# Patient Record
Sex: FEMALE | Race: WHITE | NOT HISPANIC OR LATINO | URBAN - METROPOLITAN AREA
[De-identification: names, ages, dates, MRNs, and addresses within clinical notes are randomized per-mention and may not be internally consistent; named-entity substitution may affect disease eponyms.]

---

## 2017-04-10 ENCOUNTER — EMERGENCY (EMERGENCY)
Facility: HOSPITAL | Age: 52
LOS: 0 days | Discharge: HOME | End: 2017-04-10
Admitting: INTERNAL MEDICINE

## 2017-06-27 DIAGNOSIS — I10 ESSENTIAL (PRIMARY) HYPERTENSION: ICD-10-CM

## 2017-06-27 DIAGNOSIS — K21.9 GASTRO-ESOPHAGEAL REFLUX DISEASE WITHOUT ESOPHAGITIS: ICD-10-CM

## 2017-06-27 DIAGNOSIS — R10.9 UNSPECIFIED ABDOMINAL PAIN: ICD-10-CM

## 2017-06-27 DIAGNOSIS — Z79.899 OTHER LONG TERM (CURRENT) DRUG THERAPY: ICD-10-CM

## 2017-06-27 DIAGNOSIS — N20.0 CALCULUS OF KIDNEY: ICD-10-CM

## 2017-06-27 DIAGNOSIS — Z79.82 LONG TERM (CURRENT) USE OF ASPIRIN: ICD-10-CM

## 2018-06-12 ENCOUNTER — OUTPATIENT (OUTPATIENT)
Dept: OUTPATIENT SERVICES | Facility: HOSPITAL | Age: 53
LOS: 1 days | Discharge: HOME | End: 2018-06-12

## 2018-06-14 DIAGNOSIS — K02.53 DENTAL CARIES ON PIT AND FISSURE SURFACE PENETRATING INTO PULP: ICD-10-CM

## 2018-06-29 ENCOUNTER — OUTPATIENT (OUTPATIENT)
Dept: OUTPATIENT SERVICES | Facility: HOSPITAL | Age: 53
LOS: 1 days | Discharge: HOME | End: 2018-06-29

## 2018-08-09 ENCOUNTER — OUTPATIENT (OUTPATIENT)
Dept: OUTPATIENT SERVICES | Facility: HOSPITAL | Age: 53
LOS: 1 days | Discharge: HOME | End: 2018-08-09

## 2019-09-04 ENCOUNTER — APPOINTMENT (OUTPATIENT)
Dept: OTOLARYNGOLOGY | Facility: CLINIC | Age: 54
End: 2019-09-04

## 2020-12-29 ENCOUNTER — TRANSCRIPTION ENCOUNTER (OUTPATIENT)
Age: 55
End: 2020-12-29

## 2021-12-16 ENCOUNTER — APPOINTMENT (OUTPATIENT)
Dept: OTOLARYNGOLOGY | Facility: CLINIC | Age: 56
End: 2021-12-16
Payer: MEDICARE

## 2021-12-16 DIAGNOSIS — R49.0 DYSPHONIA: ICD-10-CM

## 2021-12-16 DIAGNOSIS — K21.9 GASTRO-ESOPHAGEAL REFLUX DISEASE W/OUT ESOPHAGITIS: ICD-10-CM

## 2021-12-16 PROBLEM — Z00.00 ENCOUNTER FOR PREVENTIVE HEALTH EXAMINATION: Status: ACTIVE | Noted: 2021-12-16

## 2021-12-16 PROCEDURE — 99203 OFFICE O/P NEW LOW 30 MIN: CPT | Mod: 25

## 2021-12-16 PROCEDURE — 31575 DIAGNOSTIC LARYNGOSCOPY: CPT

## 2021-12-16 NOTE — HISTORY OF PRESENT ILLNESS
[de-identified] : Patient presents today with c/o hoarseness. Patient admits has been present for years. Gets worse at times. Patient admits some dysphagia but contributes it to GERD. Some SOB but she suffers from asthma. Patient taking Omeprazole and Famotidine. No hx of smoking. \par +coffee +tea +late eating

## 2021-12-16 NOTE — PROCEDURE
[Flexible Endoscope] : examined with the flexible endoscope [True Vocal Cords Erythematous] : bilateral true vocal cord edema [Glottis Arytenoid Cartilages Erythema] : bilateral arytenoid ~M erythema [Arytenoid Erythema ___ /4] : arytenoid erythema [unfilled]U/4 [Normal] : posterior cricoid area had healthy pink mucosa in the interarytenoid area and the esophageal inlet

## 2022-10-04 ENCOUNTER — APPOINTMENT (OUTPATIENT)
Dept: OTOLARYNGOLOGY | Facility: CLINIC | Age: 57
End: 2022-10-04

## 2023-09-27 ENCOUNTER — APPOINTMENT (OUTPATIENT)
Dept: SURGERY | Facility: CLINIC | Age: 58
End: 2023-09-27
Payer: MEDICARE

## 2023-09-27 VITALS
TEMPERATURE: 97.2 F | BODY MASS INDEX: 43.36 KG/M2 | HEART RATE: 121 BPM | DIASTOLIC BLOOD PRESSURE: 70 MMHG | OXYGEN SATURATION: 98 % | SYSTOLIC BLOOD PRESSURE: 124 MMHG | HEIGHT: 64 IN | WEIGHT: 254 LBS

## 2023-09-27 DIAGNOSIS — I10 ESSENTIAL (PRIMARY) HYPERTENSION: ICD-10-CM

## 2023-09-27 PROCEDURE — 99204 OFFICE O/P NEW MOD 45 MIN: CPT

## 2023-10-04 ENCOUNTER — OUTPATIENT (OUTPATIENT)
Dept: OUTPATIENT SERVICES | Facility: HOSPITAL | Age: 58
LOS: 1 days | End: 2023-10-04
Payer: MEDICARE

## 2023-10-04 ENCOUNTER — APPOINTMENT (OUTPATIENT)
Dept: NEUROPSYCHOLOGY | Facility: CLINIC | Age: 58
End: 2023-10-04

## 2023-10-04 DIAGNOSIS — F50.9 EATING DISORDER, UNSPECIFIED: ICD-10-CM

## 2023-10-04 PROCEDURE — 96146 PSYCL/NRPSYC TST AUTO RESULT: CPT | Mod: 95

## 2023-10-05 DIAGNOSIS — F50.9 EATING DISORDER, UNSPECIFIED: ICD-10-CM

## 2023-10-24 ENCOUNTER — RESULT REVIEW (OUTPATIENT)
Age: 58
End: 2023-10-24

## 2023-10-24 ENCOUNTER — OUTPATIENT (OUTPATIENT)
Dept: OUTPATIENT SERVICES | Facility: HOSPITAL | Age: 58
LOS: 1 days | End: 2023-10-24
Payer: MEDICARE

## 2023-10-24 DIAGNOSIS — Z00.8 ENCOUNTER FOR OTHER GENERAL EXAMINATION: ICD-10-CM

## 2023-10-24 DIAGNOSIS — K44.9 DIAPHRAGMATIC HERNIA WITHOUT OBSTRUCTION OR GANGRENE: ICD-10-CM

## 2023-10-24 PROCEDURE — 74240 X-RAY XM UPR GI TRC 1CNTRST: CPT | Mod: 26

## 2023-10-24 PROCEDURE — 74240 X-RAY XM UPR GI TRC 1CNTRST: CPT

## 2023-10-25 ENCOUNTER — NON-APPOINTMENT (OUTPATIENT)
Age: 58
End: 2023-10-25

## 2023-10-25 DIAGNOSIS — K44.9 DIAPHRAGMATIC HERNIA WITHOUT OBSTRUCTION OR GANGRENE: ICD-10-CM

## 2023-10-26 ENCOUNTER — APPOINTMENT (OUTPATIENT)
Dept: SURGERY | Facility: CLINIC | Age: 58
End: 2023-10-26
Payer: MEDICARE

## 2023-10-26 VITALS — HEIGHT: 64 IN | WEIGHT: 255 LBS | BODY MASS INDEX: 43.54 KG/M2

## 2023-10-26 PROCEDURE — 97802 MEDICAL NUTRITION INDIV IN: CPT

## 2023-11-09 ENCOUNTER — APPOINTMENT (OUTPATIENT)
Dept: SURGERY | Facility: CLINIC | Age: 58
End: 2023-11-09
Payer: MEDICARE

## 2023-11-09 VITALS — HEIGHT: 64 IN | BODY MASS INDEX: 43.36 KG/M2 | WEIGHT: 254 LBS

## 2023-11-09 PROCEDURE — 97803 MED NUTRITION INDIV SUBSEQ: CPT

## 2023-11-15 ENCOUNTER — APPOINTMENT (OUTPATIENT)
Dept: NEUROPSYCHOLOGY | Facility: HOSPITAL | Age: 58
End: 2023-11-15

## 2023-11-15 ENCOUNTER — OUTPATIENT (OUTPATIENT)
Dept: OUTPATIENT SERVICES | Facility: HOSPITAL | Age: 58
LOS: 1 days | End: 2023-11-15
Payer: MEDICARE

## 2023-11-15 DIAGNOSIS — F50.9 EATING DISORDER, UNSPECIFIED: ICD-10-CM

## 2023-11-15 PROCEDURE — 96138 PSYCL/NRPSYC TECH 1ST: CPT | Mod: 95

## 2023-11-15 PROCEDURE — 96139 PSYCL/NRPSYC TST TECH EA: CPT | Mod: 95

## 2023-11-15 PROCEDURE — 96130 PSYCL TST EVAL PHYS/QHP 1ST: CPT | Mod: 95

## 2023-11-15 PROCEDURE — 90791 PSYCH DIAGNOSTIC EVALUATION: CPT | Mod: 95

## 2023-11-16 DIAGNOSIS — F50.9 EATING DISORDER, UNSPECIFIED: ICD-10-CM

## 2023-11-27 ENCOUNTER — APPOINTMENT (OUTPATIENT)
Dept: SURGERY | Facility: CLINIC | Age: 58
End: 2023-11-27
Payer: MEDICARE

## 2023-11-27 PROCEDURE — 97803 MED NUTRITION INDIV SUBSEQ: CPT

## 2023-11-29 VITALS — HEIGHT: 64 IN | WEIGHT: 254 LBS | BODY MASS INDEX: 43.36 KG/M2

## 2023-12-11 ENCOUNTER — APPOINTMENT (OUTPATIENT)
Dept: SURGERY | Facility: CLINIC | Age: 58
End: 2023-12-11
Payer: MEDICARE

## 2023-12-11 PROCEDURE — 97803 MED NUTRITION INDIV SUBSEQ: CPT | Mod: 95

## 2023-12-13 VITALS — HEIGHT: 64 IN | WEIGHT: 251 LBS | BODY MASS INDEX: 42.85 KG/M2

## 2024-02-06 DIAGNOSIS — I10 ESSENTIAL (PRIMARY) HYPERTENSION: ICD-10-CM

## 2024-02-06 DIAGNOSIS — K22.70 BARRETT'S ESOPHAGUS W/OUT DYSPLASIA: ICD-10-CM

## 2024-02-06 DIAGNOSIS — F41.9 ANXIETY DISORDER, UNSPECIFIED: ICD-10-CM

## 2024-02-06 DIAGNOSIS — F32.A ANXIETY DISORDER, UNSPECIFIED: ICD-10-CM

## 2024-02-06 DIAGNOSIS — G43.919 MIGRAINE, UNSPECIFIED, INTRACTABLE, W/OUT STATUS MIGRAINOSUS: ICD-10-CM

## 2024-02-06 DIAGNOSIS — M15.9 POLYOSTEOARTHRITIS, UNSPECIFIED: ICD-10-CM

## 2024-02-06 RX ORDER — FLUTICASONE FUROATE AND VILANTEROL TRIFENATATE 200; 25 UG/1; UG/1
200-25 POWDER RESPIRATORY (INHALATION)
Refills: 0 | Status: ACTIVE | COMMUNITY

## 2024-02-06 RX ORDER — FLUTICASONE PROPIONATE 50 MCG
SPRAY, SUSPENSION NASAL
Refills: 0 | Status: ACTIVE | COMMUNITY

## 2024-02-06 RX ORDER — MULTIVITAMIN
TABLET ORAL
Refills: 0 | Status: ACTIVE | COMMUNITY

## 2024-04-04 ENCOUNTER — NON-APPOINTMENT (OUTPATIENT)
Age: 59
End: 2024-04-04

## 2024-04-09 RX ORDER — DULOXETINE HYDROCHLORIDE 60 MG/1
60 CAPSULE, DELAYED RELEASE ORAL TWICE DAILY
Refills: 0 | Status: ACTIVE | COMMUNITY

## 2024-04-09 RX ORDER — MONTELUKAST SODIUM 10 MG/1
10 TABLET, FILM COATED ORAL DAILY
Refills: 0 | Status: ACTIVE | COMMUNITY

## 2024-04-09 RX ORDER — PANTOPRAZOLE 20 MG/1
20 TABLET, DELAYED RELEASE ORAL DAILY
Refills: 0 | Status: ACTIVE | COMMUNITY

## 2024-04-09 RX ORDER — AMITRIPTYLINE HYDROCHLORIDE 10 MG/1
10 TABLET, FILM COATED ORAL DAILY
Refills: 0 | Status: ACTIVE | COMMUNITY

## 2024-04-09 RX ORDER — LOSARTAN POTASSIUM 50 MG/1
50 TABLET, FILM COATED ORAL DAILY
Refills: 0 | Status: ACTIVE | COMMUNITY

## 2024-04-09 RX ORDER — SPIRONOLACTONE 50 MG/1
50 TABLET ORAL DAILY
Refills: 0 | Status: ACTIVE | COMMUNITY

## 2024-04-09 RX ORDER — LORATADINE 10 MG/1
TABLET ORAL DAILY
Refills: 0 | Status: ACTIVE | COMMUNITY

## 2024-04-09 RX ORDER — OXYCODONE HYDROCHLORIDE 5 MG/1
CAPSULE ORAL 3 TIMES DAILY
Refills: 0 | Status: ACTIVE | COMMUNITY

## 2024-04-09 RX ORDER — BUPROPION HYDROCHLORIDE 75 MG/1
TABLET, FILM COATED ORAL DAILY
Refills: 0 | Status: ACTIVE | COMMUNITY

## 2024-04-09 RX ORDER — FAMOTIDINE 10 MG/1
TABLET, FILM COATED ORAL DAILY
Refills: 0 | Status: ACTIVE | COMMUNITY

## 2024-04-10 ENCOUNTER — APPOINTMENT (OUTPATIENT)
Dept: SURGERY | Facility: CLINIC | Age: 59
End: 2024-04-10
Payer: MEDICARE

## 2024-04-10 VITALS
SYSTOLIC BLOOD PRESSURE: 126 MMHG | BODY MASS INDEX: 43.87 KG/M2 | HEIGHT: 64 IN | DIASTOLIC BLOOD PRESSURE: 80 MMHG | WEIGHT: 257 LBS

## 2024-04-10 PROCEDURE — G2211 COMPLEX E/M VISIT ADD ON: CPT

## 2024-04-10 PROCEDURE — 99215 OFFICE O/P EST HI 40 MIN: CPT

## 2024-04-13 NOTE — ASSESSMENT
[FreeTextEntry1] : 57 yo female with class 3 obesity, barretts, and a large hiatal hernia planning for a HHR and gastric bypass

## 2024-04-13 NOTE — HISTORY OF PRESENT ILLNESS
[de-identified] : 57 yo female with class 3 obesity and a large hiatal hernia and barretts esophagus. She is planning to undergo a hiatal hernia repair and gastric bypass. She is here for her pre-op visit after completing all her clearances.

## 2024-04-13 NOTE — PHYSICAL EXAM
[Normal] : affect appropriate [de-identified] : nonlabored breathing  [de-identified] : s1,s2 [de-identified] : soft, nontender

## 2024-04-13 NOTE — PLAN
[FreeTextEntry1] : At this preoperative visit, we discussed the risks and benefits of weight loss surgery. We specifically discussed the risks of anesthesia including cardiac and pulmonary complications, DVT/PE, pneumonia, leaks, infection, death, bleeding, ulcers, and need for additional operations have been reviewed. We discussed the importance of a consistent diet and exercise regimen in regards to weight loss and maintenance as well. The importance of lifelong mineral and vitamin supplementation was also reviewed with the patient. The patient was given ample opportunity to ask questions and all questions were answered.  They also saw EMRE Perez to discuss the pre- and post-operative instructions and diet in extensive detail. Please see their notes for further detail.

## 2024-04-15 ENCOUNTER — OUTPATIENT (OUTPATIENT)
Dept: OUTPATIENT SERVICES | Facility: HOSPITAL | Age: 59
LOS: 1 days | End: 2024-04-15
Payer: MEDICARE

## 2024-04-15 VITALS
HEART RATE: 96 BPM | HEIGHT: 64 IN | DIASTOLIC BLOOD PRESSURE: 63 MMHG | WEIGHT: 259.04 LBS | RESPIRATION RATE: 20 BRPM | SYSTOLIC BLOOD PRESSURE: 124 MMHG | OXYGEN SATURATION: 100 % | TEMPERATURE: 97 F

## 2024-04-15 DIAGNOSIS — Z01.818 ENCOUNTER FOR OTHER PREPROCEDURAL EXAMINATION: ICD-10-CM

## 2024-04-15 DIAGNOSIS — Z98.890 OTHER SPECIFIED POSTPROCEDURAL STATES: Chronic | ICD-10-CM

## 2024-04-15 DIAGNOSIS — E66.01 MORBID (SEVERE) OBESITY DUE TO EXCESS CALORIES: ICD-10-CM

## 2024-04-15 DIAGNOSIS — Z90.49 ACQUIRED ABSENCE OF OTHER SPECIFIED PARTS OF DIGESTIVE TRACT: Chronic | ICD-10-CM

## 2024-04-15 DIAGNOSIS — N20.0 CALCULUS OF KIDNEY: Chronic | ICD-10-CM

## 2024-04-15 LAB
A1C WITH ESTIMATED AVERAGE GLUCOSE RESULT: 5.9 % — HIGH (ref 4–5.6)
ALBUMIN SERPL ELPH-MCNC: 4.4 G/DL — SIGNIFICANT CHANGE UP (ref 3.5–5.2)
ALP SERPL-CCNC: 100 U/L — SIGNIFICANT CHANGE UP (ref 30–115)
ALT FLD-CCNC: 16 U/L — SIGNIFICANT CHANGE UP (ref 0–41)
ANION GAP SERPL CALC-SCNC: 14 MMOL/L — SIGNIFICANT CHANGE UP (ref 7–14)
APTT BLD: 33.8 SEC — SIGNIFICANT CHANGE UP (ref 27–39.2)
AST SERPL-CCNC: 13 U/L — SIGNIFICANT CHANGE UP (ref 0–41)
BASOPHILS # BLD AUTO: 0.03 K/UL — SIGNIFICANT CHANGE UP (ref 0–0.2)
BASOPHILS NFR BLD AUTO: 0.3 % — SIGNIFICANT CHANGE UP (ref 0–1)
BILIRUB SERPL-MCNC: 0.2 MG/DL — SIGNIFICANT CHANGE UP (ref 0.2–1.2)
BLD GP AB SCN SERPL QL: SIGNIFICANT CHANGE UP
BUN SERPL-MCNC: 14 MG/DL — SIGNIFICANT CHANGE UP (ref 10–20)
CALCIUM SERPL-MCNC: 9.6 MG/DL — SIGNIFICANT CHANGE UP (ref 8.4–10.5)
CHLORIDE SERPL-SCNC: 103 MMOL/L — SIGNIFICANT CHANGE UP (ref 98–110)
CO2 SERPL-SCNC: 26 MMOL/L — SIGNIFICANT CHANGE UP (ref 17–32)
CREAT SERPL-MCNC: 0.7 MG/DL — SIGNIFICANT CHANGE UP (ref 0.7–1.5)
EGFR: 100 ML/MIN/1.73M2 — SIGNIFICANT CHANGE UP
EOSINOPHIL # BLD AUTO: 0.25 K/UL — SIGNIFICANT CHANGE UP (ref 0–0.7)
EOSINOPHIL NFR BLD AUTO: 2.2 % — SIGNIFICANT CHANGE UP (ref 0–8)
ESTIMATED AVERAGE GLUCOSE: 123 MG/DL — HIGH (ref 68–114)
GLUCOSE SERPL-MCNC: 90 MG/DL — SIGNIFICANT CHANGE UP (ref 70–99)
HCT VFR BLD CALC: 43.2 % — SIGNIFICANT CHANGE UP (ref 37–47)
HGB BLD-MCNC: 13.3 G/DL — SIGNIFICANT CHANGE UP (ref 12–16)
IMM GRANULOCYTES NFR BLD AUTO: 0.5 % — HIGH (ref 0.1–0.3)
INR BLD: 1 RATIO — SIGNIFICANT CHANGE UP (ref 0.65–1.3)
LYMPHOCYTES # BLD AUTO: 1.93 K/UL — SIGNIFICANT CHANGE UP (ref 1.2–3.4)
LYMPHOCYTES # BLD AUTO: 16.9 % — LOW (ref 20.5–51.1)
MCHC RBC-ENTMCNC: 26.9 PG — LOW (ref 27–31)
MCHC RBC-ENTMCNC: 30.8 G/DL — LOW (ref 32–37)
MCV RBC AUTO: 87.4 FL — SIGNIFICANT CHANGE UP (ref 81–99)
MONOCYTES # BLD AUTO: 0.51 K/UL — SIGNIFICANT CHANGE UP (ref 0.1–0.6)
MONOCYTES NFR BLD AUTO: 4.5 % — SIGNIFICANT CHANGE UP (ref 1.7–9.3)
NEUTROPHILS # BLD AUTO: 8.62 K/UL — HIGH (ref 1.4–6.5)
NEUTROPHILS NFR BLD AUTO: 75.6 % — HIGH (ref 42.2–75.2)
NRBC # BLD: 0 /100 WBCS — SIGNIFICANT CHANGE UP (ref 0–0)
PLATELET # BLD AUTO: 319 K/UL — SIGNIFICANT CHANGE UP (ref 130–400)
PMV BLD: 10.5 FL — HIGH (ref 7.4–10.4)
POTASSIUM SERPL-MCNC: 4.8 MMOL/L — SIGNIFICANT CHANGE UP (ref 3.5–5)
POTASSIUM SERPL-SCNC: 4.8 MMOL/L — SIGNIFICANT CHANGE UP (ref 3.5–5)
PROT SERPL-MCNC: 7.5 G/DL — SIGNIFICANT CHANGE UP (ref 6–8)
PROTHROM AB SERPL-ACNC: 11.4 SEC — SIGNIFICANT CHANGE UP (ref 9.95–12.87)
RBC # BLD: 4.94 M/UL — SIGNIFICANT CHANGE UP (ref 4.2–5.4)
RBC # FLD: 14.8 % — HIGH (ref 11.5–14.5)
SODIUM SERPL-SCNC: 143 MMOL/L — SIGNIFICANT CHANGE UP (ref 135–146)
WBC # BLD: 11.4 K/UL — HIGH (ref 4.8–10.8)
WBC # FLD AUTO: 11.4 K/UL — HIGH (ref 4.8–10.8)

## 2024-04-15 PROCEDURE — 99214 OFFICE O/P EST MOD 30 MIN: CPT | Mod: 25

## 2024-04-15 PROCEDURE — 86901 BLOOD TYPING SEROLOGIC RH(D): CPT

## 2024-04-15 PROCEDURE — 80053 COMPREHEN METABOLIC PANEL: CPT

## 2024-04-15 PROCEDURE — 86850 RBC ANTIBODY SCREEN: CPT

## 2024-04-15 PROCEDURE — 86900 BLOOD TYPING SEROLOGIC ABO: CPT

## 2024-04-15 PROCEDURE — 93010 ELECTROCARDIOGRAM REPORT: CPT

## 2024-04-15 PROCEDURE — 93005 ELECTROCARDIOGRAM TRACING: CPT

## 2024-04-15 PROCEDURE — 83036 HEMOGLOBIN GLYCOSYLATED A1C: CPT

## 2024-04-15 PROCEDURE — 85025 COMPLETE CBC W/AUTO DIFF WBC: CPT

## 2024-04-15 PROCEDURE — 85730 THROMBOPLASTIN TIME PARTIAL: CPT

## 2024-04-15 PROCEDURE — 36415 COLL VENOUS BLD VENIPUNCTURE: CPT

## 2024-04-15 PROCEDURE — 85610 PROTHROMBIN TIME: CPT

## 2024-04-15 NOTE — H&P PST ADULT - NSICDXPASTMEDICALHX_GEN_ALL_CORE_FT
PAST MEDICAL HISTORY:  Asthma     Back pain     Depression     GERD (gastroesophageal reflux disease)     HTN (hypertension)     Migraines     Neck pain     RAFA (obstructive sleep apnea)     Pseudogout     Renal stones

## 2024-04-15 NOTE — H&P PST ADULT - HISTORY OF PRESENT ILLNESS
59 y/o female  57 y/o female, h/o severe obesity; elected for above procedure. Denied chest pain, palp, cough, recent URI/UTI, +WEBB > 2 blks / > 1FOS.     Anesthesia Alert  NO--Difficult Airway  NO--History of neck surgery or radiation  NO--Limited ROM of neck  NO--History of Malignant hyperthermia  NO--Personal or family history of Pseudocholinesterase deficiency  NO--Prior Anesthesia Complication  NO--Latex Allergy  NO--Loose teeth  NO--History of Rheumatoid Arthritis  YES--RAFA -- Denied CPAP/BiPAP use   NO--Bleeding Risk  NO--Other_____    DASI  34.7 points  The higher the score (maximum 58.2), the higher the functional status.  7.01 METs      RCRI  0 points  Class I Risk  3.9 %      Opioid Risk Assessment Tool (Female)       Family history of substance abuse            Alcohol (1)  NO            Illegal Drugs (2)  NO            Prescription drugs (4)  NO       Personal history of substance abuse            Alcohol (3) NO            Illegal Drugs (4) NO            Prescription drugs (5)  YES       Age between 16-45 (1) NO         History of preadolescent sexual abuse (3)  NO       Psychological disease (ADD, ADHD, OCD, Bipolar Disorder, Schizophrenia, Depression) (2) 2     Scoring Totals:  Low Risk (0-3)  Moderate Risk (4-7)  High Risk (>/=8)

## 2024-04-15 NOTE — H&P PST ADULT - REASON FOR ADMISSION
Robotic Gastric Bypass, Possible Hiatal Hernia Repair, Possible Intra Operative Endoscopy, Possible Open, and all indicated Procedures

## 2024-04-15 NOTE — H&P PST ADULT - NSICDXPASTSURGICALHX_GEN_ALL_CORE_FT
PAST SURGICAL HISTORY:  H/O bladder repair surgery     History of cholecystectomy     Renal stones

## 2024-04-15 NOTE — H&P PST ADULT - NSCAFFEAMTFREQ_GEN_ALL_CORE_SD
PMD: Dr. Stein and Cardio: Dr. Jignesh Arce PMD: Dr. Jim Stein and Cardio: Dr. Jignesh Arce 1-2 cups/cans per day

## 2024-04-15 NOTE — H&P PST ADULT - NSICDXFAMILYHX_GEN_ALL_CORE_FT
FAMILY HISTORY:  Father  Still living? Unknown  FH: HTN (hypertension), Age at diagnosis: Age Unknown    Sibling  Still living? Unknown  Family history of blood clots, Age at diagnosis: Age Unknown

## 2024-04-16 DIAGNOSIS — E66.01 MORBID (SEVERE) OBESITY DUE TO EXCESS CALORIES: ICD-10-CM

## 2024-04-16 DIAGNOSIS — Z01.818 ENCOUNTER FOR OTHER PREPROCEDURAL EXAMINATION: ICD-10-CM

## 2024-04-22 PROBLEM — G47.33 OBSTRUCTIVE SLEEP APNEA (ADULT) (PEDIATRIC): Chronic | Status: ACTIVE | Noted: 2024-04-15

## 2024-04-22 PROBLEM — N20.0 CALCULUS OF KIDNEY: Chronic | Status: ACTIVE | Noted: 2024-04-15

## 2024-04-22 PROBLEM — M54.2 CERVICALGIA: Chronic | Status: ACTIVE | Noted: 2024-04-15

## 2024-04-22 PROBLEM — M54.9 DORSALGIA, UNSPECIFIED: Chronic | Status: ACTIVE | Noted: 2024-04-15

## 2024-04-22 PROBLEM — I10 ESSENTIAL (PRIMARY) HYPERTENSION: Chronic | Status: ACTIVE | Noted: 2024-04-15

## 2024-04-22 PROBLEM — M11.20 OTHER CHONDROCALCINOSIS, UNSPECIFIED SITE: Chronic | Status: ACTIVE | Noted: 2024-04-15

## 2024-04-22 PROBLEM — K21.9 GASTRO-ESOPHAGEAL REFLUX DISEASE WITHOUT ESOPHAGITIS: Chronic | Status: ACTIVE | Noted: 2024-04-15

## 2024-04-22 PROBLEM — F32.A DEPRESSION, UNSPECIFIED: Chronic | Status: ACTIVE | Noted: 2024-04-15

## 2024-04-22 PROBLEM — G43.909 MIGRAINE, UNSPECIFIED, NOT INTRACTABLE, WITHOUT STATUS MIGRAINOSUS: Chronic | Status: ACTIVE | Noted: 2024-04-15

## 2024-04-22 PROBLEM — E66.01 MORBID (SEVERE) OBESITY DUE TO EXCESS CALORIES: Chronic | Status: ACTIVE | Noted: 2024-04-15

## 2024-04-22 PROBLEM — J45.909 UNSPECIFIED ASTHMA, UNCOMPLICATED: Chronic | Status: ACTIVE | Noted: 2024-04-15

## 2024-04-26 NOTE — ASU PATIENT PROFILE, ADULT - NSICDXPASTMEDICALHX_GEN_ALL_CORE_FT
PAST MEDICAL HISTORY:  Asthma     Back pain     Depression     GERD (gastroesophageal reflux disease)     HTN (hypertension)     Migraines     Neck pain     RAFA (obstructive sleep apnea)     Pseudogout     Renal stones     Severe obesity (BMI >= 40)

## 2024-04-26 NOTE — ASU PATIENT PROFILE, ADULT - NSICDXPASTSURGICALHX_GEN_ALL_CORE_FT
PAST SURGICAL HISTORY:  H/O bladder repair surgery     H/O removal of cyst     History of cholecystectomy     Renal stones

## 2024-04-29 ENCOUNTER — APPOINTMENT (OUTPATIENT)
Dept: SURGERY | Facility: HOSPITAL | Age: 59
End: 2024-04-29

## 2024-04-29 ENCOUNTER — INPATIENT (INPATIENT)
Facility: HOSPITAL | Age: 59
LOS: 15 days | Discharge: HOME HEALTH | DRG: 619 | End: 2024-05-15
Attending: STUDENT IN AN ORGANIZED HEALTH CARE EDUCATION/TRAINING PROGRAM | Admitting: STUDENT IN AN ORGANIZED HEALTH CARE EDUCATION/TRAINING PROGRAM
Payer: MEDICARE

## 2024-04-29 VITALS
HEART RATE: 109 BPM | TEMPERATURE: 98 F | WEIGHT: 246.92 LBS | SYSTOLIC BLOOD PRESSURE: 133 MMHG | OXYGEN SATURATION: 94 % | DIASTOLIC BLOOD PRESSURE: 94 MMHG | HEIGHT: 64 IN | RESPIRATION RATE: 18 BRPM

## 2024-04-29 DIAGNOSIS — Z98.890 OTHER SPECIFIED POSTPROCEDURAL STATES: Chronic | ICD-10-CM

## 2024-04-29 DIAGNOSIS — Z90.49 ACQUIRED ABSENCE OF OTHER SPECIFIED PARTS OF DIGESTIVE TRACT: Chronic | ICD-10-CM

## 2024-04-29 DIAGNOSIS — N20.0 CALCULUS OF KIDNEY: Chronic | ICD-10-CM

## 2024-04-29 DIAGNOSIS — E66.01 MORBID (SEVERE) OBESITY DUE TO EXCESS CALORIES: ICD-10-CM

## 2024-04-29 LAB
ANION GAP SERPL CALC-SCNC: 13 MMOL/L — SIGNIFICANT CHANGE UP (ref 7–14)
BASOPHILS # BLD AUTO: 0.01 K/UL — SIGNIFICANT CHANGE UP (ref 0–0.2)
BASOPHILS NFR BLD AUTO: 0.1 % — SIGNIFICANT CHANGE UP (ref 0–1)
BUN SERPL-MCNC: 15 MG/DL — SIGNIFICANT CHANGE UP (ref 10–20)
CALCIUM SERPL-MCNC: 9.3 MG/DL — SIGNIFICANT CHANGE UP (ref 8.4–10.5)
CHLORIDE SERPL-SCNC: 98 MMOL/L — SIGNIFICANT CHANGE UP (ref 98–110)
CO2 SERPL-SCNC: 27 MMOL/L — SIGNIFICANT CHANGE UP (ref 17–32)
CREAT SERPL-MCNC: 0.8 MG/DL — SIGNIFICANT CHANGE UP (ref 0.7–1.5)
EGFR: 85 ML/MIN/1.73M2 — SIGNIFICANT CHANGE UP
EOSINOPHIL # BLD AUTO: 0 K/UL — SIGNIFICANT CHANGE UP (ref 0–0.7)
EOSINOPHIL NFR BLD AUTO: 0 % — SIGNIFICANT CHANGE UP (ref 0–8)
GLUCOSE BLDC GLUCOMTR-MCNC: 114 MG/DL — HIGH (ref 70–99)
GLUCOSE BLDC GLUCOMTR-MCNC: 157 MG/DL — HIGH (ref 70–99)
GLUCOSE BLDC GLUCOMTR-MCNC: 164 MG/DL — HIGH (ref 70–99)
GLUCOSE SERPL-MCNC: 152 MG/DL — HIGH (ref 70–99)
HCT VFR BLD CALC: 41.3 % — SIGNIFICANT CHANGE UP (ref 37–47)
HGB BLD-MCNC: 12.7 G/DL — SIGNIFICANT CHANGE UP (ref 12–16)
IMM GRANULOCYTES NFR BLD AUTO: 0.5 % — HIGH (ref 0.1–0.3)
LYMPHOCYTES # BLD AUTO: 0.94 K/UL — LOW (ref 1.2–3.4)
LYMPHOCYTES # BLD AUTO: 6.4 % — LOW (ref 20.5–51.1)
MAGNESIUM SERPL-MCNC: 1.7 MG/DL — LOW (ref 1.8–2.4)
MCHC RBC-ENTMCNC: 26.7 PG — LOW (ref 27–31)
MCHC RBC-ENTMCNC: 30.8 G/DL — LOW (ref 32–37)
MCV RBC AUTO: 86.8 FL — SIGNIFICANT CHANGE UP (ref 81–99)
MONOCYTES # BLD AUTO: 0.45 K/UL — SIGNIFICANT CHANGE UP (ref 0.1–0.6)
MONOCYTES NFR BLD AUTO: 3.1 % — SIGNIFICANT CHANGE UP (ref 1.7–9.3)
NEUTROPHILS # BLD AUTO: 13.14 K/UL — HIGH (ref 1.4–6.5)
NEUTROPHILS NFR BLD AUTO: 89.9 % — HIGH (ref 42.2–75.2)
NRBC # BLD: 0 /100 WBCS — SIGNIFICANT CHANGE UP (ref 0–0)
PHOSPHATE SERPL-MCNC: 3.4 MG/DL — SIGNIFICANT CHANGE UP (ref 2.1–4.9)
PLATELET # BLD AUTO: 275 K/UL — SIGNIFICANT CHANGE UP (ref 130–400)
PMV BLD: 10.8 FL — HIGH (ref 7.4–10.4)
POTASSIUM SERPL-MCNC: 5.1 MMOL/L — HIGH (ref 3.5–5)
POTASSIUM SERPL-SCNC: 5.1 MMOL/L — HIGH (ref 3.5–5)
RBC # BLD: 4.76 M/UL — SIGNIFICANT CHANGE UP (ref 4.2–5.4)
RBC # FLD: 14.8 % — HIGH (ref 11.5–14.5)
SODIUM SERPL-SCNC: 138 MMOL/L — SIGNIFICANT CHANGE UP (ref 135–146)
WBC # BLD: 14.61 K/UL — HIGH (ref 4.8–10.8)
WBC # FLD AUTO: 14.61 K/UL — HIGH (ref 4.8–10.8)

## 2024-04-29 PROCEDURE — 84300 ASSAY OF URINE SODIUM: CPT

## 2024-04-29 PROCEDURE — 93005 ELECTROCARDIOGRAM TRACING: CPT

## 2024-04-29 PROCEDURE — P9047: CPT

## 2024-04-29 PROCEDURE — 86900 BLOOD TYPING SEROLOGIC ABO: CPT

## 2024-04-29 PROCEDURE — 82550 ASSAY OF CK (CPK): CPT

## 2024-04-29 PROCEDURE — C1889: CPT

## 2024-04-29 PROCEDURE — 85025 COMPLETE CBC W/AUTO DIFF WBC: CPT

## 2024-04-29 PROCEDURE — 94760 N-INVAS EAR/PLS OXIMETRY 1: CPT

## 2024-04-29 PROCEDURE — 36415 COLL VENOUS BLD VENIPUNCTURE: CPT

## 2024-04-29 PROCEDURE — 82553 CREATINE MB FRACTION: CPT

## 2024-04-29 PROCEDURE — 94010 BREATHING CAPACITY TEST: CPT

## 2024-04-29 PROCEDURE — 87641 MR-STAPH DNA AMP PROBE: CPT

## 2024-04-29 PROCEDURE — 85027 COMPLETE CBC AUTOMATED: CPT

## 2024-04-29 PROCEDURE — 84100 ASSAY OF PHOSPHORUS: CPT

## 2024-04-29 PROCEDURE — 87640 STAPH A DNA AMP PROBE: CPT

## 2024-04-29 PROCEDURE — 85610 PROTHROMBIN TIME: CPT

## 2024-04-29 PROCEDURE — 86901 BLOOD TYPING SEROLOGIC RH(D): CPT

## 2024-04-29 PROCEDURE — S2900 ROBOTIC SURGICAL SYSTEM: CPT | Mod: NC

## 2024-04-29 PROCEDURE — S2900: CPT

## 2024-04-29 PROCEDURE — 74177 CT ABD & PELVIS W/CONTRAST: CPT | Mod: MC

## 2024-04-29 PROCEDURE — 80048 BASIC METABOLIC PNL TOTAL CA: CPT

## 2024-04-29 PROCEDURE — 93970 EXTREMITY STUDY: CPT

## 2024-04-29 PROCEDURE — 83605 ASSAY OF LACTIC ACID: CPT

## 2024-04-29 PROCEDURE — 97163 PT EVAL HIGH COMPLEX 45 MIN: CPT | Mod: GP

## 2024-04-29 PROCEDURE — 94003 VENT MGMT INPAT SUBQ DAY: CPT

## 2024-04-29 PROCEDURE — 74018 RADEX ABDOMEN 1 VIEW: CPT

## 2024-04-29 PROCEDURE — 84132 ASSAY OF SERUM POTASSIUM: CPT

## 2024-04-29 PROCEDURE — 82330 ASSAY OF CALCIUM: CPT

## 2024-04-29 PROCEDURE — 94640 AIRWAY INHALATION TREATMENT: CPT

## 2024-04-29 PROCEDURE — 71045 X-RAY EXAM CHEST 1 VIEW: CPT

## 2024-04-29 PROCEDURE — 84478 ASSAY OF TRIGLYCERIDES: CPT

## 2024-04-29 PROCEDURE — 82570 ASSAY OF URINE CREATININE: CPT

## 2024-04-29 PROCEDURE — 87070 CULTURE OTHR SPECIMN AEROBIC: CPT

## 2024-04-29 PROCEDURE — 43644 LAP GASTRIC BYPASS/ROUX-EN-Y: CPT

## 2024-04-29 PROCEDURE — 94660 CPAP INITIATION&MGMT: CPT

## 2024-04-29 PROCEDURE — 83735 ASSAY OF MAGNESIUM: CPT

## 2024-04-29 PROCEDURE — 93306 TTE W/DOPPLER COMPLETE: CPT

## 2024-04-29 PROCEDURE — 87077 CULTURE AEROBIC IDENTIFY: CPT

## 2024-04-29 PROCEDURE — 85014 HEMATOCRIT: CPT

## 2024-04-29 PROCEDURE — C9113: CPT

## 2024-04-29 PROCEDURE — 84484 ASSAY OF TROPONIN QUANT: CPT

## 2024-04-29 PROCEDURE — 80076 HEPATIC FUNCTION PANEL: CPT

## 2024-04-29 PROCEDURE — 85018 HEMOGLOBIN: CPT

## 2024-04-29 PROCEDURE — 82803 BLOOD GASES ANY COMBINATION: CPT

## 2024-04-29 PROCEDURE — 82962 GLUCOSE BLOOD TEST: CPT

## 2024-04-29 PROCEDURE — 87186 SC STD MICRODIL/AGAR DIL: CPT

## 2024-04-29 PROCEDURE — 84145 PROCALCITONIN (PCT): CPT

## 2024-04-29 PROCEDURE — 86850 RBC ANTIBODY SCREEN: CPT

## 2024-04-29 PROCEDURE — 43644 LAP GASTRIC BYPASS/ROUX-EN-Y: CPT | Mod: AS

## 2024-04-29 PROCEDURE — 94002 VENT MGMT INPAT INIT DAY: CPT

## 2024-04-29 PROCEDURE — 85730 THROMBOPLASTIN TIME PARTIAL: CPT

## 2024-04-29 PROCEDURE — 84295 ASSAY OF SERUM SODIUM: CPT

## 2024-04-29 RX ORDER — ONDANSETRON 8 MG/1
4 TABLET, FILM COATED ORAL EVERY 6 HOURS
Refills: 0 | Status: DISCONTINUED | OUTPATIENT
Start: 2024-04-29 | End: 2024-04-30

## 2024-04-29 RX ORDER — OXYCODONE AND ACETAMINOPHEN 5; 325 MG/1; MG/1
1 TABLET ORAL
Refills: 0 | DISCHARGE

## 2024-04-29 RX ORDER — OXYCODONE HYDROCHLORIDE 5 MG/1
7.5 TABLET ORAL EVERY 6 HOURS
Refills: 0 | Status: DISCONTINUED | OUTPATIENT
Start: 2024-04-29 | End: 2024-04-29

## 2024-04-29 RX ORDER — DULOXETINE HYDROCHLORIDE 30 MG/1
1 CAPSULE, DELAYED RELEASE ORAL
Refills: 0 | DISCHARGE

## 2024-04-29 RX ORDER — PANTOPRAZOLE SODIUM 20 MG/1
40 TABLET, DELAYED RELEASE ORAL EVERY 24 HOURS
Refills: 0 | Status: DISCONTINUED | OUTPATIENT
Start: 2024-04-29 | End: 2024-04-30

## 2024-04-29 RX ORDER — ENOXAPARIN SODIUM 100 MG/ML
40 INJECTION SUBCUTANEOUS ONCE
Refills: 0 | Status: COMPLETED | OUTPATIENT
Start: 2024-04-29 | End: 2024-04-29

## 2024-04-29 RX ORDER — AMITRIPTYLINE HCL 25 MG
10 TABLET ORAL AT BEDTIME
Refills: 0 | Status: DISCONTINUED | OUTPATIENT
Start: 2024-04-29 | End: 2024-04-30

## 2024-04-29 RX ORDER — DULOXETINE HYDROCHLORIDE 30 MG/1
60 CAPSULE, DELAYED RELEASE ORAL DAILY
Refills: 0 | Status: DISCONTINUED | OUTPATIENT
Start: 2024-04-29 | End: 2024-04-30

## 2024-04-29 RX ORDER — ACETAMINOPHEN 500 MG
1000 TABLET ORAL ONCE
Refills: 0 | Status: DISCONTINUED | OUTPATIENT
Start: 2024-04-29 | End: 2024-04-29

## 2024-04-29 RX ORDER — AMITRIPTYLINE HCL 25 MG
1 TABLET ORAL
Refills: 0 | DISCHARGE

## 2024-04-29 RX ORDER — OXYCODONE HYDROCHLORIDE 5 MG/1
7.5 TABLET ORAL EVERY 8 HOURS
Refills: 0 | Status: DISCONTINUED | OUTPATIENT
Start: 2024-04-29 | End: 2024-04-29

## 2024-04-29 RX ORDER — HYDROMORPHONE HYDROCHLORIDE 2 MG/ML
0.5 INJECTION INTRAMUSCULAR; INTRAVENOUS; SUBCUTANEOUS
Refills: 0 | Status: DISCONTINUED | OUTPATIENT
Start: 2024-04-29 | End: 2024-04-29

## 2024-04-29 RX ORDER — SODIUM CHLORIDE 9 MG/ML
1000 INJECTION, SOLUTION INTRAVENOUS
Refills: 0 | Status: DISCONTINUED | OUTPATIENT
Start: 2024-04-29 | End: 2024-04-30

## 2024-04-29 RX ORDER — OXYCODONE HYDROCHLORIDE 5 MG/1
10 TABLET ORAL EVERY 6 HOURS
Refills: 0 | Status: DISCONTINUED | OUTPATIENT
Start: 2024-04-29 | End: 2024-04-30

## 2024-04-29 RX ORDER — LOSARTAN POTASSIUM 100 MG/1
50 TABLET, FILM COATED ORAL DAILY
Refills: 0 | Status: DISCONTINUED | OUTPATIENT
Start: 2024-04-29 | End: 2024-04-30

## 2024-04-29 RX ORDER — ALBUTEROL 90 UG/1
2 AEROSOL, METERED ORAL EVERY 6 HOURS
Refills: 0 | Status: DISCONTINUED | OUTPATIENT
Start: 2024-04-29 | End: 2024-04-30

## 2024-04-29 RX ORDER — GABAPENTIN 400 MG/1
400 CAPSULE ORAL THREE TIMES A DAY
Refills: 0 | Status: DISCONTINUED | OUTPATIENT
Start: 2024-04-29 | End: 2024-04-30

## 2024-04-29 RX ORDER — HYOSCYAMINE SULFATE 0.13 MG
0.12 TABLET ORAL EVERY 4 HOURS
Refills: 0 | Status: DISCONTINUED | OUTPATIENT
Start: 2024-04-29 | End: 2024-04-30

## 2024-04-29 RX ORDER — FAMOTIDINE 10 MG/ML
1 INJECTION INTRAVENOUS
Refills: 0 | DISCHARGE

## 2024-04-29 RX ORDER — ENOXAPARIN SODIUM 100 MG/ML
40 INJECTION SUBCUTANEOUS EVERY 24 HOURS
Refills: 0 | Status: DISCONTINUED | OUTPATIENT
Start: 2024-04-29 | End: 2024-04-30

## 2024-04-29 RX ORDER — APREPITANT 80 MG/1
40 CAPSULE ORAL ONCE
Refills: 0 | Status: COMPLETED | OUTPATIENT
Start: 2024-04-29 | End: 2024-04-29

## 2024-04-29 RX ORDER — MONTELUKAST 4 MG/1
1 TABLET, CHEWABLE ORAL
Refills: 0 | DISCHARGE

## 2024-04-29 RX ORDER — LORATADINE 10 MG/1
1 TABLET ORAL
Refills: 0 | DISCHARGE

## 2024-04-29 RX ORDER — OXYCODONE HYDROCHLORIDE 5 MG/1
5 TABLET ORAL EVERY 6 HOURS
Refills: 0 | Status: DISCONTINUED | OUTPATIENT
Start: 2024-04-29 | End: 2024-04-29

## 2024-04-29 RX ORDER — ACETAMINOPHEN 500 MG
1000 TABLET ORAL ONCE
Refills: 0 | Status: COMPLETED | OUTPATIENT
Start: 2024-04-29 | End: 2024-04-29

## 2024-04-29 RX ORDER — HYDROMORPHONE HYDROCHLORIDE 2 MG/ML
1 INJECTION INTRAMUSCULAR; INTRAVENOUS; SUBCUTANEOUS
Refills: 0 | Status: DISCONTINUED | OUTPATIENT
Start: 2024-04-29 | End: 2024-04-29

## 2024-04-29 RX ORDER — UBROGEPANT 100 MG/1
1 TABLET ORAL
Refills: 0 | DISCHARGE

## 2024-04-29 RX ORDER — BUPROPION HYDROCHLORIDE 150 MG/1
1 TABLET, EXTENDED RELEASE ORAL
Refills: 0 | DISCHARGE

## 2024-04-29 RX ORDER — MAGNESIUM SULFATE 500 MG/ML
2 VIAL (ML) INJECTION ONCE
Refills: 0 | Status: COMPLETED | OUTPATIENT
Start: 2024-04-29 | End: 2024-04-29

## 2024-04-29 RX ORDER — SPIRONOLACTONE 25 MG/1
50 TABLET, FILM COATED ORAL DAILY
Refills: 0 | Status: DISCONTINUED | OUTPATIENT
Start: 2024-04-29 | End: 2024-04-30

## 2024-04-29 RX ORDER — ACETAMINOPHEN 500 MG
650 TABLET ORAL EVERY 6 HOURS
Refills: 0 | Status: DISCONTINUED | OUTPATIENT
Start: 2024-04-30 | End: 2024-04-30

## 2024-04-29 RX ORDER — BUPROPION HYDROCHLORIDE 150 MG/1
150 TABLET, EXTENDED RELEASE ORAL DAILY
Refills: 0 | Status: DISCONTINUED | OUTPATIENT
Start: 2024-04-29 | End: 2024-04-30

## 2024-04-29 RX ORDER — ONDANSETRON 8 MG/1
4 TABLET, FILM COATED ORAL EVERY 6 HOURS
Refills: 0 | Status: DISCONTINUED | OUTPATIENT
Start: 2024-04-29 | End: 2024-04-29

## 2024-04-29 RX ADMIN — Medication 400 MILLIGRAM(S): at 17:03

## 2024-04-29 RX ADMIN — OXYCODONE HYDROCHLORIDE 10 MILLIGRAM(S): 5 TABLET ORAL at 22:00

## 2024-04-29 RX ADMIN — GABAPENTIN 400 MILLIGRAM(S): 400 CAPSULE ORAL at 21:10

## 2024-04-29 RX ADMIN — Medication 650 MILLIGRAM(S): at 23:30

## 2024-04-29 RX ADMIN — ENOXAPARIN SODIUM 40 MILLIGRAM(S): 100 INJECTION SUBCUTANEOUS at 06:28

## 2024-04-29 RX ADMIN — OXYCODONE HYDROCHLORIDE 10 MILLIGRAM(S): 5 TABLET ORAL at 21:11

## 2024-04-29 RX ADMIN — Medication 650 MILLIGRAM(S): at 23:00

## 2024-04-29 RX ADMIN — OXYCODONE HYDROCHLORIDE 5 MILLIGRAM(S): 5 TABLET ORAL at 16:03

## 2024-04-29 RX ADMIN — LOSARTAN POTASSIUM 50 MILLIGRAM(S): 100 TABLET, FILM COATED ORAL at 14:30

## 2024-04-29 RX ADMIN — GABAPENTIN 400 MILLIGRAM(S): 400 CAPSULE ORAL at 14:33

## 2024-04-29 RX ADMIN — Medication 10 MILLIGRAM(S): at 21:10

## 2024-04-29 RX ADMIN — APREPITANT 40 MILLIGRAM(S): 80 CAPSULE ORAL at 06:28

## 2024-04-29 RX ADMIN — PANTOPRAZOLE SODIUM 40 MILLIGRAM(S): 20 TABLET, DELAYED RELEASE ORAL at 12:53

## 2024-04-29 RX ADMIN — Medication 25 GRAM(S): at 23:11

## 2024-04-29 RX ADMIN — BUPROPION HYDROCHLORIDE 150 MILLIGRAM(S): 150 TABLET, EXTENDED RELEASE ORAL at 14:30

## 2024-04-29 RX ADMIN — SPIRONOLACTONE 50 MILLIGRAM(S): 25 TABLET, FILM COATED ORAL at 14:31

## 2024-04-29 RX ADMIN — ENOXAPARIN SODIUM 40 MILLIGRAM(S): 100 INJECTION SUBCUTANEOUS at 21:10

## 2024-04-29 NOTE — PATIENT PROFILE ADULT - FUNCTIONAL ASSESSMENT - BASIC MOBILITY 6.
Pt states hx of c-diff, dx on 7-14, pt has been treated twice for c-diff here, pt states she last pm noted abd pain, this am began having diarrhea, pt states looks and smells like c-diff  Sepsis Screening completed    (  )Patient meets SIRS criteria. (  x)Patient does not meet SIRS criteria.       SIRS Criteria is achieved when two or more of the following are present   Temperature < 96.8°F (36°C) or > 100.9°F (38.3°C)   Heart Rate > 90 beats per minute   Respiratory Rate > 20 breaths per minute   WBC count > 12,000 or <4,000 or > 10% bands 4 = No assist / stand by assistance

## 2024-04-29 NOTE — CHART NOTE - NSCHARTNOTEFT_GEN_A_CORE
PACU ANESTHESIA ADMISSION NOTE      Procedure: Robot-assisted hiatal herniorrhaphy using da Madhuri Xi    Creation, bypass, gastric, laparoscopic, robot-assisted      Post op diagnosis:  Hiatal hernia    Class 3 obesity        __x__  Patent Airway    __x__  Full return of protective reflexes    ____  Full recovery from anesthesia / back to baseline     Vitals:  see anesthesia record    Mental Status:  __x__ Awake   _____ Alert   _____ Drowsy   _____ Sedated    Nausea/Vomiting:  __x__ NO  ______Yes,   See Post - Op Orders          Pain Scale (0-10):  _____    Treatment: ____ None    ___x_ See Post - Op/PCA Orders    Post - Operative Fluids:   ____ Oral   __x__ See Post - Op Orders    Plan: Discharge:   ____Home       __x___Floor     _____Critical Care    _____  Other:_________________    Comments:  Uneventful intraoperative course. No anesthesia issues or complications noted. Patient stable upon arrival to PACU. Report given to RN.

## 2024-04-29 NOTE — PATIENT PROFILE ADULT - FALL HARM RISK - HARM RISK INTERVENTIONS

## 2024-04-29 NOTE — CHART NOTE - NSCHARTNOTEFT_GEN_A_CORE
HPI/Interval Events: Patient now s/p S/P GASTRIC BYPASS, HIATAL HERNIA REPAIR. No acute intervening events. Patient feels okay, with no significant complaints. Pain controlled. Tolerating PO, voiding. No f/c/n/v, chest pain, SOB, urinary symptoms, hematochezia, melena.    MEDICATIONS  (STANDING):  acetaminophen   IVPB .. 1000 milliGRAM(s) IV Intermittent once  amitriptyline 10 milliGRAM(s) Oral at bedtime  buPROPion XL (24-Hour) . 150 milliGRAM(s) Oral daily  DULoxetine 60 milliGRAM(s) Oral daily  enoxaparin Injectable 40 milliGRAM(s) SubCutaneous every 24 hours  gabapentin Solution 400 milliGRAM(s) Oral three times a day  lactated ringers. 1000 milliLiter(s) (150 mL/Hr) IV Continuous <Continuous>  losartan 50 milliGRAM(s) Oral daily  pantoprazole  Injectable 40 milliGRAM(s) IV Push every 24 hours  spironolactone 50 milliGRAM(s) Oral daily    MEDICATIONS  (PRN):  albuterol    90 MICROgram(s) HFA Inhaler 2 Puff(s) Inhalation every 6 hours PRN Shortness of Breath and/or Wheezing  hyoscyamine SL 0.125 milliGRAM(s) SubLingual every 4 hours PRN Gastric Spasms  ondansetron Injectable 4 milliGRAM(s) IV Push every 6 hours PRN Nausea and/or Vomiting  oxyCODONE    IR 5 milliGRAM(s) Oral every 6 hours PRN Moderate Pain (4 - 6)      Vital Signs Last 24 Hrs  T(C): 36.2 (29 Apr 2024 15:58), Max: 36.8 (29 Apr 2024 05:59)  T(F): 97.1 (29 Apr 2024 15:58), Max: 98.3 (29 Apr 2024 12:07)  HR: 83 (29 Apr 2024 15:58) (83 - 109)  BP: 132/72 (29 Apr 2024 15:58) (130/74 - 155/83)  BP(mean): --  RR: 18 (29 Apr 2024 15:58) (15 - 18)  SpO2: 94% (29 Apr 2024 15:58) (94% - 97%)    Parameters below as of 29 Apr 2024 13:15  Patient On (Oxygen Delivery Method): room air        Gen: patient laying in bed, A&Ox3, NAD  Pulm: regular respiratory rate, no distress  CV: RRR  GI: Abdominal incisions c/d/i. Abdomen soft, non-distended, TTP around incision sites w/o rebound or guarding  Ext: Warm, pink, no edema or lesions noted      I&O's Detail    29 Apr 2024 07:01  -  29 Apr 2024 16:27  --------------------------------------------------------  IN:    Lactated Ringers: 450 mL  Total IN: 450 mL    OUT:    Voided (mL): 200 mL  Total OUT: 200 mL    Total NET: 250 mL          LABS:                A/P: 58y Female w/PMH of ASTHMA, DEPRESSION, GERD, HTN, MIGRAINES, RAFA, RENAL STONES, BLADDER REPAIR SURGERY, now s/p S/P GASTRIC BYPASS, HIATAL HERNIA REPAIR. Stable post-op on floor.    Plan:     c/w bariatric CLD + IVF   Zofran PRN for nausea/vomiting  monitor for ROBF  pain control   encourage ambulation and incentive spirometry   DVT/GI ppx   replete electrolytes as needed   resume home PO meds     Dispo: likely DC home tomorrow no needs

## 2024-04-30 ENCOUNTER — TRANSCRIPTION ENCOUNTER (OUTPATIENT)
Age: 59
End: 2024-04-30

## 2024-04-30 LAB
ANION GAP SERPL CALC-SCNC: 13 MMOL/L — SIGNIFICANT CHANGE UP (ref 7–14)
BASOPHILS # BLD AUTO: 0.03 K/UL — SIGNIFICANT CHANGE UP (ref 0–0.2)
BASOPHILS NFR BLD AUTO: 0.2 % — SIGNIFICANT CHANGE UP (ref 0–1)
BUN SERPL-MCNC: 12 MG/DL — SIGNIFICANT CHANGE UP (ref 10–20)
CALCIUM SERPL-MCNC: 9.2 MG/DL — SIGNIFICANT CHANGE UP (ref 8.4–10.4)
CHLORIDE SERPL-SCNC: 100 MMOL/L — SIGNIFICANT CHANGE UP (ref 98–110)
CO2 SERPL-SCNC: 25 MMOL/L — SIGNIFICANT CHANGE UP (ref 17–32)
CREAT SERPL-MCNC: 0.7 MG/DL — SIGNIFICANT CHANGE UP (ref 0.7–1.5)
EGFR: 100 ML/MIN/1.73M2 — SIGNIFICANT CHANGE UP
EOSINOPHIL # BLD AUTO: 0.01 K/UL — SIGNIFICANT CHANGE UP (ref 0–0.7)
EOSINOPHIL NFR BLD AUTO: 0.1 % — SIGNIFICANT CHANGE UP (ref 0–8)
GLUCOSE BLDC GLUCOMTR-MCNC: 105 MG/DL — HIGH (ref 70–99)
GLUCOSE BLDC GLUCOMTR-MCNC: 108 MG/DL — HIGH (ref 70–99)
GLUCOSE BLDC GLUCOMTR-MCNC: 165 MG/DL — HIGH (ref 70–99)
GLUCOSE BLDC GLUCOMTR-MCNC: 93 MG/DL — SIGNIFICANT CHANGE UP (ref 70–99)
GLUCOSE SERPL-MCNC: 129 MG/DL — HIGH (ref 70–99)
HCT VFR BLD CALC: 42.3 % — SIGNIFICANT CHANGE UP (ref 37–47)
HGB BLD-MCNC: 13.1 G/DL — SIGNIFICANT CHANGE UP (ref 12–16)
IMM GRANULOCYTES NFR BLD AUTO: 0.4 % — HIGH (ref 0.1–0.3)
LYMPHOCYTES # BLD AUTO: 1.69 K/UL — SIGNIFICANT CHANGE UP (ref 1.2–3.4)
LYMPHOCYTES # BLD AUTO: 10.4 % — LOW (ref 20.5–51.1)
MAGNESIUM SERPL-MCNC: 2.4 MG/DL — SIGNIFICANT CHANGE UP (ref 1.8–2.4)
MCHC RBC-ENTMCNC: 27 PG — SIGNIFICANT CHANGE UP (ref 27–31)
MCHC RBC-ENTMCNC: 31 G/DL — LOW (ref 32–37)
MCV RBC AUTO: 87 FL — SIGNIFICANT CHANGE UP (ref 81–99)
MONOCYTES # BLD AUTO: 1.1 K/UL — HIGH (ref 0.1–0.6)
MONOCYTES NFR BLD AUTO: 6.8 % — SIGNIFICANT CHANGE UP (ref 1.7–9.3)
NEUTROPHILS # BLD AUTO: 13.35 K/UL — HIGH (ref 1.4–6.5)
NEUTROPHILS NFR BLD AUTO: 82.1 % — HIGH (ref 42.2–75.2)
NRBC # BLD: 0 /100 WBCS — SIGNIFICANT CHANGE UP (ref 0–0)
PHOSPHATE SERPL-MCNC: 3.4 MG/DL — SIGNIFICANT CHANGE UP (ref 2.1–4.9)
PLATELET # BLD AUTO: 307 K/UL — SIGNIFICANT CHANGE UP (ref 130–400)
PMV BLD: 10.3 FL — SIGNIFICANT CHANGE UP (ref 7.4–10.4)
POTASSIUM SERPL-MCNC: 4.2 MMOL/L — SIGNIFICANT CHANGE UP (ref 3.5–5)
POTASSIUM SERPL-SCNC: 4.2 MMOL/L — SIGNIFICANT CHANGE UP (ref 3.5–5)
RBC # BLD: 4.86 M/UL — SIGNIFICANT CHANGE UP (ref 4.2–5.4)
RBC # FLD: 14.8 % — HIGH (ref 11.5–14.5)
SODIUM SERPL-SCNC: 138 MMOL/L — SIGNIFICANT CHANGE UP (ref 135–146)
WBC # BLD: 16.25 K/UL — HIGH (ref 4.8–10.8)
WBC # FLD AUTO: 16.25 K/UL — HIGH (ref 4.8–10.8)

## 2024-04-30 PROCEDURE — 44238 UNLISTED LAPS PX INTESTINE: CPT | Mod: 78

## 2024-04-30 PROCEDURE — 44360 SMALL BOWEL ENDOSCOPY: CPT | Mod: 78

## 2024-04-30 PROCEDURE — 74177 CT ABD & PELVIS W/CONTRAST: CPT | Mod: 26

## 2024-04-30 RX ORDER — HYDROMORPHONE HYDROCHLORIDE 2 MG/ML
0.5 INJECTION INTRAMUSCULAR; INTRAVENOUS; SUBCUTANEOUS ONCE
Refills: 0 | Status: DISCONTINUED | OUTPATIENT
Start: 2024-04-30 | End: 2024-04-30

## 2024-04-30 RX ORDER — SIMETHICONE 80 MG/1
80 TABLET, CHEWABLE ORAL
Refills: 0 | Status: DISCONTINUED | OUTPATIENT
Start: 2024-04-30 | End: 2024-04-30

## 2024-04-30 RX ORDER — ACETAMINOPHEN 500 MG
2 TABLET ORAL
Qty: 0 | Refills: 0 | DISCHARGE
Start: 2024-04-30

## 2024-04-30 RX ORDER — SCOPALAMINE 1 MG/3D
1 PATCH, EXTENDED RELEASE TRANSDERMAL ONCE
Refills: 0 | Status: COMPLETED | OUTPATIENT
Start: 2024-04-30 | End: 2024-04-30

## 2024-04-30 RX ORDER — ACETAMINOPHEN 500 MG
1000 TABLET ORAL ONCE
Refills: 0 | Status: COMPLETED | OUTPATIENT
Start: 2024-04-30 | End: 2024-04-30

## 2024-04-30 RX ORDER — IOHEXOL 300 MG/ML
30 INJECTION, SOLUTION INTRAVENOUS ONCE
Refills: 0 | Status: DISCONTINUED | OUTPATIENT
Start: 2024-04-30 | End: 2024-04-30

## 2024-04-30 RX ORDER — KETOROLAC TROMETHAMINE 30 MG/ML
15 SYRINGE (ML) INJECTION EVERY 6 HOURS
Refills: 0 | Status: DISCONTINUED | OUTPATIENT
Start: 2024-04-30 | End: 2024-04-30

## 2024-04-30 RX ORDER — KETOROLAC TROMETHAMINE 30 MG/ML
15 SYRINGE (ML) INJECTION ONCE
Refills: 0 | Status: DISCONTINUED | OUTPATIENT
Start: 2024-04-30 | End: 2024-04-30

## 2024-04-30 RX ORDER — PROCHLORPERAZINE MALEATE 5 MG
10 TABLET ORAL ONCE
Refills: 0 | Status: COMPLETED | OUTPATIENT
Start: 2024-04-30 | End: 2024-04-30

## 2024-04-30 RX ORDER — OXYCODONE HYDROCHLORIDE 5 MG/1
7.5 TABLET ORAL EVERY 6 HOURS
Refills: 0 | Status: DISCONTINUED | OUTPATIENT
Start: 2024-04-30 | End: 2024-04-30

## 2024-04-30 RX ORDER — ONDANSETRON 8 MG/1
4 TABLET, FILM COATED ORAL ONCE
Refills: 0 | Status: COMPLETED | OUTPATIENT
Start: 2024-04-30 | End: 2024-04-30

## 2024-04-30 RX ADMIN — SODIUM CHLORIDE 150 MILLILITER(S): 9 INJECTION, SOLUTION INTRAVENOUS at 05:23

## 2024-04-30 RX ADMIN — Medication 15 MILLIGRAM(S): at 15:30

## 2024-04-30 RX ADMIN — SIMETHICONE 80 MILLIGRAM(S): 80 TABLET, CHEWABLE ORAL at 17:08

## 2024-04-30 RX ADMIN — Medication 400 MILLIGRAM(S): at 16:08

## 2024-04-30 RX ADMIN — HYDROMORPHONE HYDROCHLORIDE 0.5 MILLIGRAM(S): 2 INJECTION INTRAMUSCULAR; INTRAVENOUS; SUBCUTANEOUS at 09:16

## 2024-04-30 RX ADMIN — OXYCODONE HYDROCHLORIDE 7.5 MILLIGRAM(S): 5 TABLET ORAL at 10:07

## 2024-04-30 RX ADMIN — GABAPENTIN 400 MILLIGRAM(S): 400 CAPSULE ORAL at 05:19

## 2024-04-30 RX ADMIN — SCOPALAMINE 1 PATCH: 1 PATCH, EXTENDED RELEASE TRANSDERMAL at 15:28

## 2024-04-30 RX ADMIN — SIMETHICONE 80 MILLIGRAM(S): 80 TABLET, CHEWABLE ORAL at 11:38

## 2024-04-30 RX ADMIN — PANTOPRAZOLE SODIUM 40 MILLIGRAM(S): 20 TABLET, DELAYED RELEASE ORAL at 12:45

## 2024-04-30 RX ADMIN — SODIUM CHLORIDE 150 MILLILITER(S): 9 INJECTION, SOLUTION INTRAVENOUS at 17:08

## 2024-04-30 RX ADMIN — OXYCODONE HYDROCHLORIDE 10 MILLIGRAM(S): 5 TABLET ORAL at 03:56

## 2024-04-30 RX ADMIN — ONDANSETRON 4 MILLIGRAM(S): 8 TABLET, FILM COATED ORAL at 10:31

## 2024-04-30 RX ADMIN — HYDROMORPHONE HYDROCHLORIDE 0.5 MILLIGRAM(S): 2 INJECTION INTRAMUSCULAR; INTRAVENOUS; SUBCUTANEOUS at 09:31

## 2024-04-30 RX ADMIN — SPIRONOLACTONE 50 MILLIGRAM(S): 25 TABLET, FILM COATED ORAL at 05:19

## 2024-04-30 RX ADMIN — Medication 0.12 MILLIGRAM(S): at 05:19

## 2024-04-30 RX ADMIN — Medication 1000 MILLIGRAM(S): at 16:23

## 2024-04-30 RX ADMIN — BUPROPION HYDROCHLORIDE 150 MILLIGRAM(S): 150 TABLET, EXTENDED RELEASE ORAL at 11:38

## 2024-04-30 RX ADMIN — ONDANSETRON 4 MILLIGRAM(S): 8 TABLET, FILM COATED ORAL at 12:45

## 2024-04-30 RX ADMIN — DULOXETINE HYDROCHLORIDE 60 MILLIGRAM(S): 30 CAPSULE, DELAYED RELEASE ORAL at 11:38

## 2024-04-30 RX ADMIN — LOSARTAN POTASSIUM 50 MILLIGRAM(S): 100 TABLET, FILM COATED ORAL at 05:19

## 2024-04-30 RX ADMIN — Medication 15 MILLIGRAM(S): at 15:45

## 2024-04-30 RX ADMIN — OXYCODONE HYDROCHLORIDE 10 MILLIGRAM(S): 5 TABLET ORAL at 06:00

## 2024-04-30 RX ADMIN — Medication 650 MILLIGRAM(S): at 06:00

## 2024-04-30 RX ADMIN — GABAPENTIN 400 MILLIGRAM(S): 400 CAPSULE ORAL at 14:16

## 2024-04-30 RX ADMIN — Medication 650 MILLIGRAM(S): at 05:19

## 2024-04-30 RX ADMIN — ONDANSETRON 4 MILLIGRAM(S): 8 TABLET, FILM COATED ORAL at 04:01

## 2024-04-30 RX ADMIN — OXYCODONE HYDROCHLORIDE 7.5 MILLIGRAM(S): 5 TABLET ORAL at 10:37

## 2024-04-30 RX ADMIN — ONDANSETRON 4 MILLIGRAM(S): 8 TABLET, FILM COATED ORAL at 18:46

## 2024-04-30 RX ADMIN — Medication 10 MILLIGRAM(S): at 14:16

## 2024-04-30 RX ADMIN — Medication 650 MILLIGRAM(S): at 11:38

## 2024-04-30 NOTE — PROVIDER CONTACT NOTE (OTHER) - SITUATION
pt received stat dose of Zofran as per order, pt continues to have emesis and nausea. /70 hr 79, temp 97.1 F oally, resp 18 pulse ox 97% pt received stat dose of Zofran as per order, pt continues to have emesis and nausea. /70 hr 79, temp 97.1 F oally, resp 18 pulse ox 97% pt declining to ambulate due to nausea. pt oob to ch

## 2024-04-30 NOTE — PRE-OP CHECKLIST - STERILIZATION AFFIRMATION
n/a
no vomiting/no double vision, no eye pain,  bladder dysfunction, no chest pain,/no numbness/no tingling/no nausea

## 2024-04-30 NOTE — PRE-ANESTHESIA EVALUATION ADULT - NSPROPOSEDPROCEDFT_GEN_ALL_CORE
exploratory laparoscopy, possible lysis of adhesions, possible bypass revision, possible upper endoscopy
robotic gastric bypass

## 2024-04-30 NOTE — DISCHARGE NOTE PROVIDER - HOSPITAL COURSE
This 58YOF who underwent robotic-assisted marcio-en-y gastric bypass with hiatal hernia repair. Bariatric ERAS protocol followed to include preoperative and perioperative use of DVT, SSI prophylaxis as well as multi-modal non-opioid analgesics. Patient tolerated the procedure well, extubated in the operating room then transferred to the PACU in stable condition. Once hemodynamically stable and effective pain control the patient was able to ambulate with assistance. The patient was later transferred to a bariatric unit and placed on bedside continuous monitoring. Postoperative pain management included multi-modal non-opioid analgesics. The patient started tolerating bariatric clear liquid the evening of surgery.     On POD #1 patient remained stable with no acute events overnight, has effective pain control. Denies nausea and vomiting. Patient is ambulating independently and voiding as expected. The rest of the hospital course was uneventful, patient met [facility]-Point Bariatric Surgery D/C criteria and subsequently cleared for discharge home on POD#1.  No extended VTE prophylaxis required (calculated [facility] VTE Risk Stratification low). The patient will follow a protocol-derived staged meal progression supervised by the dietitian in the outpatient setting. Patient will follow-up with Dr. Montoya in 7-10 days, medical doctor and dietitian in 30 days. All appropriate prescriptions obtained from prior to discharge. Written discharge instruction explained and given to include VTE prevention. This 58YOF who underwent robotic-assisted marcio-en-y gastric bypass with hiatal hernia repair on 4/29. Bariatric ERAS protocol followed to include preoperative and perioperative use of DVT, SSI prophylaxis as well as multi-modal non-opioid analgesics. Patient tolerated the procedure well, extubated in the operating room then transferred to the PACU in stable condition. Once hemodynamically stable and effective pain control the patient was able to ambulate with assistance. The patient was later transferred to a bariatric unit and placed on bedside continuous monitoring. Postoperative pain management included multi-modal non-opioid analgesics. The patient started tolerating bariatric clear liquid the evening of surgery.     On POD #1 patient was began to have nausea and vomiting that progressively worsened concerning for obstruction. She was take back to the operating room on 4/30 and was found to have an obstruction at her jejunojejunal anastomosis. The anastomosis was revised and the abdomen was explored, confirming no internal hernias or other pathology. Upper endoscopy showed no leak.     remained stable with no acute events overnight, has effective pain control. Denies nausea and vomiting. Patient is ambulating independently and voiding as expected. The rest of the hospital course was uneventful, patient met 8-Point Bariatric Surgery D/C criteria and subsequently cleared for discharge home on POD#1.  No extended VTE prophylaxis required (calculated [facility] VTE Risk Stratification low). The patient will follow a protocol-derived staged meal progression supervised by the dietitian in the outpatient setting. Patient will follow-up with Dr. Montoya in 7-10 days, medical doctor and dietitian in 30 days. All appropriate prescriptions obtained from prior to discharge. Written discharge instruction explained and given to include VTE prevention. This 58YOF who underwent robotic-assisted marcio-en-y gastric bypass with hiatal hernia repair on 4/29. Bariatric ERAS protocol followed to include preoperative and perioperative use of DVT, SSI prophylaxis as well as multi-modal non-opioid analgesics. Patient tolerated the procedure well, extubated in the operating room then transferred to the PACU in stable condition. Once hemodynamically stable and effective pain control the patient was able to ambulate with assistance. The patient was later transferred to a bariatric unit and placed on bedside continuous monitoring. Postoperative pain management included multi-modal non-opioid analgesics. The patient started tolerating bariatric clear liquid the evening of surgery.     On POD #1 patient was began to have nausea and vomiting that progressively worsened concerning for obstruction. She was take back to the operating room on 4/30 and was found to have an obstruction at her jejunojejunal anastomosis. The anastomosis was revised and the abdomen was explored, confirming no internal hernias or other pathology. Upper endoscopy showed no leak. Postoperatively her respiratory status declined due to aspiration and she was transferred to the SICU. Despite noninvasive ventilatory support, her respiratory status did not improve and she was intubated. Following treatement for ARDS and pneumonia and diuresis, her respiratory status improved and she was extubated. She was downgraded to the floor, started on B2 diet and her functional status improved. She is ambulating with a walker and 2L NC O2. She is voiding and having normal bowel function. She has been deemed stable for discharge to home with family support and home oxygen. The patient will follow a protocol-derived staged meal progression supervised by the dietitian in the outpatient setting. Patient will follow-up with Dr. Montoya in 7-10 days, medical doctor, cardiologist, and dietitian in 30 days. All appropriate prescriptions obtained from prior to discharge. Written discharge instruction explained and given to include VTE prevention.

## 2024-04-30 NOTE — DISCHARGE NOTE PROVIDER - NSDCCPTREATMENT_GEN_ALL_CORE_FT
PRINCIPAL PROCEDURE  Procedure: Creation, bypass, gastric, laparoscopic, robot-assisted  Findings and Treatment:       SECONDARY PROCEDURE  Procedure: Robot-assisted hiatal herniorrhaphy using da Madhuri Xi  Findings and Treatment:

## 2024-04-30 NOTE — DISCHARGE NOTE PROVIDER - NSDCFUSCHEDAPPT_GEN_ALL_CORE_FT
[facility]  GENSUR [address]  Scheduled Appointment: 05/10/2024

## 2024-04-30 NOTE — DISCHARGE NOTE PROVIDER - NSDCACTIVITY_GEN_ALL_CORE
Return to Work/School allowed/Showering allowed/Stairs allowed/Driving allowed/Walking - Indoors allowed/No heavy lifting/straining/Walking - Outdoors allowed/Follow Instructions Provided by your Surgical Team/Activity as tolerated

## 2024-04-30 NOTE — PROVIDER CONTACT NOTE (OTHER) - SITUATION
pt is c/o abd pain Rn administered stat 0.5mg Dilaudid at 0915 and pt just received 7.5mg Oxy at this time. pt stated she feels abd fullness

## 2024-04-30 NOTE — PROVIDER CONTACT NOTE (OTHER) - SITUATION
pt was tolerating diet and meds until this time, pt took in 4 teaspoons of broth and 1 teaspoon of sugar free jello. pt had another episode of emesis at this time pt was tolerating diet and meds until this time, pt took in 4 teaspoons of broth and 1 teaspoon of sugar free jello. pt had another episode of emesis at this time. pt declining ambulation at this time

## 2024-04-30 NOTE — DISCHARGE NOTE PROVIDER - NSDCFUADDINST_GEN_ALL_CORE_FT
Activity: No heavy lifting > 10 lbs for 4 weeks. Avoid straining or excessive activity x 6 weeks. No swimming, soaking or baths for 2 weeks  Dressings: Do not scrub wounds. You may shower but do not bathe. Skin glue will fall off on its own in 1-2 weeks.  Pain control: You may take over-the-counter tylenol three times per day with food. If pain continues, may take prescription pain medications (percocet). Percocet contains tylenol, maximum dose of tylenol per day is 4000mg. May use ice packs or heat packs for pain and swelling.   Diet: Please continue bariatric liquid diet.  Follow up: Please call the number provided to confirm your appointment with Dr. Montoya in 1 weeks. Please call with any questions or concerns including fevers, worsening pain, pus from the wounds, or redness of the skin.   Activity: No heavy lifting > 10 lbs for 2 weeks. Avoid straining or excessive activity x 4 weeks.  Dressings: Do not scrub wounds. You may shower normally. Skin glue will fall off on its own in 1-2 weeks.  Pain control: You may take over-the-counter tylenol three times per day with food. If pain continues, may take prescription pain medications (percocet). Percocet contains tylenol, maximum dose of tylenol per day is 4000mg. May use ice packs or heat packs for pain and swelling.   Diet: Please continue bariatric soft diet.  Follow up: Please call the number provided to confirm your appointment with Dr. Montoya in 1 weeks. Please call with any questions or concerns including fevers, worsening pain, pus from the wounds, or redness of the skin.

## 2024-04-30 NOTE — PROVIDER CONTACT NOTE (OTHER) - DATE AND TIME:
30-Apr-2024 11:00
30-Apr-2024 12:35
30-Apr-2024 15:42
30-Apr-2024 10:12
30-Apr-2024 11:45
30-Apr-2024 13:10
30-Apr-2024 18:24

## 2024-04-30 NOTE — PROVIDER CONTACT NOTE (OTHER) - ACTION/TREATMENT ORDERED:
Provider aware of the above. provider stated to hold PO Tylenol if IV is given at 1600. provider to order stat IV Compazine for nausea since pt threw up PO dose Provider aware of the above. provider stated to hold PO Tylenol if IV is given at 1600. provider to order stat IV Compazine for nausea since pt threw up PO dose if team agrees

## 2024-04-30 NOTE — PROVIDER CONTACT NOTE (OTHER) - SITUATION
pt given PO Compazine at 1416 when Rn went to reassess pt at this time, pt reported having another episode of emesis. pt is declining to walk at this time r/t generalized discomfort and nausea.

## 2024-04-30 NOTE — DISCHARGE NOTE PROVIDER - CARE PROVIDER_API CALL
[facility]  [address] [place] [address], [address] [place] [address]  Phone: [phone]  Fax: [phone]  Established Patient  Follow Up Time: 1 week

## 2024-04-30 NOTE — PROVIDER CONTACT NOTE (OTHER) - BACKGROUND
Rn gave stat Toradol and Scopalamine patch. pt is ordered for IV Tylenol at 1600, pt received a PO dose at 1138am, is it okay to administer IV Tylenol at 1600. Also should Rn then hold 1800 PO Tylenol
until symptoms resolved.

## 2024-04-30 NOTE — PROGRESS NOTE ADULT - ASSESSMENT
ASSESSMENT:  58y Female w/PMH of ASTHMA, DEPRESSION, GERD, HTN, MIGRAINES, RAFA, RENAL STONES, BLADDER REPAIR SURGERY, now s/p S/P GASTRIC BYPASS, HIATAL HERNIA REPAIR.     PLAN:  - Continue joaquin CLD   - Encourage ambulation   - Pain control   - Hemodynamic monitoring   - DVT ppx   - Dispo planning         BLUE TEAM SPECTRA: [id number]

## 2024-04-30 NOTE — PRE-OP CHECKLIST - COMMENTS
pt had jello at 530 pm but threw up after pt had jello at 530 pm but threw up after, anesthesia team aware

## 2024-04-30 NOTE — PROVIDER CONTACT NOTE (OTHER) - SITUATION
pt was given Zofran at 1030am, pt had a episode of emesis after. pt still c/o nausea. pt has some pain, Tylenol given as ordered. pt also refused to sign intent for d/c tomorrow 5/1/24 a this time

## 2024-04-30 NOTE — DISCHARGE NOTE PROVIDER - NSDCMRMEDTOKEN_GEN_ALL_CORE_FT
acetaminophen 325 mg oral tablet: 2 tab(s) orally every 6 hours  amitriptyline 10 mg oral tablet: 1 tab(s) orally once a day (at bedtime)  buPROPion 150 mg/24 hours (XL) oral tablet, extended release: 1 tab(s) orally once a day  DULoxetine 60 mg oral delayed release capsule: 1 cap(s) orally 2 times a day  famotidine 40 mg oral tablet: 1 tab(s) orally once a day  loratadine 10 mg oral tablet: 1 tab(s) orally once a day  losartan 50 mg oral tablet: 1 tab(s) orally once a day (at bedtime)  montelukast 10 mg oral tablet: 1 tab(s) orally once a day (at bedtime)  oxycodone-acetaminophen 7.5 mg-325 mg oral tablet: 1 tab(s) orally 3 times a day as needed for prn back/neck pain  pantoprazole 20 mg oral delayed release tablet: 1 tab(s) orally once a day (at bedtime)  spironolactone 50 mg oral tablet: 1 tab(s) orally once a day  Ubrelvy 100 mg oral tablet: 1 tab(s) orally as needed for migraine   acetaminophen 325 mg oral tablet: 2 tab(s) orally every 6 hours  amitriptyline 10 mg oral tablet: 1 tab(s) orally once a day (at bedtime)  buPROPion 150 mg/24 hours (XL) oral tablet, extended release: 1 tab(s) orally once a day  DULoxetine 60 mg oral delayed release capsule: 1 cap(s) orally 2 times a day  famotidine 40 mg oral tablet: 1 tab(s) orally once a day  loratadine 10 mg oral tablet: 1 tab(s) orally once a day  montelukast 10 mg oral tablet: 1 tab(s) orally once a day (at bedtime)  Mucus Relief  mg oral tablet, extended release: 600 milligram(s) orally 2 times a day  oxycodone-acetaminophen 7.5 mg-325 mg oral tablet: 1 tab(s) orally 3 times a day as needed for prn back/neck pain  pantoprazole 20 mg oral delayed release tablet: 1 tab(s) orally once a day (at bedtime)  Ubrelvy 100 mg oral tablet: 1 tab(s) orally as needed for migraine   amitriptyline 10 mg oral tablet: 1 tab(s) orally once a day (at bedtime)  buPROPion 150 mg/24 hours (XL) oral tablet, extended release: 1 tab(s) orally once a day  DULoxetine 60 mg oral delayed release capsule: 1 cap(s) orally 2 times a day  famotidine 40 mg oral tablet: 1 tab(s) orally once a day  loratadine 10 mg oral tablet: 1 tab(s) orally once a day  montelukast 10 mg oral tablet: 1 tab(s) orally once a day (at bedtime)  Mucus Relief  mg oral tablet, extended release: 600 milligram(s) orally 2 times a day  oxycodone-acetaminophen 7.5 mg-325 mg oral tablet: 1 tab(s) orally 3 times a day as needed for prn back/neck pain  pantoprazole 40 mg oral delayed release tablet: 1 tab(s) orally once a day  spironolactone 25 mg oral tablet: 2 tab(s) orally once a day  Ubrelvy 100 mg oral tablet: 1 tab(s) orally as needed for migraine

## 2024-05-01 LAB
ANION GAP SERPL CALC-SCNC: 11 MMOL/L — SIGNIFICANT CHANGE UP (ref 7–14)
ANION GAP SERPL CALC-SCNC: 13 MMOL/L — SIGNIFICANT CHANGE UP (ref 7–14)
ANION GAP SERPL CALC-SCNC: 14 MMOL/L — SIGNIFICANT CHANGE UP (ref 7–14)
APTT BLD: 28.7 SEC — SIGNIFICANT CHANGE UP (ref 27–39.2)
BASOPHILS # BLD AUTO: 0.02 K/UL — SIGNIFICANT CHANGE UP (ref 0–0.2)
BASOPHILS # BLD AUTO: 0.02 K/UL — SIGNIFICANT CHANGE UP (ref 0–0.2)
BASOPHILS # BLD AUTO: 0.05 K/UL — SIGNIFICANT CHANGE UP (ref 0–0.2)
BASOPHILS NFR BLD AUTO: 0.2 % — SIGNIFICANT CHANGE UP (ref 0–1)
BASOPHILS NFR BLD AUTO: 0.2 % — SIGNIFICANT CHANGE UP (ref 0–1)
BASOPHILS NFR BLD AUTO: 0.3 % — SIGNIFICANT CHANGE UP (ref 0–1)
BUN SERPL-MCNC: 14 MG/DL — SIGNIFICANT CHANGE UP (ref 10–20)
BUN SERPL-MCNC: 15 MG/DL — SIGNIFICANT CHANGE UP (ref 10–20)
BUN SERPL-MCNC: 15 MG/DL — SIGNIFICANT CHANGE UP (ref 10–20)
CALCIUM SERPL-MCNC: 8.3 MG/DL — LOW (ref 8.4–10.5)
CALCIUM SERPL-MCNC: 8.4 MG/DL — SIGNIFICANT CHANGE UP (ref 8.4–10.5)
CALCIUM SERPL-MCNC: 8.6 MG/DL — SIGNIFICANT CHANGE UP (ref 8.4–10.5)
CHLORIDE SERPL-SCNC: 102 MMOL/L — SIGNIFICANT CHANGE UP (ref 98–110)
CHLORIDE SERPL-SCNC: 102 MMOL/L — SIGNIFICANT CHANGE UP (ref 98–110)
CHLORIDE SERPL-SCNC: 103 MMOL/L — SIGNIFICANT CHANGE UP (ref 98–110)
CK MB CFR SERPL CALC: 4.7 NG/ML — SIGNIFICANT CHANGE UP (ref 0.6–6.3)
CK SERPL-CCNC: 275 U/L — HIGH (ref 0–225)
CO2 SERPL-SCNC: 23 MMOL/L — SIGNIFICANT CHANGE UP (ref 17–32)
CO2 SERPL-SCNC: 25 MMOL/L — SIGNIFICANT CHANGE UP (ref 17–32)
CO2 SERPL-SCNC: 25 MMOL/L — SIGNIFICANT CHANGE UP (ref 17–32)
CREAT ?TM UR-MCNC: 84 MG/DL — SIGNIFICANT CHANGE UP
CREAT SERPL-MCNC: 1 MG/DL — SIGNIFICANT CHANGE UP (ref 0.7–1.5)
CREAT SERPL-MCNC: 1.2 MG/DL — SIGNIFICANT CHANGE UP (ref 0.7–1.5)
CREAT SERPL-MCNC: 1.5 MG/DL — SIGNIFICANT CHANGE UP (ref 0.7–1.5)
EGFR: 40 ML/MIN/1.73M2 — LOW
EGFR: 52 ML/MIN/1.73M2 — LOW
EGFR: 65 ML/MIN/1.73M2 — SIGNIFICANT CHANGE UP
EOSINOPHIL # BLD AUTO: 0 K/UL — SIGNIFICANT CHANGE UP (ref 0–0.7)
EOSINOPHIL # BLD AUTO: 0.01 K/UL — SIGNIFICANT CHANGE UP (ref 0–0.7)
EOSINOPHIL # BLD AUTO: 0.01 K/UL — SIGNIFICANT CHANGE UP (ref 0–0.7)
EOSINOPHIL NFR BLD AUTO: 0 % — SIGNIFICANT CHANGE UP (ref 0–8)
EOSINOPHIL NFR BLD AUTO: 0 % — SIGNIFICANT CHANGE UP (ref 0–8)
EOSINOPHIL NFR BLD AUTO: 0.1 % — SIGNIFICANT CHANGE UP (ref 0–8)
GAS PNL BLDA: SIGNIFICANT CHANGE UP
GAS PNL BLDA: SIGNIFICANT CHANGE UP
GLUCOSE BLDC GLUCOMTR-MCNC: 101 MG/DL — HIGH (ref 70–99)
GLUCOSE BLDC GLUCOMTR-MCNC: 106 MG/DL — HIGH (ref 70–99)
GLUCOSE BLDC GLUCOMTR-MCNC: 127 MG/DL — HIGH (ref 70–99)
GLUCOSE SERPL-MCNC: 113 MG/DL — HIGH (ref 70–99)
GLUCOSE SERPL-MCNC: 136 MG/DL — HIGH (ref 70–99)
GLUCOSE SERPL-MCNC: 99 MG/DL — SIGNIFICANT CHANGE UP (ref 70–99)
HCT VFR BLD CALC: 38.9 % — SIGNIFICANT CHANGE UP (ref 37–47)
HCT VFR BLD CALC: 40.2 % — SIGNIFICANT CHANGE UP (ref 37–47)
HCT VFR BLD CALC: 44.8 % — SIGNIFICANT CHANGE UP (ref 37–47)
HGB BLD-MCNC: 12.1 G/DL — SIGNIFICANT CHANGE UP (ref 12–16)
HGB BLD-MCNC: 12.4 G/DL — SIGNIFICANT CHANGE UP (ref 12–16)
HGB BLD-MCNC: 13.6 G/DL — SIGNIFICANT CHANGE UP (ref 12–16)
IMM GRANULOCYTES NFR BLD AUTO: 0.3 % — SIGNIFICANT CHANGE UP (ref 0.1–0.3)
IMM GRANULOCYTES NFR BLD AUTO: 0.3 % — SIGNIFICANT CHANGE UP (ref 0.1–0.3)
IMM GRANULOCYTES NFR BLD AUTO: 0.5 % — HIGH (ref 0.1–0.3)
INR BLD: 1.13 RATIO — SIGNIFICANT CHANGE UP (ref 0.65–1.3)
LACTATE SERPL-SCNC: 3.3 MMOL/L — HIGH (ref 0.7–2)
LYMPHOCYTES # BLD AUTO: 0.78 K/UL — LOW (ref 1.2–3.4)
LYMPHOCYTES # BLD AUTO: 0.87 K/UL — LOW (ref 1.2–3.4)
LYMPHOCYTES # BLD AUTO: 1.62 K/UL — SIGNIFICANT CHANGE UP (ref 1.2–3.4)
LYMPHOCYTES # BLD AUTO: 7.7 % — LOW (ref 20.5–51.1)
LYMPHOCYTES # BLD AUTO: 8.3 % — LOW (ref 20.5–51.1)
LYMPHOCYTES # BLD AUTO: 9.8 % — LOW (ref 20.5–51.1)
MAGNESIUM SERPL-MCNC: 1.6 MG/DL — LOW (ref 1.8–2.4)
MAGNESIUM SERPL-MCNC: 1.7 MG/DL — LOW (ref 1.8–2.4)
MAGNESIUM SERPL-MCNC: 2.2 MG/DL — SIGNIFICANT CHANGE UP (ref 1.8–2.4)
MCHC RBC-ENTMCNC: 26.9 PG — LOW (ref 27–31)
MCHC RBC-ENTMCNC: 27 PG — SIGNIFICANT CHANGE UP (ref 27–31)
MCHC RBC-ENTMCNC: 27.4 PG — SIGNIFICANT CHANGE UP (ref 27–31)
MCHC RBC-ENTMCNC: 30.4 G/DL — LOW (ref 32–37)
MCHC RBC-ENTMCNC: 30.8 G/DL — LOW (ref 32–37)
MCHC RBC-ENTMCNC: 31.1 G/DL — LOW (ref 32–37)
MCV RBC AUTO: 87.4 FL — SIGNIFICANT CHANGE UP (ref 81–99)
MCV RBC AUTO: 88 FL — SIGNIFICANT CHANGE UP (ref 81–99)
MCV RBC AUTO: 88.7 FL — SIGNIFICANT CHANGE UP (ref 81–99)
MONOCYTES # BLD AUTO: 0.44 K/UL — SIGNIFICANT CHANGE UP (ref 0.1–0.6)
MONOCYTES # BLD AUTO: 0.52 K/UL — SIGNIFICANT CHANGE UP (ref 0.1–0.6)
MONOCYTES # BLD AUTO: 0.63 K/UL — HIGH (ref 0.1–0.6)
MONOCYTES NFR BLD AUTO: 3 % — SIGNIFICANT CHANGE UP (ref 1.7–9.3)
MONOCYTES NFR BLD AUTO: 5 % — SIGNIFICANT CHANGE UP (ref 1.7–9.3)
MONOCYTES NFR BLD AUTO: 5.5 % — SIGNIFICANT CHANGE UP (ref 1.7–9.3)
NEUTROPHILS # BLD AUTO: 18.57 K/UL — HIGH (ref 1.4–6.5)
NEUTROPHILS # BLD AUTO: 6.68 K/UL — HIGH (ref 1.4–6.5)
NEUTROPHILS # BLD AUTO: 9 K/UL — HIGH (ref 1.4–6.5)
NEUTROPHILS NFR BLD AUTO: 84.1 % — HIGH (ref 42.2–75.2)
NEUTROPHILS NFR BLD AUTO: 86.1 % — HIGH (ref 42.2–75.2)
NEUTROPHILS NFR BLD AUTO: 88.6 % — HIGH (ref 42.2–75.2)
NRBC # BLD: 0 /100 WBCS — SIGNIFICANT CHANGE UP (ref 0–0)
PHOSPHATE SERPL-MCNC: 1.9 MG/DL — LOW (ref 2.1–4.9)
PHOSPHATE SERPL-MCNC: 2.1 MG/DL — SIGNIFICANT CHANGE UP (ref 2.1–4.9)
PHOSPHATE SERPL-MCNC: 5 MG/DL — HIGH (ref 2.1–4.9)
PLATELET # BLD AUTO: 310 K/UL — SIGNIFICANT CHANGE UP (ref 130–400)
PLATELET # BLD AUTO: 365 K/UL — SIGNIFICANT CHANGE UP (ref 130–400)
PLATELET # BLD AUTO: 376 K/UL — SIGNIFICANT CHANGE UP (ref 130–400)
PMV BLD: 10.9 FL — HIGH (ref 7.4–10.4)
PMV BLD: 11.4 FL — HIGH (ref 7.4–10.4)
PMV BLD: 11.5 FL — HIGH (ref 7.4–10.4)
POTASSIUM SERPL-MCNC: 4.1 MMOL/L — SIGNIFICANT CHANGE UP (ref 3.5–5)
POTASSIUM SERPL-MCNC: 4.4 MMOL/L — SIGNIFICANT CHANGE UP (ref 3.5–5)
POTASSIUM SERPL-MCNC: 4.5 MMOL/L — SIGNIFICANT CHANGE UP (ref 3.5–5)
POTASSIUM SERPL-SCNC: 4.1 MMOL/L — SIGNIFICANT CHANGE UP (ref 3.5–5)
POTASSIUM SERPL-SCNC: 4.4 MMOL/L — SIGNIFICANT CHANGE UP (ref 3.5–5)
POTASSIUM SERPL-SCNC: 4.5 MMOL/L — SIGNIFICANT CHANGE UP (ref 3.5–5)
PROTHROM AB SERPL-ACNC: 12.9 SEC — HIGH (ref 9.95–12.87)
RBC # BLD: 4.42 M/UL — SIGNIFICANT CHANGE UP (ref 4.2–5.4)
RBC # BLD: 4.6 M/UL — SIGNIFICANT CHANGE UP (ref 4.2–5.4)
RBC # BLD: 5.05 M/UL — SIGNIFICANT CHANGE UP (ref 4.2–5.4)
RBC # FLD: 15.3 % — HIGH (ref 11.5–14.5)
RBC # FLD: 15.3 % — HIGH (ref 11.5–14.5)
RBC # FLD: 15.4 % — HIGH (ref 11.5–14.5)
SODIUM SERPL-SCNC: 138 MMOL/L — SIGNIFICANT CHANGE UP (ref 135–146)
SODIUM SERPL-SCNC: 139 MMOL/L — SIGNIFICANT CHANGE UP (ref 135–146)
SODIUM SERPL-SCNC: 141 MMOL/L — SIGNIFICANT CHANGE UP (ref 135–146)
SODIUM UR-SCNC: <20 MMOL/L — SIGNIFICANT CHANGE UP
TROPONIN T, HIGH SENSITIVITY RESULT: 25 NG/L — HIGH (ref 6–13)
TROPONIN T, HIGH SENSITIVITY RESULT: 40 NG/L — HIGH (ref 6–13)
WBC # BLD: 10.45 K/UL — SIGNIFICANT CHANGE UP (ref 4.8–10.8)
WBC # BLD: 20.98 K/UL — HIGH (ref 4.8–10.8)
WBC # BLD: 7.94 K/UL — SIGNIFICANT CHANGE UP (ref 4.8–10.8)
WBC # FLD AUTO: 10.45 K/UL — SIGNIFICANT CHANGE UP (ref 4.8–10.8)
WBC # FLD AUTO: 20.98 K/UL — HIGH (ref 4.8–10.8)
WBC # FLD AUTO: 7.94 K/UL — SIGNIFICANT CHANGE UP (ref 4.8–10.8)

## 2024-05-01 PROCEDURE — 93010 ELECTROCARDIOGRAM REPORT: CPT

## 2024-05-01 PROCEDURE — 99291 CRITICAL CARE FIRST HOUR: CPT

## 2024-05-01 PROCEDURE — 36000 PLACE NEEDLE IN VEIN: CPT

## 2024-05-01 PROCEDURE — 71045 X-RAY EXAM CHEST 1 VIEW: CPT | Mod: 26

## 2024-05-01 RX ORDER — CALCIUM GLUCONATE 100 MG/ML
2 VIAL (ML) INTRAVENOUS ONCE
Refills: 0 | Status: DISCONTINUED | OUTPATIENT
Start: 2024-05-01 | End: 2024-05-01

## 2024-05-01 RX ORDER — ACETAMINOPHEN 500 MG
1000 TABLET ORAL ONCE
Refills: 0 | Status: DISCONTINUED | OUTPATIENT
Start: 2024-05-02 | End: 2024-05-02

## 2024-05-01 RX ORDER — LOSARTAN POTASSIUM 100 MG/1
50 TABLET, FILM COATED ORAL DAILY
Refills: 0 | Status: DISCONTINUED | OUTPATIENT
Start: 2024-05-01 | End: 2024-05-01

## 2024-05-01 RX ORDER — ALBUTEROL 90 UG/1
2 AEROSOL, METERED ORAL EVERY 6 HOURS
Refills: 0 | Status: DISCONTINUED | OUTPATIENT
Start: 2024-05-01 | End: 2024-05-15

## 2024-05-01 RX ORDER — ACETAMINOPHEN 500 MG
1000 TABLET ORAL ONCE
Refills: 0 | Status: DISCONTINUED | OUTPATIENT
Start: 2024-05-01 | End: 2024-05-01

## 2024-05-01 RX ORDER — CHLORHEXIDINE GLUCONATE 213 G/1000ML
1 SOLUTION TOPICAL
Refills: 0 | Status: DISCONTINUED | OUTPATIENT
Start: 2024-05-01 | End: 2024-05-15

## 2024-05-01 RX ORDER — HYOSCYAMINE SULFATE 0.13 MG
0.12 TABLET ORAL EVERY 4 HOURS
Refills: 0 | Status: DISCONTINUED | OUTPATIENT
Start: 2024-05-01 | End: 2024-05-05

## 2024-05-01 RX ORDER — HYDROMORPHONE HYDROCHLORIDE 2 MG/ML
1 INJECTION INTRAMUSCULAR; INTRAVENOUS; SUBCUTANEOUS
Refills: 0 | Status: DISCONTINUED | OUTPATIENT
Start: 2024-05-01 | End: 2024-05-01

## 2024-05-01 RX ORDER — SODIUM CHLORIDE 9 MG/ML
1000 INJECTION, SOLUTION INTRAVENOUS
Refills: 0 | Status: DISCONTINUED | OUTPATIENT
Start: 2024-05-01 | End: 2024-05-01

## 2024-05-01 RX ORDER — SODIUM CHLORIDE 9 MG/ML
1000 INJECTION, SOLUTION INTRAVENOUS
Refills: 0 | Status: DISCONTINUED | OUTPATIENT
Start: 2024-05-01 | End: 2024-05-02

## 2024-05-01 RX ORDER — GABAPENTIN 400 MG/1
100 CAPSULE ORAL EVERY 8 HOURS
Refills: 0 | Status: DISCONTINUED | OUTPATIENT
Start: 2024-05-01 | End: 2024-05-01

## 2024-05-01 RX ORDER — ACETAMINOPHEN 500 MG
1000 TABLET ORAL ONCE
Refills: 0 | Status: COMPLETED | OUTPATIENT
Start: 2024-05-01 | End: 2024-05-01

## 2024-05-01 RX ORDER — HYDROMORPHONE HYDROCHLORIDE 2 MG/ML
0.5 INJECTION INTRAMUSCULAR; INTRAVENOUS; SUBCUTANEOUS
Refills: 0 | Status: DISCONTINUED | OUTPATIENT
Start: 2024-05-01 | End: 2024-05-01

## 2024-05-01 RX ORDER — GABAPENTIN 400 MG/1
400 CAPSULE ORAL THREE TIMES A DAY
Refills: 0 | Status: DISCONTINUED | OUTPATIENT
Start: 2024-05-01 | End: 2024-05-01

## 2024-05-01 RX ORDER — AMITRIPTYLINE HCL 25 MG
10 TABLET ORAL AT BEDTIME
Refills: 0 | Status: DISCONTINUED | OUTPATIENT
Start: 2024-05-01 | End: 2024-05-05

## 2024-05-01 RX ORDER — HEPARIN SODIUM 5000 [USP'U]/ML
5000 INJECTION INTRAVENOUS; SUBCUTANEOUS EVERY 8 HOURS
Refills: 0 | Status: DISCONTINUED | OUTPATIENT
Start: 2024-05-01 | End: 2024-05-02

## 2024-05-01 RX ORDER — SPIRONOLACTONE 25 MG/1
50 TABLET, FILM COATED ORAL DAILY
Refills: 0 | Status: DISCONTINUED | OUTPATIENT
Start: 2024-05-01 | End: 2024-05-01

## 2024-05-01 RX ORDER — ENOXAPARIN SODIUM 100 MG/ML
40 INJECTION SUBCUTANEOUS EVERY 24 HOURS
Refills: 0 | Status: DISCONTINUED | OUTPATIENT
Start: 2024-05-01 | End: 2024-05-01

## 2024-05-01 RX ORDER — BUPROPION HYDROCHLORIDE 150 MG/1
150 TABLET, EXTENDED RELEASE ORAL DAILY
Refills: 0 | Status: DISCONTINUED | OUTPATIENT
Start: 2024-05-01 | End: 2024-05-05

## 2024-05-01 RX ORDER — SODIUM CHLORIDE 9 MG/ML
1000 INJECTION, SOLUTION INTRAVENOUS ONCE
Refills: 0 | Status: COMPLETED | OUTPATIENT
Start: 2024-05-01 | End: 2024-05-01

## 2024-05-01 RX ORDER — MAGNESIUM SULFATE 500 MG/ML
2 VIAL (ML) INJECTION
Refills: 0 | Status: COMPLETED | OUTPATIENT
Start: 2024-05-01 | End: 2024-05-01

## 2024-05-01 RX ORDER — SIMETHICONE 80 MG/1
80 TABLET, CHEWABLE ORAL
Refills: 0 | Status: DISCONTINUED | OUTPATIENT
Start: 2024-05-01 | End: 2024-05-05

## 2024-05-01 RX ORDER — CALCIUM GLUCONATE 100 MG/ML
1 VIAL (ML) INTRAVENOUS ONCE
Refills: 0 | Status: COMPLETED | OUTPATIENT
Start: 2024-05-01 | End: 2024-05-01

## 2024-05-01 RX ORDER — MORPHINE SULFATE 50 MG/1
4 CAPSULE, EXTENDED RELEASE ORAL EVERY 6 HOURS
Refills: 0 | Status: DISCONTINUED | OUTPATIENT
Start: 2024-05-01 | End: 2024-05-01

## 2024-05-01 RX ORDER — IPRATROPIUM/ALBUTEROL SULFATE 18-103MCG
3 AEROSOL WITH ADAPTER (GRAM) INHALATION EVERY 6 HOURS
Refills: 0 | Status: DISCONTINUED | OUTPATIENT
Start: 2024-05-01 | End: 2024-05-07

## 2024-05-01 RX ORDER — PIPERACILLIN AND TAZOBACTAM 4; .5 G/20ML; G/20ML
3.38 INJECTION, POWDER, LYOPHILIZED, FOR SOLUTION INTRAVENOUS EVERY 8 HOURS
Refills: 0 | Status: DISCONTINUED | OUTPATIENT
Start: 2024-05-01 | End: 2024-05-02

## 2024-05-01 RX ORDER — CEFOTETAN DISODIUM 1 G
2 VIAL (EA) INJECTION EVERY 12 HOURS
Refills: 0 | Status: DISCONTINUED | OUTPATIENT
Start: 2024-05-01 | End: 2024-05-01

## 2024-05-01 RX ORDER — HYDROMORPHONE HYDROCHLORIDE 2 MG/ML
0.5 INJECTION INTRAMUSCULAR; INTRAVENOUS; SUBCUTANEOUS EVERY 4 HOURS
Refills: 0 | Status: DISCONTINUED | OUTPATIENT
Start: 2024-05-01 | End: 2024-05-02

## 2024-05-01 RX ORDER — TRAMADOL HYDROCHLORIDE 50 MG/1
25 TABLET ORAL EVERY 8 HOURS
Refills: 0 | Status: DISCONTINUED | OUTPATIENT
Start: 2024-05-01 | End: 2024-05-01

## 2024-05-01 RX ORDER — NOREPINEPHRINE BITARTRATE/D5W 8 MG/250ML
0.05 PLASTIC BAG, INJECTION (ML) INTRAVENOUS
Qty: 8 | Refills: 0 | Status: DISCONTINUED | OUTPATIENT
Start: 2024-05-01 | End: 2024-05-01

## 2024-05-01 RX ORDER — MEPERIDINE HYDROCHLORIDE 50 MG/ML
12.5 INJECTION INTRAMUSCULAR; INTRAVENOUS; SUBCUTANEOUS
Refills: 0 | Status: DISCONTINUED | OUTPATIENT
Start: 2024-05-01 | End: 2024-05-01

## 2024-05-01 RX ORDER — ACETAMINOPHEN 500 MG
1000 TABLET ORAL ONCE
Refills: 0 | Status: COMPLETED | OUTPATIENT
Start: 2024-05-02 | End: 2024-05-02

## 2024-05-01 RX ORDER — ONDANSETRON 8 MG/1
4 TABLET, FILM COATED ORAL EVERY 6 HOURS
Refills: 0 | Status: DISCONTINUED | OUTPATIENT
Start: 2024-05-01 | End: 2024-05-04

## 2024-05-01 RX ORDER — METHOCARBAMOL 500 MG/1
500 TABLET, FILM COATED ORAL EVERY 6 HOURS
Refills: 0 | Status: DISCONTINUED | OUTPATIENT
Start: 2024-05-01 | End: 2024-05-01

## 2024-05-01 RX ORDER — OXYCODONE HYDROCHLORIDE 5 MG/1
7.5 TABLET ORAL EVERY 6 HOURS
Refills: 0 | Status: DISCONTINUED | OUTPATIENT
Start: 2024-05-01 | End: 2024-05-01

## 2024-05-01 RX ORDER — ONDANSETRON 8 MG/1
4 TABLET, FILM COATED ORAL ONCE
Refills: 0 | Status: DISCONTINUED | OUTPATIENT
Start: 2024-05-01 | End: 2024-05-01

## 2024-05-01 RX ORDER — PHENYLEPHRINE HYDROCHLORIDE 10 MG/ML
0.1 INJECTION INTRAVENOUS
Qty: 40 | Refills: 0 | Status: DISCONTINUED | OUTPATIENT
Start: 2024-05-01 | End: 2024-05-02

## 2024-05-01 RX ORDER — PANTOPRAZOLE SODIUM 20 MG/1
40 TABLET, DELAYED RELEASE ORAL EVERY 24 HOURS
Refills: 0 | Status: DISCONTINUED | OUTPATIENT
Start: 2024-05-01 | End: 2024-05-02

## 2024-05-01 RX ORDER — DULOXETINE HYDROCHLORIDE 30 MG/1
60 CAPSULE, DELAYED RELEASE ORAL DAILY
Refills: 0 | Status: DISCONTINUED | OUTPATIENT
Start: 2024-05-01 | End: 2024-05-05

## 2024-05-01 RX ORDER — ACETAMINOPHEN 500 MG
975 TABLET ORAL EVERY 6 HOURS
Refills: 0 | Status: DISCONTINUED | OUTPATIENT
Start: 2024-05-01 | End: 2024-05-01

## 2024-05-01 RX ORDER — GABAPENTIN 400 MG/1
100 CAPSULE ORAL EVERY 8 HOURS
Refills: 0 | Status: DISCONTINUED | OUTPATIENT
Start: 2024-05-01 | End: 2024-05-02

## 2024-05-01 RX ORDER — KETOROLAC TROMETHAMINE 30 MG/ML
15 SYRINGE (ML) INJECTION EVERY 6 HOURS
Refills: 0 | Status: DISCONTINUED | OUTPATIENT
Start: 2024-05-01 | End: 2024-05-01

## 2024-05-01 RX ORDER — SODIUM CHLORIDE 9 MG/ML
250 INJECTION, SOLUTION INTRAVENOUS ONCE
Refills: 0 | Status: COMPLETED | OUTPATIENT
Start: 2024-05-01 | End: 2024-05-01

## 2024-05-01 RX ADMIN — PIPERACILLIN AND TAZOBACTAM 25 GRAM(S): 4; .5 INJECTION, POWDER, LYOPHILIZED, FOR SOLUTION INTRAVENOUS at 18:24

## 2024-05-01 RX ADMIN — Medication 10 MILLIGRAM(S): at 22:12

## 2024-05-01 RX ADMIN — Medication 400 MILLIGRAM(S): at 22:12

## 2024-05-01 RX ADMIN — HEPARIN SODIUM 5000 UNIT(S): 5000 INJECTION INTRAVENOUS; SUBCUTANEOUS at 22:12

## 2024-05-01 RX ADMIN — BUPROPION HYDROCHLORIDE 150 MILLIGRAM(S): 150 TABLET, EXTENDED RELEASE ORAL at 14:04

## 2024-05-01 RX ADMIN — DULOXETINE HYDROCHLORIDE 60 MILLIGRAM(S): 30 CAPSULE, DELAYED RELEASE ORAL at 14:03

## 2024-05-01 RX ADMIN — Medication 100 GRAM(S): at 06:57

## 2024-05-01 RX ADMIN — SODIUM CHLORIDE 1000 MILLILITER(S): 9 INJECTION, SOLUTION INTRAVENOUS at 08:09

## 2024-05-01 RX ADMIN — Medication 100 GRAM(S): at 09:27

## 2024-05-01 RX ADMIN — Medication 1000 MILLIGRAM(S): at 22:42

## 2024-05-01 RX ADMIN — Medication 400 MILLIGRAM(S): at 06:57

## 2024-05-01 RX ADMIN — PHENYLEPHRINE HYDROCHLORIDE 4.2 MICROGRAM(S)/KG/MIN: 10 INJECTION INTRAVENOUS at 14:02

## 2024-05-01 RX ADMIN — GABAPENTIN 100 MILLIGRAM(S): 400 CAPSULE ORAL at 14:03

## 2024-05-01 RX ADMIN — Medication 85 MILLIMOLE(S): at 11:27

## 2024-05-01 RX ADMIN — Medication 400 MILLIGRAM(S): at 00:57

## 2024-05-01 RX ADMIN — GABAPENTIN 100 MILLIGRAM(S): 400 CAPSULE ORAL at 22:12

## 2024-05-01 RX ADMIN — SIMETHICONE 80 MILLIGRAM(S): 80 TABLET, CHEWABLE ORAL at 18:24

## 2024-05-01 RX ADMIN — HYDROMORPHONE HYDROCHLORIDE 1 MILLIGRAM(S): 2 INJECTION INTRAMUSCULAR; INTRAVENOUS; SUBCUTANEOUS at 01:09

## 2024-05-01 RX ADMIN — HEPARIN SODIUM 5000 UNIT(S): 5000 INJECTION INTRAVENOUS; SUBCUTANEOUS at 14:04

## 2024-05-01 RX ADMIN — Medication 1.25 MILLIGRAM(S): at 06:57

## 2024-05-01 RX ADMIN — METHOCARBAMOL 500 MILLIGRAM(S): 500 TABLET, FILM COATED ORAL at 14:03

## 2024-05-01 RX ADMIN — SODIUM CHLORIDE 125 MILLILITER(S): 9 INJECTION, SOLUTION INTRAVENOUS at 01:10

## 2024-05-01 RX ADMIN — Medication 25 GRAM(S): at 14:03

## 2024-05-01 RX ADMIN — HYDROMORPHONE HYDROCHLORIDE 1 MILLIGRAM(S): 2 INJECTION INTRAMUSCULAR; INTRAVENOUS; SUBCUTANEOUS at 01:39

## 2024-05-01 RX ADMIN — SODIUM CHLORIDE 3000 MILLILITER(S): 9 INJECTION, SOLUTION INTRAVENOUS at 23:52

## 2024-05-01 RX ADMIN — PANTOPRAZOLE SODIUM 40 MILLIGRAM(S): 20 TABLET, DELAYED RELEASE ORAL at 14:03

## 2024-05-01 RX ADMIN — Medication 15 MILLIGRAM(S): at 02:43

## 2024-05-01 RX ADMIN — Medication 1000 MILLIGRAM(S): at 18:47

## 2024-05-01 RX ADMIN — SODIUM CHLORIDE 150 MILLILITER(S): 9 INJECTION, SOLUTION INTRAVENOUS at 18:43

## 2024-05-01 RX ADMIN — Medication 25 GRAM(S): at 15:23

## 2024-05-01 RX ADMIN — PIPERACILLIN AND TAZOBACTAM 25 GRAM(S): 4; .5 INJECTION, POWDER, LYOPHILIZED, FOR SOLUTION INTRAVENOUS at 22:10

## 2024-05-01 RX ADMIN — Medication 400 MILLIGRAM(S): at 18:23

## 2024-05-01 RX ADMIN — Medication 3 MILLILITER(S): at 21:17

## 2024-05-01 RX ADMIN — SODIUM CHLORIDE 150 MILLILITER(S): 9 INJECTION, SOLUTION INTRAVENOUS at 11:27

## 2024-05-01 RX ADMIN — SIMETHICONE 80 MILLIGRAM(S): 80 TABLET, CHEWABLE ORAL at 14:04

## 2024-05-01 NOTE — BRIEF OPERATIVE NOTE - OPERATION/FINDINGS
Narrowed jejunojejunal anastomosis, released and revised  Negative leak test
Moderate sliding hiatal hernia  Intact and hemostatic GJ anastomosis  Negative intraop leak test

## 2024-05-01 NOTE — PROGRESS NOTE ADULT - SUBJECTIVE AND OBJECTIVE BOX
GENERAL SURGERY PROGRESS NOTE     SARAH BERGMAN  [address] [place] [address] day :2d    POD:  Procedure: Creation, bypass, gastric, laparoscopic, robot-assisted    Robot-assisted hiatal herniorrhaphy using da Madhuri Xi    Creation, bypass, gastric, laparoscopic, robot-assisted    Diagnostic laparoscopy    Revision of anastomosis of small intestine    Laparoscopic lysis of abdominal adhesions    Upper endoscopy    Release of small bowel obstruction      Surgical Attending: Enedelia Montoya  Overnight events:    multiple episodes of nausea/emesis yesterday, given compazine with temporary relief. c/o abdominal pain. repeat CT A/P showed narrowing at her JJ anastomosis. went back to OR for diagnostic lap with JJ anastomosis revision, JASON, and upper endoscopic release of SBO.     on her post op check this morning at 630 am she was doing ok, minor pain.     around 830-9am, She Was found to be hypotensive to systolic 60-70s and tachycardic to 110s. She was AOx3 but having some visual hallucinations, feeling lightheaded, and had a dry cough. Her BP prior had been 96-180s. Afebrile.     Abd was soft, non peritonitic and was tender to palpation around incisions but were clean, dry, and intact.     She was given a 1 LR bolus and raised her legs for BP support. her IV in the right AC was non functioning so she had not received IV fluids since her surgery. BP not initially responsive due to IV placement issues. A rapid was called due to hemodynamic instability and her mentation was declining as she became less alert. A RUE Midline was placed and additional peripheral IV was placed to fluid resuscitate. Stat labs were drawn with elevated lactate to 3, troponins and CKMB elevation, Cr 1.5 from 1.2. Likely from demand ischemia from being fluid behind. EKG with sinus tachycardia. a CXR showed bilateral infiltrates suspected for aspiration pneumonitis due to her abdominal surgery. Patient was stabilized and SICU consulted for hemodynamic support. levophed ordered but not given. After fluids were able to run, her BP came up to 90s/50s. She was transferred to SICU for further monitoring where a second midline was placed.       T(F): 98.5 (05-01-24 @ 03:05), Max: 98.5 (05-01-24 @ 03:05)  HR: 90 (05-01-24 @ 03:05) (78 - 100)  BP: 124/78 (05-01-24 @ 03:05) (96/52 - 185/94)  ABP: --  ABP(mean): --  RR: 20 (05-01-24 @ 02:15) (14 - 23)  SpO2: 93% (05-01-24 @ 02:15) (91% - 100%)    IN'S / OUT's:    04-30-24 @ 07:01  -  05-01-24 @ 07:00  --------------------------------------------------------  IN:    IV PiggyBack: 200 mL    Lactated Ringers: 1762.5 mL    Lactated Ringers: 150 mL    Oral Fluid: 410 mL  Total IN: 2522.5 mL    OUT:    Bulb (mL): 125 mL    Voided (mL): 900 mL  Total OUT: 1025 mL    Total NET: 1497.5 mL          PHYSICAL EXAM:  GENERAL: AOx3, looks uncomfortable   CHEST/LUNG: Clear to auscultation bilaterally on RA   HEART: tachycardic and hypotensive   ABDOMEN: Soft, moderately tender around incisions, mild-mod distended; no rebound or guarding. incisions c/d/i.   EXTREMITIES:  No clubbing, cyanosis, or edema        LABS  Labs:  CAPILLARY BLOOD GLUCOSE      POCT Blood Glucose.: 106 mg/dL (01 May 2024 09:11)  POCT Blood Glucose.: 101 mg/dL (01 May 2024 06:20)  POCT Blood Glucose.: 93 mg/dL (30 Apr 2024 22:36)  POCT Blood Glucose.: 108 mg/dL (30 Apr 2024 18:15)  POCT Blood Glucose.: 165 mg/dL (30 Apr 2024 12:09)                          12.4   10.45 )-----------( 310      ( 01 May 2024 09:01 )             40.2       Auto Neutrophil %: 86.1 % (05-01-24 @ 09:01)  Auto Immature Granulocyte %: 0.3 % (05-01-24 @ 09:01)  Auto Neutrophil %: 84.1 % (05-01-24 @ 06:32)  Auto Immature Granulocyte %: 0.3 % (05-01-24 @ 06:32)    05-01    139  |  103  |  15  ----------------------------<  113<H>  4.1   |  25  |  1.5      Calcium: 8.4 mg/dL (05-01-24 @ 09:01)      LFTs:     Lactate, Blood: 3.3 mmol/L (05-01-24 @ 09:01)  Blood Gas Arterial, Lactate: 3.0 mmol/L (05-01-24 @ 08:58)    ABG - ( 01 May 2024 08:58 )  pH: 7.40  /  pCO2: 41    /  pO2: 67    / HCO3: 25    / Base Excess: 0.5   /  SaO2: 95.6              Coags:     12.90  ----< 1.13    ( 01 May 2024 09:01 )     28.7        CARDIAC MARKERS ( 01 May 2024 09:01 )  x     / x     / 275 U/L / x     / 4.7 ng/mL          Urinalysis Basic - ( 01 May 2024 09:01 )    Color: x / Appearance: x / SG: x / pH: x  Gluc: 113 mg/dL / Ketone: x  / Bili: x / Urobili: x   Blood: x / Protein: x / Nitrite: x   Leuk Esterase: x / RBC: x / WBC x   Sq Epi: x / Non Sq Epi: x / Bacteria: x            RADIOLOGY & ADDITIONAL TESTS:      A/P:  SARAH BERGMAN is a 58F PMHX obesity (BMI 43), GERD, RAFA (CPAP), HTN, ASTHMA admitted for elective gastric bypass and hiatal hernia repair, POD 2, Hosp day 3. Now is POD 0 for RTOR for diagnostic lap with JJ anastomosis revision, JASON, and upper endoscopic release of SBO.      PLAN:   start IV antibiotics for pneumonia coverage- 2g Cefotetan IV BID    c/w IVF for resuscitation and resolution of KIKA Cr 1.5 from 1.2   pain control   NPO sips and chips- ADAT   monitor UOP   hemodynamic monitoring   trend troponins- elevated to 25. likely from demand ischemia. will c/w fluid resuscitation  trend lactate- likely demand ischemia from being fluid behind.   DVT/GI ppx   Transferred Care per SICU mgmt- if condition improves and patient more stable, can possible downgrade tomorrow.     Disposition:  ***    Above plan to be discussed with Attending Surgeon Dr. Jan Frank Spectra [id number]   GENERAL SURGERY PROGRESS NOTE     SARAH BERGMAN  [address] [place] [address] day :2d    POD:  Procedure: Creation, bypass, gastric, laparoscopic, robot-assisted    Robot-assisted hiatal herniorrhaphy using da Madhuri Xi    Creation, bypass, gastric, laparoscopic, robot-assisted    Diagnostic laparoscopy    Revision of anastomosis of small intestine    Laparoscopic lysis of abdominal adhesions    Upper endoscopy    Release of small bowel obstruction      Surgical Attending: Enedelia Montoya  Overnight events:    multiple episodes of nausea/emesis yesterday, given compazine with temporary relief. c/o abdominal pain. repeat CT A/P showed narrowing at her JJ anastomosis. went back to OR for diagnostic lap with JJ anastomosis revision, JASON, and upper endoscopic release of SBO.     on her post op check this morning at 630 am she was doing ok, minor pain.     around 830-9am, She Was found to be hypotensive to systolic 60-70s and tachycardic to 110s. She was AOx3 but having some visual hallucinations, feeling lightheaded, and had a dry cough. Her BP prior had been 96-180s. Afebrile.     Abd was soft, non peritonitic and was tender to palpation around incisions but were clean, dry, and intact.     She was given a 1 LR bolus and raised her legs for BP support. her IV in the right AC was non functioning so she had not received IV fluids. BP not initially responsive due to IV placement issues. A rapid was called due to hemodynamic instability and her mentation was declining as she became less alert. A RUE Midline was placed and additional peripheral IV was placed to fluid resuscitate. Stat labs were drawn with elevated lactate to 3, troponins and CKMB elevation, Cr 1.5 from 1.2. Likely from demand ischemia from being fluid behind. EKG with sinus tachycardia. a CXR showed bilateral infiltrates suspected for aspiration pneumonitis due to her abdominal surgery. Patient was stabilized and SICU consulted for hemodynamic support. levophed ordered but not given. After fluids were able to run, her BP came up to 90s/50s. She was transferred to SICU for further monitoring where a second midline was placed.       T(F): 98.5 (05-01-24 @ 03:05), Max: 98.5 (05-01-24 @ 03:05)  HR: 90 (05-01-24 @ 03:05) (78 - 100)  BP: 124/78 (05-01-24 @ 03:05) (96/52 - 185/94)  ABP: --  ABP(mean): --  RR: 20 (05-01-24 @ 02:15) (14 - 23)  SpO2: 93% (05-01-24 @ 02:15) (91% - 100%)    IN'S / OUT's:    04-30-24 @ 07:01  -  05-01-24 @ 07:00  --------------------------------------------------------  IN:    IV PiggyBack: 200 mL    Lactated Ringers: 1762.5 mL    Lactated Ringers: 150 mL    Oral Fluid: 410 mL  Total IN: 2522.5 mL    OUT:    Bulb (mL): 125 mL    Voided (mL): 900 mL  Total OUT: 1025 mL    Total NET: 1497.5 mL          PHYSICAL EXAM:  GENERAL: AOx3, looks uncomfortable   CHEST/LUNG: Clear to auscultation bilaterally on RA   HEART: tachycardic and hypotensive   ABDOMEN: Soft, moderately tender around incisions, mild-mod distended; no rebound or guarding. incisions c/d/i.   EXTREMITIES:  No clubbing, cyanosis, or edema        LABS  Labs:  CAPILLARY BLOOD GLUCOSE      POCT Blood Glucose.: 106 mg/dL (01 May 2024 09:11)  POCT Blood Glucose.: 101 mg/dL (01 May 2024 06:20)  POCT Blood Glucose.: 93 mg/dL (30 Apr 2024 22:36)  POCT Blood Glucose.: 108 mg/dL (30 Apr 2024 18:15)  POCT Blood Glucose.: 165 mg/dL (30 Apr 2024 12:09)                          12.4   10.45 )-----------( 310      ( 01 May 2024 09:01 )             40.2       Auto Neutrophil %: 86.1 % (05-01-24 @ 09:01)  Auto Immature Granulocyte %: 0.3 % (05-01-24 @ 09:01)  Auto Neutrophil %: 84.1 % (05-01-24 @ 06:32)  Auto Immature Granulocyte %: 0.3 % (05-01-24 @ 06:32)    05-01    139  |  103  |  15  ----------------------------<  113<H>  4.1   |  25  |  1.5      Calcium: 8.4 mg/dL (05-01-24 @ 09:01)      LFTs:     Lactate, Blood: 3.3 mmol/L (05-01-24 @ 09:01)  Blood Gas Arterial, Lactate: 3.0 mmol/L (05-01-24 @ 08:58)    ABG - ( 01 May 2024 08:58 )  pH: 7.40  /  pCO2: 41    /  pO2: 67    / HCO3: 25    / Base Excess: 0.5   /  SaO2: 95.6              Coags:     12.90  ----< 1.13    ( 01 May 2024 09:01 )     28.7        CARDIAC MARKERS ( 01 May 2024 09:01 )  x     / x     / 275 U/L / x     / 4.7 ng/mL          Urinalysis Basic - ( 01 May 2024 09:01 )    Color: x / Appearance: x / SG: x / pH: x  Gluc: 113 mg/dL / Ketone: x  / Bili: x / Urobili: x   Blood: x / Protein: x / Nitrite: x   Leuk Esterase: x / RBC: x / WBC x   Sq Epi: x / Non Sq Epi: x / Bacteria: x            RADIOLOGY & ADDITIONAL TESTS:      A/P:  SARAH BERGMAN is a 58F PMHX obesity (BMI 43), GERD, RAFA (CPAP), HTN, ASTHMA admitted for elective gastric bypass and hiatal hernia repair, POD 2, Hosp day 3. Now is POD 0 for RTOR for diagnostic lap with JJ anastomosis revision, JASON, and upper endoscopic release of SBO.      PLAN:   start IV antibiotics for pneumonia coverage- 2g Cefotetan IV BID    c/w IVF for resuscitation and resolution of KIKA Cr 1.5 from 1.2   pain control   NPO sips and chips- ADAT   monitor UOP   hemodynamic monitoring   trend troponins- elevated to 25. likely from demand ischemia. will c/w fluid resuscitation  trend lactate- likely demand ischemia from being fluid behind.   DVT/GI ppx   Transferred Care per SICU mgmt- if condition improves and patient more stable, can possible downgrade tomorrow.     Disposition:  ***    Above plan to be discussed with Attending Surgeon Dr. Jan Frank Spectra [id number]

## 2024-05-01 NOTE — CHART NOTE - NSCHARTNOTEFT_GEN_A_CORE
PACU ANESTHESIA ADMISSION NOTE      Procedure: Diagnostic laparoscopy, Revision of anastomosis of small intestine, Laparoscopic lysis of abdominal adhesions, Upper endoscopy, Release of small bowel obstruction    Post op diagnosis:  Small bowel obstruction    __x__  Patent Airway    __x__  Full return of protective reflexes    _x___  Full recovery from anesthesia / back to baseline status    Vitals:  T(C): 97.8 F  HR: 104  BP: 134/64  RR: 16  SpO2: 95%    Mental Status:  _x___ Awake   __x___ Alert   _____ Drowsy   _____ Sedated    Nausea/Vomiting:  _x___ NO  ______Yes,   See Post - Op Orders          Pain Scale (0-10):  _____    Treatment: ____ None    __x__ See Post - Op/PCA Orders    Post - Operative Fluids:   ____ Oral   __x__ See Post - Op Orders    Plan: Discharge:   ____Home       _x____Floor     _____Critical Care    _____  Other:_________________    Comments: Patient brought into the OR nauseous and actively vomiting, unable to place NG tube as patient is post op day 1 from gastric bypass, as such, RSI performed but patient had gastric contents in oral cavity upon intubation, possible aspiration, surgeon aware as she was present for intubation. VItals stable. Pt transferred to PACU. Transfer to floor when criteria is met

## 2024-05-01 NOTE — CONSULT NOTE ADULT - ASSESSMENT
58y Female s/p robotic-assisted hiatal herniorraphy w/ gastric bypass (4/29), diagnostic laparoscopy with released and revised JJ anastomosis narrowing w/ negative leak test.    NEUROLOGICAL:  #acute pain  - tylenol 975mg q8 hours  - gabapentin 100mg q8 hours  - robaxin 500mg q6 hours  - tramadol 25mg q8 hours PRN  - dilaudid 0.5mg q4 hours PRN  #Depression  - continue home duloxetine  - continue home wellbutrin    RESPIRATORY:   #aspiration pneumonia with hypoxic respiratory failure  - On HFNC 40L/60%  - wean oxygen as tolerated      CARDIOVASCULAR:   #  Imaging:   Labs:   losartan 50 mg oral tablet: 1 tab(s) orally once a day (at bedtime)  spironolactone 50 mg oral tablet: 1 tab(s) orally once a day      GASTROINTESTINAL/NUTRITION:   #Diet, NPO    Diet, NPO:   Except Medications [Active]         -if NG tube in place, document nare length and order q4 adherance    -aspiration precautions, HOB 30  #GI Prophylaxis    pantoprazole  Injectable 40 IV Push every 24 hours      -not indicated  #Bowel regimen    -senna/miralax if indicated    -last bowel movement    famotidine 40 mg oral tablet: 1 tab(s) orally once a day  pantoprazole 20 mg oral delayed release tablet: 1 tab(s) orally once a day (at bedtime)      /RENAL:   #urine output in critically ill    -indwelling bowden (placed     Labs          Electrolytes-(05-01 @ 09:01)Na 139 // K 4.1 // Mg 1.6 // Phos 2.1          HEME/ONC:   #DVT prophylaxis     -Chemical: heparin   Injectable 5000 Unit(s) SubCutaneous every 8 hours     -Mechanical: SCDs    -if DVT prophylaxis to be held, document and place VTE order        Labs: Hb/Hct:  13.6/44.8  (05-01 @ 06:32)  -->,  12.4/40.2  (05-01 @ 09:01)  -->                      Plts:  365  -->,  310  -->                 PTT/INR:  28.7/1.13  --->          T&S Expires:   Blood Consent-obtain if acute anemia, q6 CBC    ID:  #  #Antibiotics  #DTI?  WBC- 16.25  --->>,  7.94  --->>,  10.45  --->>  Temp trend- 24hrs T(F): 98 (05-01 @ 12:00), Max: 98.7 (05-01 @ 09:30)  Current antibiotics-cefoTEtan  IVPB 2 every 12 hours    Cultures:     ENDOCRINOLOGY:    -FSG q6 if NPO or Tube feeds    -Glucose goal 140-180. if above 180 start ISS    MSK:     Activity - Increase As Tolerated:     Time/Priority:  Routine (05-01-24 @ 10:11)      LINES/DRAINS:  PIV, Bowden    ADVANCED DIRECTIVES:  Full Code    HCP/Emergency Contact-    INDICATION FOR SICU/SDU: Morbid or severe obesity due to excess calories      Creation, bypass, gastric, laparoscopic, robot-assisted             DISPO:   Case discussed with SICU attending   58y Female s/p robotic-assisted hiatal herniorraphy w/ gastric bypass (), diagnostic laparoscopy with released and revised JJ anastomosis narrowing w/ negative leak test.    NEUROLOGICAL:  #acute pain  - tylenol 975mg q8 hours  - gabapentin 100mg q8 hours  - robaxin 500mg q6 hours  - tramadol 25mg q8 hours PRN  - dilaudid 0.5mg q4 hours PRN  #Depression  - continue home duloxetine  - continue home wellbutrin    RESPIRATORY:   #aspiration pneumonia with hypoxic respiratory failure  - On HFNC 40L/60%  - wean oxygen as tolerated  - AB.40/41/67/25  Lac 3.0  - chest xray: bilateral pulmonary opacities    CARDIOVASCULAR:   #  Imaging:   Labs:   losartan 50 mg oral tablet: 1 tab(s) orally once a day (at bedtime)  spironolactone 50 mg oral tablet: 1 tab(s) orally once a day      GASTROINTESTINAL/NUTRITION:   #Diet, NPO    Diet, NPO:   Except Medications [Active]         -if NG tube in place, document nare length and order q4 adherance    -aspiration precautions, HOB 30  #GI Prophylaxis    pantoprazole  Injectable 40 IV Push every 24 hours      -not indicated  #Bowel regimen    -senna/miralax if indicated    -last bowel movement    famotidine 40 mg oral tablet: 1 tab(s) orally once a day  pantoprazole 20 mg oral delayed release tablet: 1 tab(s) orally once a day (at bedtime)      /RENAL:   #urine output in critically ill    -indwelling bowden (placed     Labs          Electrolytes-( @ 09:01)Na 139 // K 4.1 // Mg 1.6 // Phos 2.1          HEME/ONC:   #DVT prophylaxis     -Chemical: heparin   Injectable 5000 Unit(s) SubCutaneous every 8 hours     -Mechanical: SCDs    -if DVT prophylaxis to be held, document and place VTE order        Labs: Hb/Hct:  13.6/44.8  ( @ 06:32)  -->,  12.4/40.2  ( @ 09:01)  -->                      Plts:  365  -->,  310  -->                 PTT/INR:  28.7/1.13  --->          T&S Expires:   Blood Consent-obtain if acute anemia, q6 CBC    ID:  #  #Antibiotics  #DTI?  WBC- 16.25  --->>,  7.94  --->>,  10.45  --->>  Temp trend- 24hrs T(F): 98 ( @ 12:00), Max: 98.7 ( @ 09:30)  Current antibiotics-cefoTEtan  IVPB 2 every 12 hours    Cultures:     ENDOCRINOLOGY:    -FSG q6 if NPO or Tube feeds    -Glucose goal 140-180. if above 180 start ISS    MSK:     Activity - Increase As Tolerated:     Time/Priority:  Routine (24 @ 10:11)      LINES/DRAINS:  PIV, Bowden    ADVANCED DIRECTIVES:  Full Code    HCP/Emergency Contact-    INDICATION FOR SICU/SDU: Morbid or severe obesity due to excess calories      Creation, bypass, gastric, laparoscopic, robot-assisted             DISPO:   Case discussed with SICU attending   58y Female s/p robotic-assisted hiatal herniorraphy w/ gastric bypass (), diagnostic laparoscopy with released and revised JJ anastomosis narrowing w/ negative leak test.    NEUROLOGICAL:  #acute pain  - IV tylenol ATC  - gabapentin 100mg q8 hours solution  - hyoscyamine SL  - dilaudid 0.5mg q4 hours PRN  #Depression  - continue home duloxetine  - continue home wellbutrin    RESPIRATORY:   #aspiration pneumonia with hypoxic respiratory failure  - On HFNC 40L/60%  - wean oxygen as tolerated  - AB.40/41/67/25  Lac 3.0  - chest xray: bilateral pulmonary opacities    CARDIOVASCULAR:   #Hypotension  - likely secondary to dehydration/ enalapril in AM  - if not resolving, then consider sepsis  - on phenylephrine infusion  #HTN  - HOLDING home antihypertensives (losartan, spironolactone)  Elevated troponemia (25 -->)  - repeat  - likely 2/2 hypotension  - will continue to trend  - EKG sinus tachycardia  - no current concern for ACS     GASTROINTESTINAL/NUTRITION:   #Diet, NPO, except meds  - AVOID tablets/ capsules  #GI Prophylaxis  - protonix  #Bowel regimen  - none     /RENAL:   #urine output in critically ill    -voiding  #KIKA  - f/u urine lytes     Labs:          BUN/Cr- 14/1.2  -->,  15/1.5  -->          Electrolytes-( @ 09:01)Na 139 // K 4.1 // Mg 1.6 // Phos 2.1 (magnesium and phosphate repleted)  #IV fluids  - LR @ 200 cc/hr  #elevated lactate  - 3 -->  - continue to trend       HEME/ONC:   #DVT prophylaxis  - switched to heparin subQ (avoid lovenox due to KIKA)  - SCDs       Labs: Hb/Hct:  13.6/44.8  ( @ 06:32)  -->,  12.4/40.2  ( @ 09:01)  -->                      Plts:  365  -->,  310  -->                 PTT/INR:  28.7/1.13  --->     T&S Expires:     ID:  #Antibiotics  - periop cefotetan  - will consider broadening abx  WBC- 16.25  --->>,  7.94  --->>,  10.45  --->>  Temp trend- 24hrs T(F): 98 ( @ 12:00), Max: 98.7 ( @ 09:30)  Current antibiotics- cefoTEtan  IVPB 2 every 12 hours    ENDOCRINOLOGY:  - POCT fingersticks q6 hours while NPO  - Glucose goal 140-180    MSK:     Activity - Increase As Tolerated:     Time/Priority:  Routine (24 @ 10:11)      LINES/DRAINS:  PIVs, bilateral cephalic midlines ()    ADVANCED DIRECTIVES:  Full Code    HCP/Emergency Contact-    INDICATION FOR SICU/SDU: Morbid or severe obesity due to excess calories      Creation, bypass, gastric, laparoscopic, robot-assisted             DISPO:   SICU. Case discussed with SICU attending Dr. Burnett. 58y Female s/p robotic-assisted hiatal herniorraphy w/ gastric bypass (), diagnostic laparoscopy with released and revised JJ anastomosis narrowing w/ negative leak test ().    NEUROLOGICAL:  #acute pain  - IV tylenol ATC  - gabapentin 100mg q8 hours solution  - hyoscyamine SL  - dilaudid 0.5mg q4 hours PRN  #Depression  - continue home duloxetine  - continue home wellbutrin    RESPIRATORY:   #aspiration pneumonia with hypoxic respiratory failure  - On HFNC 40L/60%  - wean oxygen as tolerated  - AB.40/41/67/25  Lac 3.0  - chest xray: bilateral pulmonary opacities    CARDIOVASCULAR:   #Hypotension  - likely secondary to dehydration/ enalapril in AM  - if not resolving, then consider sepsis  - on phenylephrine infusion  #HTN  - HOLDING home antihypertensives (losartan, spironolactone)  Elevated troponemia (25 -->)  - repeat  - likely 2/2 hypotension  - will continue to trend  - EKG sinus tachycardia  - no current concern for ACS     GASTROINTESTINAL/NUTRITION:   #s/p robotic-assisted hiatal herniorraphy w/ gastric bypass (), diagnostic laparoscopy with released and revised JJ anastomosis narrowing w/ negative leak test ()  - LLQ CECE drain with serosanguinous output  #Diet, NPO, except meds  - AVOID tablets/ capsules  #GI Prophylaxis  - protonix  #Bowel regimen  - none     /RENAL:   #urine output in critically ill    -voiding  #KIKA  - f/u urine lytes     Labs:          BUN/Cr- 14/1.2  -->,  15/1.5  -->          Electrolytes-( @ 09:01)Na 139 // K 4.1 // Mg 1.6 // Phos 2.1 (magnesium and phosphate repleted)  #IV fluids  - LR @ 200 cc/hr  #elevated lactate  - 3 -->  - continue to trend       HEME/ONC:   #DVT prophylaxis  - switched to heparin subQ (avoid lovenox due to KIKA)  - SCDs       Labs: Hb/Hct:  13.6/44.8  ( @ 06:32)  -->,  12.4/40.2  ( @ 09:01)  -->                      Plts:  365  -->,  310  -->                 PTT/INR:  28.7/1.13  --->     T&S Expires:     ID:  #Antibiotics  - periop cefotetan  - will consider broadening abx  WBC- 16.25  --->>,  7.94  --->>,  10.45  --->>  Temp trend- 24hrs T(F): 98 ( @ 12:00), Max: 98.7 ( @ 09:30)  Current antibiotics- cefoTEtan  IVPB 2 every 12 hours    ENDOCRINOLOGY:  - POCT fingersticks q6 hours while NPO  - Glucose goal 140-180    MSK:     Activity - Increase As Tolerated:     Time/Priority:  Routine (24 @ 10:11)      LINES/DRAINS:  PIVs, bilateral cephalic midlines ()    ADVANCED DIRECTIVES:  Full Code    HCP/Emergency Contact-    INDICATION FOR SICU/SDU: Morbid or severe obesity due to excess calories      Creation, bypass, gastric, laparoscopic, robot-assisted             DISPO:   SICU. Case discussed with SICU attending Dr. Burnett.

## 2024-05-01 NOTE — CONSULT NOTE ADULT - CRITICAL CARE ATTENDING COMMENT
Critical Care: [id number]   This patient has a high probability of sudden, clinically significant deterioration, which requires the highest level of physician preparedness to intervene urgently. I managed/supervised life or organ supporting interventions that required frequent physician assessment. I have reviewed and agree with note above. I devoted my full attention to the direct care of this patient for the period of time indicated, including reviewing relevant labs and imaging, discussing treatment plan with the SICU team, nurses, and primary team at the time of multidisciplinary rounds, and reviewing findings with patient and family. This does not include time devoted to teaching any trainees and to performing any procedures.    SARAH BERGMAN 58y Female admitted to SICU with hypotension and increased oxygen requirement s/p lap gastric bypass and hiatal hernia repair and s/p J-J revision on POD1    Increased oxygen requirement 2/2 aspiration pneumonitis 2/2 large-volume emesis observed on intubation for second surgery.   Hypotension likely 2/2 hypovolemia vs antihypertensive medication vs less likely sepsis    Issues we are addressing today:    Neuro: multimodal pain medsrestarting home meds as possible, delirium precautions, sleep meds as needed at night  CV: optimize fluid status, wean pressors as tolerated   Respiratory: continue to wean support as possible, hfnc at this time for aspiration pneumonia  Nutrition: npo  Renal: monitor Is &Os  ID: abx periop, will consider broadening if septic picture develops  Lines: d/c as tolerated  Heme: continue to evaluate for acute blood loss anemia   Endocrine: Prevent and treat hyperglycemia with insulin as needed    Skin: decub precautions  DVT Prophylaxis   Stress Gastritis Prophylaxis   Mobility: bedrest until off pressors    The above note is NOT written at the time of rounds and will reflect all changes throughout management of the patient for the day note is written for.    Wolf Burnett MD, D.DALIA

## 2024-05-01 NOTE — CHART NOTE - NSCHARTNOTEFT_GEN_A_CORE
patient seen and examined at bedside for post op check. She came in as elective for gastric bypass and hiatal hernia repair, POD 2, Hosp day 3. Now is POD 0 for RTOR for diagnostic lap with JJ anastomosis revision, JASON, and upper endoscopic release of SBO.     Was found to be hypotensive to systolic 60-70s and tachycardic to 110s. She was AOx3 but having some visual hallucinations, feeling lightheaded, and had a dry cough. Her BP prior had been 96-180s. Afebrile.     Abd was soft, non peritonitic and was tender to palpation around incisions but were clean, dry, and intact.     She was given a 1 LR bolus and raised her legs for BP support. her IV in the right AC was non functioning so she had not received IV fluids since her surgery. BP not initially responsive due to IV placement issues. A rapid was called due to hemodynamic instability and her mentation was declining as she became less alert. A RUE Midline was placed and additional peripheral IV was placed to fluid resuscitate. Stat labs were drawn with elevated lactate to 3, troponins and CKMB elevation, Cr 1.5 from 1.2. Likely from demand ischemia from being fluid behind. EKG with sinus tachycardia. a CXR showed bilateral infiltrates suspected for aspiration pneumonitis due to her abdominal surgery. Patient was stabilized and SICU consulted for hemodynamic support. levophed ordered but not given. After fluids were able to run, her BP came up to 90s/50s. She was transferred to SICU for further monitoring where a second midline was placed.     will continue to monitor with IVF fluids for resuscitation and KIKA resolution, start antibiotics for pneumonia coverage, trend troponins, pain control, and monitor BP.     Dr Montoya aware and evaluated patient at bedside.     Brett Polanco MD  General Surgery

## 2024-05-01 NOTE — BRIEF OPERATIVE NOTE - NSICDXBRIEFPROCEDURE_GEN_ALL_CORE_FT
PROCEDURES:  Diagnostic laparoscopy 01-May-2024 00:54:34  Mouna Vee  Revision of anastomosis of small intestine 01-May-2024 00:55:01  Mouna Vee  Laparoscopic lysis of abdominal adhesions 01-May-2024 00:55:10  Mouna Vee  Upper endoscopy 01-May-2024 00:55:15  Mouna Vee  Release of small bowel obstruction 01-May-2024 00:55:43  Mouna Vee  
PROCEDURES:  Robot-assisted hiatal herniorrhaphy using da Madhuri Xi 29-Apr-2024 12:10:22  Mouna Vee  Creation, bypass, gastric, laparoscopic, robot-assisted 29-Apr-2024 12:10:33  Mouna Vee

## 2024-05-01 NOTE — BRIEF OPERATIVE NOTE - NSICDXBRIEFPREOP_GEN_ALL_CORE_FT
PRE-OP DIAGNOSIS:  Hiatal hernia 29-Apr-2024 12:10:47  Mouna Vee  Class 3 obesity 29-Apr-2024 12:10:54  Mouna Vee  
PRE-OP DIAGNOSIS:  Class 3 obesity 29-Apr-2024 12:10:54  Mouna Vee  Small bowel obstruction 01-May-2024 00:56:10  Mouna Vee

## 2024-05-01 NOTE — BRIEF OPERATIVE NOTE - NSICDXBRIEFPOSTOP_GEN_ALL_CORE_FT
POST-OP DIAGNOSIS:  Hiatal hernia 29-Apr-2024 12:11:01  Mouna Vee  Class 3 obesity 29-Apr-2024 12:11:08  Mouna Vee  
POST-OP DIAGNOSIS:  Class 3 obesity 29-Apr-2024 12:11:08  Mouna Vee  Small bowel obstruction 01-May-2024 00:56:58  Mouna Vee

## 2024-05-02 LAB
ANION GAP SERPL CALC-SCNC: 7 MMOL/L — SIGNIFICANT CHANGE UP (ref 7–14)
ANION GAP SERPL CALC-SCNC: 8 MMOL/L — SIGNIFICANT CHANGE UP (ref 7–14)
BUN SERPL-MCNC: 11 MG/DL — SIGNIFICANT CHANGE UP (ref 10–20)
BUN SERPL-MCNC: 12 MG/DL — SIGNIFICANT CHANGE UP (ref 10–20)
CALCIUM SERPL-MCNC: 7.9 MG/DL — LOW (ref 8.4–10.5)
CALCIUM SERPL-MCNC: 8.2 MG/DL — LOW (ref 8.4–10.5)
CHLORIDE SERPL-SCNC: 105 MMOL/L — SIGNIFICANT CHANGE UP (ref 98–110)
CHLORIDE SERPL-SCNC: 108 MMOL/L — SIGNIFICANT CHANGE UP (ref 98–110)
CO2 SERPL-SCNC: 25 MMOL/L — SIGNIFICANT CHANGE UP (ref 17–32)
CO2 SERPL-SCNC: 28 MMOL/L — SIGNIFICANT CHANGE UP (ref 17–32)
CREAT SERPL-MCNC: 0.6 MG/DL — LOW (ref 0.7–1.5)
CREAT SERPL-MCNC: 0.9 MG/DL — SIGNIFICANT CHANGE UP (ref 0.7–1.5)
EGFR: 104 ML/MIN/1.73M2 — SIGNIFICANT CHANGE UP
EGFR: 74 ML/MIN/1.73M2 — SIGNIFICANT CHANGE UP
GAS PNL BLDA: SIGNIFICANT CHANGE UP
GAS PNL BLDA: SIGNIFICANT CHANGE UP
GLUCOSE BLDC GLUCOMTR-MCNC: 106 MG/DL — HIGH (ref 70–99)
GLUCOSE BLDC GLUCOMTR-MCNC: 108 MG/DL — HIGH (ref 70–99)
GLUCOSE BLDC GLUCOMTR-MCNC: 110 MG/DL — HIGH (ref 70–99)
GLUCOSE BLDC GLUCOMTR-MCNC: 95 MG/DL — SIGNIFICANT CHANGE UP (ref 70–99)
GLUCOSE SERPL-MCNC: 113 MG/DL — HIGH (ref 70–99)
GLUCOSE SERPL-MCNC: 95 MG/DL — SIGNIFICANT CHANGE UP (ref 70–99)
HCT VFR BLD CALC: 32.8 % — LOW (ref 37–47)
HCT VFR BLD CALC: 34.2 % — LOW (ref 37–47)
HCT VFR BLD CALC: 35.1 % — LOW (ref 37–47)
HGB BLD-MCNC: 10 G/DL — LOW (ref 12–16)
HGB BLD-MCNC: 10.4 G/DL — LOW (ref 12–16)
HGB BLD-MCNC: 10.9 G/DL — LOW (ref 12–16)
MAGNESIUM SERPL-MCNC: 2.2 MG/DL — SIGNIFICANT CHANGE UP (ref 1.8–2.4)
MAGNESIUM SERPL-MCNC: 2.4 MG/DL — SIGNIFICANT CHANGE UP (ref 1.8–2.4)
MCHC RBC-ENTMCNC: 26.6 PG — LOW (ref 27–31)
MCHC RBC-ENTMCNC: 27.2 PG — SIGNIFICANT CHANGE UP (ref 27–31)
MCHC RBC-ENTMCNC: 27.3 PG — SIGNIFICANT CHANGE UP (ref 27–31)
MCHC RBC-ENTMCNC: 30.4 G/DL — LOW (ref 32–37)
MCHC RBC-ENTMCNC: 30.5 G/DL — LOW (ref 32–37)
MCHC RBC-ENTMCNC: 31.1 G/DL — LOW (ref 32–37)
MCV RBC AUTO: 87.5 FL — SIGNIFICANT CHANGE UP (ref 81–99)
MCV RBC AUTO: 87.8 FL — SIGNIFICANT CHANGE UP (ref 81–99)
MCV RBC AUTO: 89.4 FL — SIGNIFICANT CHANGE UP (ref 81–99)
MRSA PCR RESULT.: NEGATIVE — SIGNIFICANT CHANGE UP
NRBC # BLD: 0 /100 WBCS — SIGNIFICANT CHANGE UP (ref 0–0)
PHOSPHATE SERPL-MCNC: 2.4 MG/DL — SIGNIFICANT CHANGE UP (ref 2.1–4.9)
PHOSPHATE SERPL-MCNC: 3.3 MG/DL — SIGNIFICANT CHANGE UP (ref 2.1–4.9)
PLATELET # BLD AUTO: 197 K/UL — SIGNIFICANT CHANGE UP (ref 130–400)
PLATELET # BLD AUTO: 253 K/UL — SIGNIFICANT CHANGE UP (ref 130–400)
PLATELET # BLD AUTO: 301 K/UL — SIGNIFICANT CHANGE UP (ref 130–400)
PMV BLD: 10.6 FL — HIGH (ref 7.4–10.4)
PMV BLD: 10.7 FL — HIGH (ref 7.4–10.4)
PMV BLD: 10.9 FL — HIGH (ref 7.4–10.4)
POTASSIUM SERPL-MCNC: 4.1 MMOL/L — SIGNIFICANT CHANGE UP (ref 3.5–5)
POTASSIUM SERPL-MCNC: 4.1 MMOL/L — SIGNIFICANT CHANGE UP (ref 3.5–5)
POTASSIUM SERPL-SCNC: 4.1 MMOL/L — SIGNIFICANT CHANGE UP (ref 3.5–5)
POTASSIUM SERPL-SCNC: 4.1 MMOL/L — SIGNIFICANT CHANGE UP (ref 3.5–5)
RBC # BLD: 3.67 M/UL — LOW (ref 4.2–5.4)
RBC # BLD: 3.91 M/UL — LOW (ref 4.2–5.4)
RBC # BLD: 4 M/UL — LOW (ref 4.2–5.4)
RBC # FLD: 15.2 % — HIGH (ref 11.5–14.5)
RBC # FLD: 15.4 % — HIGH (ref 11.5–14.5)
RBC # FLD: 15.7 % — HIGH (ref 11.5–14.5)
SODIUM SERPL-SCNC: 140 MMOL/L — SIGNIFICANT CHANGE UP (ref 135–146)
SODIUM SERPL-SCNC: 141 MMOL/L — SIGNIFICANT CHANGE UP (ref 135–146)
TROPONIN T, HIGH SENSITIVITY RESULT: 37 NG/L — HIGH (ref 6–13)
TROPONIN T, HIGH SENSITIVITY RESULT: 37 NG/L — HIGH (ref 6–13)
WBC # BLD: 11.65 K/UL — HIGH (ref 4.8–10.8)
WBC # BLD: 18 K/UL — HIGH (ref 4.8–10.8)
WBC # BLD: 19.75 K/UL — HIGH (ref 4.8–10.8)
WBC # FLD AUTO: 11.65 K/UL — HIGH (ref 4.8–10.8)
WBC # FLD AUTO: 18 K/UL — HIGH (ref 4.8–10.8)
WBC # FLD AUTO: 19.75 K/UL — HIGH (ref 4.8–10.8)

## 2024-05-02 PROCEDURE — 71045 X-RAY EXAM CHEST 1 VIEW: CPT | Mod: 26

## 2024-05-02 PROCEDURE — 99291 CRITICAL CARE FIRST HOUR: CPT

## 2024-05-02 RX ORDER — ACETAMINOPHEN 500 MG
1000 TABLET ORAL ONCE
Refills: 0 | Status: COMPLETED | OUTPATIENT
Start: 2024-05-03 | End: 2024-05-03

## 2024-05-02 RX ORDER — PIPERACILLIN AND TAZOBACTAM 4; .5 G/20ML; G/20ML
4.5 INJECTION, POWDER, LYOPHILIZED, FOR SOLUTION INTRAVENOUS EVERY 8 HOURS
Refills: 0 | Status: COMPLETED | OUTPATIENT
Start: 2024-05-02 | End: 2024-05-08

## 2024-05-02 RX ORDER — PANTOPRAZOLE SODIUM 20 MG/1
40 TABLET, DELAYED RELEASE ORAL
Refills: 0 | Status: DISCONTINUED | OUTPATIENT
Start: 2024-05-02 | End: 2024-05-02

## 2024-05-02 RX ORDER — SODIUM CHLORIDE 9 MG/ML
1000 INJECTION, SOLUTION INTRAVENOUS
Refills: 0 | Status: DISCONTINUED | OUTPATIENT
Start: 2024-05-02 | End: 2024-05-03

## 2024-05-02 RX ORDER — HEPARIN SODIUM 5000 [USP'U]/ML
7500 INJECTION INTRAVENOUS; SUBCUTANEOUS EVERY 8 HOURS
Refills: 0 | Status: DISCONTINUED | OUTPATIENT
Start: 2024-05-02 | End: 2024-05-03

## 2024-05-02 RX ORDER — ACETAMINOPHEN 500 MG
1000 TABLET ORAL ONCE
Refills: 0 | Status: DISCONTINUED | OUTPATIENT
Start: 2024-05-02 | End: 2024-05-02

## 2024-05-02 RX ORDER — ACETAMINOPHEN 500 MG
975 TABLET ORAL EVERY 6 HOURS
Refills: 0 | Status: DISCONTINUED | OUTPATIENT
Start: 2024-05-02 | End: 2024-05-02

## 2024-05-02 RX ORDER — SODIUM CHLORIDE 9 MG/ML
4 INJECTION INTRAMUSCULAR; INTRAVENOUS; SUBCUTANEOUS EVERY 12 HOURS
Refills: 0 | Status: DISCONTINUED | OUTPATIENT
Start: 2024-05-02 | End: 2024-05-10

## 2024-05-02 RX ORDER — ACETAMINOPHEN 500 MG
1000 TABLET ORAL ONCE
Refills: 0 | Status: COMPLETED | OUTPATIENT
Start: 2024-05-02 | End: 2024-05-02

## 2024-05-02 RX ORDER — KETOROLAC TROMETHAMINE 30 MG/ML
15 SYRINGE (ML) INJECTION ONCE
Refills: 0 | Status: DISCONTINUED | OUTPATIENT
Start: 2024-05-02 | End: 2024-05-02

## 2024-05-02 RX ORDER — PANTOPRAZOLE SODIUM 20 MG/1
40 TABLET, DELAYED RELEASE ORAL EVERY 24 HOURS
Refills: 0 | Status: DISCONTINUED | OUTPATIENT
Start: 2024-05-02 | End: 2024-05-10

## 2024-05-02 RX ORDER — CALCIUM GLUCONATE 100 MG/ML
1 VIAL (ML) INTRAVENOUS ONCE
Refills: 0 | Status: COMPLETED | OUTPATIENT
Start: 2024-05-02 | End: 2024-05-02

## 2024-05-02 RX ADMIN — HEPARIN SODIUM 5000 UNIT(S): 5000 INJECTION INTRAVENOUS; SUBCUTANEOUS at 06:50

## 2024-05-02 RX ADMIN — SODIUM CHLORIDE 150 MILLILITER(S): 9 INJECTION, SOLUTION INTRAVENOUS at 06:50

## 2024-05-02 RX ADMIN — PIPERACILLIN AND TAZOBACTAM 25 GRAM(S): 4; .5 INJECTION, POWDER, LYOPHILIZED, FOR SOLUTION INTRAVENOUS at 06:50

## 2024-05-02 RX ADMIN — SIMETHICONE 80 MILLIGRAM(S): 80 TABLET, CHEWABLE ORAL at 00:18

## 2024-05-02 RX ADMIN — Medication 400 MILLIGRAM(S): at 04:54

## 2024-05-02 RX ADMIN — Medication 15 MILLIGRAM(S): at 17:16

## 2024-05-02 RX ADMIN — PHENYLEPHRINE HYDROCHLORIDE 4.2 MICROGRAM(S)/KG/MIN: 10 INJECTION INTRAVENOUS at 06:50

## 2024-05-02 RX ADMIN — PIPERACILLIN AND TAZOBACTAM 25 GRAM(S): 4; .5 INJECTION, POWDER, LYOPHILIZED, FOR SOLUTION INTRAVENOUS at 15:00

## 2024-05-02 RX ADMIN — Medication 15 MILLIGRAM(S): at 17:31

## 2024-05-02 RX ADMIN — Medication 1000 MILLIGRAM(S): at 05:24

## 2024-05-02 RX ADMIN — CHLORHEXIDINE GLUCONATE 1 APPLICATION(S): 213 SOLUTION TOPICAL at 06:51

## 2024-05-02 RX ADMIN — GABAPENTIN 100 MILLIGRAM(S): 400 CAPSULE ORAL at 06:51

## 2024-05-02 RX ADMIN — HEPARIN SODIUM 7500 UNIT(S): 5000 INJECTION INTRAVENOUS; SUBCUTANEOUS at 14:28

## 2024-05-02 RX ADMIN — Medication 3 MILLILITER(S): at 14:15

## 2024-05-02 RX ADMIN — Medication 975 MILLIGRAM(S): at 12:38

## 2024-05-02 RX ADMIN — Medication 3 MILLILITER(S): at 08:12

## 2024-05-02 RX ADMIN — Medication 400 MILLIGRAM(S): at 18:28

## 2024-05-02 RX ADMIN — PANTOPRAZOLE SODIUM 40 MILLIGRAM(S): 20 TABLET, DELAYED RELEASE ORAL at 18:27

## 2024-05-02 RX ADMIN — Medication 10 MILLIGRAM(S): at 21:44

## 2024-05-02 RX ADMIN — Medication 100 GRAM(S): at 08:04

## 2024-05-02 RX ADMIN — PIPERACILLIN AND TAZOBACTAM 25 GRAM(S): 4; .5 INJECTION, POWDER, LYOPHILIZED, FOR SOLUTION INTRAVENOUS at 21:38

## 2024-05-02 RX ADMIN — HEPARIN SODIUM 7500 UNIT(S): 5000 INJECTION INTRAVENOUS; SUBCUTANEOUS at 21:39

## 2024-05-02 RX ADMIN — Medication 3 MILLILITER(S): at 02:57

## 2024-05-02 RX ADMIN — SIMETHICONE 80 MILLIGRAM(S): 80 TABLET, CHEWABLE ORAL at 06:50

## 2024-05-02 RX ADMIN — Medication 3 MILLILITER(S): at 19:40

## 2024-05-02 NOTE — PHARMACOTHERAPY INTERVENTION NOTE - INTERVENTION TYPE RECOOMEND
IV to PO
Dose Optimization/Non-renal Dose Adjustment
IV to PO
Therapy Discontinuation Recommended - No indication

## 2024-05-02 NOTE — PROGRESS NOTE ADULT - ASSESSMENT
ASSESSMENT:  58y F w/ PMHx of  obesity (BMI 43), GERD, RAFA (CPAP), HTN, ASTHMA admitted for elective gastric bypass and hiatal hernia repair, POD 2, Hosp day 3. Now is POD 0 for RTOR for diagnostic lap with JJ anastomosis revision, JASON, and upper endoscopic release of SBO.    PLAN:     - pain control   - NPO sips and chips- ADAT   - Monitor UOP   - Hemodynamic monitoring     DVT/GI ppx     BLUE TEAM SPECTRA: [id number]         ASSESSMENT:  58y F w/ PMHx of  obesity (BMI 43), GERD, RAFA (CPAP), HTN, ASTHMA admitted for elective gastric bypass and hiatal hernia repair, POD 2, Hosp day 3. Now is POD 0 for RTOR for diagnostic lap with JJ anastomosis revision, JASON, and upper endoscopic release of SBO.    PLAN:   - Continue with Zosyn   - Wean off pressors   - pain control   - NPO sips and chips- ADAT   - Monitor UOP   - Hemodynamic monitoring   - DVT/GI ppx     BLUE TEAM SPECTRA: [id number]

## 2024-05-02 NOTE — PROGRESS NOTE ADULT - ASSESSMENT
· Assessment	  58y Female s/p robotic-assisted hiatal herniorraphy w/ gastric bypass (4/29), diagnostic laparoscopy with released and revised JJ anastomosis narrowing w/ negative leak test (4/30).    NEUROLOGICAL:  #acute pain  - IV tylenol ATC  - gabapentin 100mg q8 hours solution  - hyoscyamine SL  - dilaudid 0.5mg q4 hours PRN  #Depression  - continue home duloxetine  - continue home wellbutrin    RESPIRATORY:   #aspiration pneumonia with hypoxic respiratory failure  - On HFNC 40L/60%  - wean oxygen as tolerated  - ABG:   ( 02 May 2024 00:18 )  pH, Arterial: 7.38/47/60/28 RT to put on 50L/60%  -Duonebs  - chest xray: bilateral pulmonary opacities    CARDIOVASCULAR:   #Hypotension  - likely secondary to dehydration/ enalapril in AM  - if not resolving, then consider sepsis  - on phenylephrine infusion  #HTN  - HOLDING home antihypertensives (losartan, spironolactone)  Elevated troponemia (25 --> 40 -->  )  - no EKG changes concerning for ACS  - likely 2/2 hypotension demand  - will continue to trend  - EKG sinus tachycardia  - no current concern for ACS   - cardiology consult placed with her cardiologist Dr. Gomez    GASTROINTESTINAL/NUTRITION:   #s/p robotic-assisted hiatal herniorraphy w/ gastric bypass (4/29), diagnostic laparoscopy with released and revised JJ anastomosis narrowing w/ negative leak test (4/30)  - LLQ CECE drain with serosanguinous output  #Diet, NPO, except meds  - AVOID tablets/ capsules  #GI Prophylaxis  - protonix  #Bowel regimen  - none     /RENAL:   #urine output in critically ill    -voiding  #KIKA  - f/u urine lytes   Monitor UO-bowden in place  Labs:          BUN/Cr- 14/1.2  -->,  15/1.5  -->,  15/1.0  -->          Electrolytes-(05-01 @ 16:50)Na 138 // K 4.4 // Mg 2.2 // Phos 5.0       #IV fluids  - LR @ 150 cc/hr  #elevated lactate  - 3 --> 2.2   - continue to trend       HEME/ONC:   #DVT prophylaxis  - switched to heparin subQ (avoid lovenox due to KIKA)  - SCDs  Hb/Hct:  13.6/44.8  -->,  12.4/40.2  -->,  12.1/38.9  -->  Plts:  365  -->,  310  -->,  376  -->    PTT/INR: 28.7/1.13 (05-01)  T&S Expires: 5/4    ID:  #Antibiotics  - zosyn  WBC- 7.94  --->>,  10.45  --->>,  20.98  --->>  Temp trend- 24hrs T(F): 99.1 (05-01 @ 21:00), Max: 100.1 (05-01 @ 17:00)  Antibiotics-piperacillin/tazobactam IVPB.. 3.375 every 8 hours    ENDOCRINOLOGY:  - POCT fingersticks q6 hours while NPO  - Glucose goal 140-180    MSK:     Activity - Increase As Tolerated:     Time/Priority:  Routine (05-01-24 @ 10:11)      LINES/DRAINS:  PIVs, bilateral cephalic midlines (5/1)    ADVANCED DIRECTIVES:  Full Code    HCP/Emergency Contact-    INDICATION FOR SICU/SDU: Morbid or severe obesity due to excess calories      Creation, bypass, gastric, laparoscopic, robot-assisted             DISPO:   SICU. Case discussed with SICU attending Dr. Burnett.

## 2024-05-02 NOTE — PROGRESS NOTE ADULT - SUBJECTIVE AND OBJECTIVE BOX
GENERAL SURGERY PROGRESS NOTE    Patient: SARAH BERGMAN , 58y ([birth year])Female   MRN: [id number]  Location: [address]  Visit: 04-29-24 Inpatient  Date: 05-02-24 @ 10:38    Hospital Day #:  Post-Op Day #:    Procedure/Dx/Injuries:    Events of past 24 hours:    PAST MEDICAL & SURGICAL HISTORY:  Asthma      Depression      GERD (gastroesophageal reflux disease)      Neck pain      Back pain      HTN (hypertension)      RAFA (obstructive sleep apnea)      Pseudogout      Renal stones      Migraines      Severe obesity (BMI >= 40)      History of cholecystectomy      H/O bladder repair surgery      Renal stones      H/O removal of cyst          Vitals:   T(F): 97 (05-02-24 @ 07:38), Max: 100.1 (05-01-24 @ 17:00)  HR: 88 (05-02-24 @ 10:30)  BP: 108/66 (05-02-24 @ 10:30)  RR: 49 (05-02-24 @ 10:30)  SpO2: 95% (05-02-24 @ 10:30)      Diet, NPO:   Except Medications      Fluids: lactated ringers.: Solution, 1000 milliLiter(s) infuse at 100 mL/Hr  Provider's Contact #: [phone]      I & O's:    05-01-24 @ 07:01  -  05-02-24 @ 07:00  --------------------------------------------------------  IN:    IV PiggyBack: 700 mL    Lactated Ringers: 1450 mL    Lactated Ringers: 1950 mL    Lactated Ringers Bolus: 1000 mL    Norepinephrine: 67.2 mL    Phenylephrine: 1230.6 mL  Total IN: 6397.8 mL    OUT:    Bulb (mL): 130 mL    Voided (mL): 1700 mL  Total OUT: 1830 mL    Total NET: 4567.8 mL        Bowel Movement: : [] YES [] NO  Flatus: : [] YES [] NO    PHYSICAL EXAM:  General: NAD, AAOx3, calm and cooperative  HEENT: NCAT, DONNA, EOMI, Trachea ML, Neck supple  Cardiac: RRR S1, S2, no Murmurs, rubs or gallops  Respiratory: CTAB, normal respiratory effort, breath sounds equal BL, no wheeze, rhonchi or crackles  Abdomen: Soft, non-distended, non-tender, no rebound, no guarding. +BS.  Rectal: Good tone, +stool, no blood, no segundo-anal masses/lesions, no fistulas, fissures, hemorrhoids  Musculoskeletal: Strength 5/5 BL UE/LE, ROM intact, compartments soft  Neuro: Sensation grossly intact and equal throughout, no focal deficits  Vascular: Pulses 2+ throughout, extremities well perfused  Skin: Warm/dry, normal color, no jaundice  Incision/wound: healing well, dressings in place, clean, dry and intact    MEDICATIONS  (STANDING):  acetaminophen   Oral Liquid .. 975 milliGRAM(s) Oral every 6 hours  albuterol/ipratropium for Nebulization 3 milliLiter(s) Nebulizer every 6 hours  amitriptyline 10 milliGRAM(s) Oral at bedtime  buPROPion XL (24-Hour) . 150 milliGRAM(s) Oral daily  chlorhexidine 2% Cloths 1 Application(s) Topical <User Schedule>  DULoxetine 60 milliGRAM(s) Oral daily  gabapentin Solution 100 milliGRAM(s) Oral every 8 hours  heparin   Injectable 7500 Unit(s) SubCutaneous every 8 hours  lactated ringers. 1000 milliLiter(s) (100 mL/Hr) IV Continuous <Continuous>  pantoprazole    Tablet 40 milliGRAM(s) Oral before breakfast  phenylephrine    Infusion 0.1 MICROgram(s)/kG/Min (4.2 mL/Hr) IV Continuous <Continuous>  piperacillin/tazobactam IVPB.. 4.5 Gram(s) IV Intermittent every 8 hours  simethicone 80 milliGRAM(s) Chew four times a day  sodium chloride 3%  Inhalation 4 milliLiter(s) Inhalation every 12 hours    MEDICATIONS  (PRN):  albuterol    90 MICROgram(s) HFA Inhaler 2 Puff(s) Inhalation every 6 hours PRN Shortness of Breath and/or Wheezing  HYDROmorphone  Injectable 0.5 milliGRAM(s) IV Push every 4 hours PRN Severe Pain (7 - 10)  hyoscyamine SL 0.125 milliGRAM(s) SubLingual every 4 hours PRN Gastric Spasms  ondansetron Injectable 4 milliGRAM(s) IV Push every 6 hours PRN Nausea and/or Vomiting      DVT PROPHYLAXIS: heparin   Injectable 7500 Unit(s) SubCutaneous every 8 hours    GI PROPHYLAXIS: pantoprazole    Tablet 40 milliGRAM(s) Oral before breakfast    ANTICOAGULATION:   ANTIBIOTICS:  piperacillin/tazobactam IVPB.. 4.5 Gram(s)            LAB/STUDIES:  Labs:  CAPILLARY BLOOD GLUCOSE      POCT Blood Glucose.: 95 mg/dL (02 May 2024 06:48)  POCT Blood Glucose.: 110 mg/dL (02 May 2024 00:43)  POCT Blood Glucose.: 127 mg/dL (01 May 2024 18:20)                          10.4   18.00 )-----------( 253      ( 02 May 2024 05:01 )             34.2       Auto Neutrophil %: 88.6 % (05-01-24 @ 16:50)  Auto Immature Granulocyte %: 0.5 % (05-01-24 @ 16:50)    05-02    140  |  108  |  12  ----------------------------<  113<H>  4.1   |  25  |  0.9      Calcium: 7.9 mg/dL (05-02-24 @ 00:27)      LFTs:     Blood Gas Arterial, Lactate: 1.5 mmol/L (05-02-24 @ 06:13)  Blood Gas Arterial, Lactate: 1.7 mmol/L (05-02-24 @ 00:18)  Blood Gas Arterial, Lactate: 2.2 mmol/L (05-01-24 @ 18:01)  Lactate, Blood: 3.3 mmol/L (05-01-24 @ 09:01)  Blood Gas Arterial, Lactate: 3.0 mmol/L (05-01-24 @ 08:58)    ABG - ( 02 May 2024 06:13 )  pH: 7.43  /  pCO2: 44    /  pO2: 80    / HCO3: 29    / Base Excess: 4.2   /  SaO2: 98.6            ABG - ( 02 May 2024 00:18 )  pH: 7.38  /  pCO2: 47    /  pO2: 60    / HCO3: 28    / Base Excess: 2.0   /  SaO2: 91.9            ABG - ( 01 May 2024 18:01 )  pH: 7.39  /  pCO2: 45    /  pO2: 64    / HCO3: 27    / Base Excess: 1.7   /  SaO2: 94.7              Coags:     12.90  ----< 1.13    ( 01 May 2024 09:01 )     28.7        CARDIAC MARKERS ( 01 May 2024 09:01 )  x     / x     / 275 U/L / x     / 4.7 ng/mL          Urinalysis Basic - ( 02 May 2024 00:27 )    Color: x / Appearance: x / SG: x / pH: x  Gluc: 113 mg/dL / Ketone: x  / Bili: x / Urobili: x   Blood: x / Protein: x / Nitrite: x   Leuk Esterase: x / RBC: x / WBC x   Sq Epi: x / Non Sq Epi: x / Bacteria: x        IMAGING:           GENERAL SURGERY PROGRESS NOTE    Patient: SARAH BERGMAN , 58y ([birth year])Female   MRN: [id number]  Location: [address]  Visit: 04-29-24 Inpatient  Date: 05-02-24 @ 10:38    Hospital Day #: 4  Post-Op Day #: 3    Procedure/Dx/Injuries: Procedure: Creation, bypass, gastric, laparoscopic, robot-assisted    Robot-assisted hiatal herniorrhaphy using da Madhuri Xi    Creation, bypass, gastric, laparoscopic, robot-assisted    Diagnostic laparoscopy    Revision of anastomosis of small intestine    Laparoscopic lysis of abdominal adhesions    Upper endoscopy    Release of small bowel obstruction      Events of past 24 hours: Patient seen and examined at bedside. On pressors .02. On HFNC. MRSA (-). Pt passing gas. Advance diet to ice chips.     PAST MEDICAL & SURGICAL HISTORY:  Asthma      Depression      GERD (gastroesophageal reflux disease)      Neck pain      Back pain      HTN (hypertension)      RAFA (obstructive sleep apnea)      Pseudogout      Renal stones      Migraines      Severe obesity (BMI >= 40)      History of cholecystectomy      H/O bladder repair surgery      Renal stones      H/O removal of cyst          Vitals:   T(F): 97 (05-02-24 @ 07:38), Max: 100.1 (05-01-24 @ 17:00)  HR: 88 (05-02-24 @ 10:30)  BP: 108/66 (05-02-24 @ 10:30)  RR: 49 (05-02-24 @ 10:30)  SpO2: 95% (05-02-24 @ 10:30)      Diet, NPO:   Except Medications      Fluids: lactated ringers.: Solution, 1000 milliLiter(s) infuse at 100 mL/Hr  Provider's Contact #: [phone]      I & O's:    05-01-24 @ 07:01  -  05-02-24 @ 07:00  --------------------------------------------------------  IN:    IV PiggyBack: 700 mL    Lactated Ringers: 1450 mL    Lactated Ringers: 1950 mL    Lactated Ringers Bolus: 1000 mL    Norepinephrine: 67.2 mL    Phenylephrine: 1230.6 mL  Total IN: 6397.8 mL    OUT:    Bulb (mL): 130 mL    Voided (mL): 1700 mL  Total OUT: 1830 mL    Total NET: 4567.8 mL      PHYSICAL EXAM:  GENERAL: AOx3  CHEST/LUNG: Clear to auscultation bilaterally on RA   HEART: tachycardic and hypotensive   ABDOMEN: Soft, moderately tender around incisions, mild-mod distended; no rebound or guarding. incisions c/d/i.   EXTREMITIES:  No clubbing, cyanosis, or edema    MEDICATIONS  (STANDING):  acetaminophen   Oral Liquid .. 975 milliGRAM(s) Oral every 6 hours  albuterol/ipratropium for Nebulization 3 milliLiter(s) Nebulizer every 6 hours  amitriptyline 10 milliGRAM(s) Oral at bedtime  buPROPion XL (24-Hour) . 150 milliGRAM(s) Oral daily  chlorhexidine 2% Cloths 1 Application(s) Topical <User Schedule>  DULoxetine 60 milliGRAM(s) Oral daily  gabapentin Solution 100 milliGRAM(s) Oral every 8 hours  heparin   Injectable 7500 Unit(s) SubCutaneous every 8 hours  lactated ringers. 1000 milliLiter(s) (100 mL/Hr) IV Continuous <Continuous>  pantoprazole    Tablet 40 milliGRAM(s) Oral before breakfast  phenylephrine    Infusion 0.1 MICROgram(s)/kG/Min (4.2 mL/Hr) IV Continuous <Continuous>  piperacillin/tazobactam IVPB.. 4.5 Gram(s) IV Intermittent every 8 hours  simethicone 80 milliGRAM(s) Chew four times a day  sodium chloride 3%  Inhalation 4 milliLiter(s) Inhalation every 12 hours    MEDICATIONS  (PRN):  albuterol    90 MICROgram(s) HFA Inhaler 2 Puff(s) Inhalation every 6 hours PRN Shortness of Breath and/or Wheezing  HYDROmorphone  Injectable 0.5 milliGRAM(s) IV Push every 4 hours PRN Severe Pain (7 - 10)  hyoscyamine SL 0.125 milliGRAM(s) SubLingual every 4 hours PRN Gastric Spasms  ondansetron Injectable 4 milliGRAM(s) IV Push every 6 hours PRN Nausea and/or Vomiting      DVT PROPHYLAXIS: heparin   Injectable 7500 Unit(s) SubCutaneous every 8 hours    GI PROPHYLAXIS: pantoprazole    Tablet 40 milliGRAM(s) Oral before breakfast    ANTICOAGULATION:   ANTIBIOTICS:  piperacillin/tazobactam IVPB.. 4.5 Gram(s)            LAB/STUDIES:  Labs:  CAPILLARY BLOOD GLUCOSE      POCT Blood Glucose.: 95 mg/dL (02 May 2024 06:48)  POCT Blood Glucose.: 110 mg/dL (02 May 2024 00:43)  POCT Blood Glucose.: 127 mg/dL (01 May 2024 18:20)                          10.4   18.00 )-----------( 253      ( 02 May 2024 05:01 )             34.2       Auto Neutrophil %: 88.6 % (05-01-24 @ 16:50)  Auto Immature Granulocyte %: 0.5 % (05-01-24 @ 16:50)    05-02    140  |  108  |  12  ----------------------------<  113<H>  4.1   |  25  |  0.9      Calcium: 7.9 mg/dL (05-02-24 @ 00:27)      LFTs:     Blood Gas Arterial, Lactate: 1.5 mmol/L (05-02-24 @ 06:13)  Blood Gas Arterial, Lactate: 1.7 mmol/L (05-02-24 @ 00:18)  Blood Gas Arterial, Lactate: 2.2 mmol/L (05-01-24 @ 18:01)  Lactate, Blood: 3.3 mmol/L (05-01-24 @ 09:01)  Blood Gas Arterial, Lactate: 3.0 mmol/L (05-01-24 @ 08:58)    ABG - ( 02 May 2024 06:13 )  pH: 7.43  /  pCO2: 44    /  pO2: 80    / HCO3: 29    / Base Excess: 4.2   /  SaO2: 98.6            ABG - ( 02 May 2024 00:18 )  pH: 7.38  /  pCO2: 47    /  pO2: 60    / HCO3: 28    / Base Excess: 2.0   /  SaO2: 91.9            ABG - ( 01 May 2024 18:01 )  pH: 7.39  /  pCO2: 45    /  pO2: 64    / HCO3: 27    / Base Excess: 1.7   /  SaO2: 94.7              Coags:     12.90  ----< 1.13    ( 01 May 2024 09:01 )     28.7        CARDIAC MARKERS ( 01 May 2024 09:01 )  x     / x     / 275 U/L / x     / 4.7 ng/mL          Urinalysis Basic - ( 02 May 2024 00:27 )    Color: x / Appearance: x / SG: x / pH: x  Gluc: 113 mg/dL / Ketone: x  / Bili: x / Urobili: x   Blood: x / Protein: x / Nitrite: x   Leuk Esterase: x / RBC: x / WBC x   Sq Epi: x / Non Sq Epi: x / Bacteria: x        IMAGING:  < from: Xray Chest 1 View- PORTABLE-Routine (05.02.24 @ 05:55) >    Bilateral opacifications without difference

## 2024-05-02 NOTE — PROGRESS NOTE ADULT - SUBJECTIVE AND OBJECTIVE BOX
SARAH BERGMAN   [id number]   [birth year]  58yF    ============================   SICU Consult for hemodynamic instability requiring vasopressor support     24 Hour Events  -Admission under SICU service    ============================       HPI   59 y/o female BMI 42.4 PMH HTN, RAFA (not on home CPAP/BiPAP) GERD, pseudogout, migraines, renal stones, depression, asthma; PSH bladder repair, cholecystectomy. On 4/29/24, patient underwent an elective robotic-assisted hiatal herniorraphy w/ gastric bypass. On 4/30, patient was having abdominal pain and vomiting with a concern for possible aspiration. Patient underwent CT abdomen and pelvis showed a narrowed JJ anastomosis. On 4/31, patient was taken back to the OR for a diagnostic laparoscopy and found a narrowed jejunojejunal anastomosis, released and revised w/ negative leak test.    This morning, patient was found to by hypotensive and tachycardic on 4C by blue team. SICU consulted for resuscitation and hemodynamic instabililty. Patient was seen bedside on 4C. Patient is a/o x3, systolic BP in the 80s (improving to the 90s with fluid boluses), -110 sinus rhythm, SpO2 99% on NRFM at 15 LPM. Patient reports lower back pain and lower abdominal pain. Patient denies chest pain, dyspnea (although full inspiratory effort is limited by abdominal pain), nausea, vomiting, lightheadedness, dizziness.    Blue team bedside and says that current abdominal exam is her baseline and has not changed since post-op.      [X] A ten-point review of systems was otherwise negative except as noted above.  [  ] Due to altered mental status/intubation, subjective information was not attained from the patient. History was obtained, to the extent possible, from review of the chart and collateral sources of information.     SARAH BERGMAN   [id number]   [birth year]  58yF    ============================   SICU Consult for hemodynamic instability requiring vasopressor support     24 Hour Events  -Admission under SICU service    ============================       HPI   59 y/o female BMI 42.4 PMH HTN, RAFA (not on home CPAP/BiPAP) GERD, pseudogout, migraines, renal stones, depression, asthma; PSH bladder repair, cholecystectomy. On 4/29/24, patient underwent an elective robotic-assisted hiatal herniorraphy w/ gastric bypass. On 4/30, patient was having abdominal pain and vomiting with a concern for possible aspiration. Patient underwent CT abdomen and pelvis showed a narrowed JJ anastomosis. On 4/31, patient was taken back to the OR for a diagnostic laparoscopy and found a narrowed jejunojejunal anastomosis, released and revised w/ negative leak test.    This morning, patient was found to by hypotensive and tachycardic on 4C by blue team. SICU consulted for resuscitation and hemodynamic instabililty. Patient was seen bedside on 4C. Patient is a/o x3, systolic BP in the 80s (improving to the 90s with fluid boluses), -110 sinus rhythm, SpO2 99% on NRFM at 15 LPM. Patient reports lower back pain and lower abdominal pain. Patient denies chest pain, dyspnea (although full inspiratory effort is limited by abdominal pain), nausea, vomiting, lightheadedness, dizziness.    Blue team bedside and says that current abdominal exam is her baseline and has not changed since post-op.      [X] A ten-point review of systems was otherwise negative except as noted above.  [  ] Due to altered mental status/intubation, subjective information was not attained from the patient. History was obtained, to the extent possible, from review of the chart and collateral sources of information.    Daily     Daily     Diet, NPO:   Except Medications (05-01-24 @ 11:40)      CURRENT MEDS:  Neurologic Medications  acetaminophen   IVPB .. 1000 milliGRAM(s) IV Intermittent once  acetaminophen   IVPB .. 1000 milliGRAM(s) IV Intermittent once  acetaminophen   IVPB .. 1000 milliGRAM(s) IV Intermittent once  amitriptyline 10 milliGRAM(s) Oral at bedtime  buPROPion XL (24-Hour) . 150 milliGRAM(s) Oral daily  DULoxetine 60 milliGRAM(s) Oral daily  gabapentin Solution 100 milliGRAM(s) Oral every 8 hours  HYDROmorphone  Injectable 0.5 milliGRAM(s) IV Push every 4 hours PRN Severe Pain (7 - 10)  ondansetron Injectable 4 milliGRAM(s) IV Push every 6 hours PRN Nausea and/or Vomiting    Respiratory Medications  albuterol    90 MICROgram(s) HFA Inhaler 2 Puff(s) Inhalation every 6 hours PRN Shortness of Breath and/or Wheezing  albuterol/ipratropium for Nebulization 3 milliLiter(s) Nebulizer every 6 hours  sodium chloride 3%  Inhalation 4 milliLiter(s) Inhalation every 12 hours    Cardiovascular Medications  phenylephrine    Infusion 0.1 MICROgram(s)/kG/Min IV Continuous <Continuous>    Gastrointestinal Medications  hyoscyamine SL 0.125 milliGRAM(s) SubLingual every 4 hours PRN Gastric Spasms  lactated ringers. 1000 milliLiter(s) IV Continuous <Continuous>  pantoprazole  Injectable 40 milliGRAM(s) IV Push every 24 hours  simethicone 80 milliGRAM(s) Chew four times a day    Genitourinary Medications    Hematologic/Oncologic Medications  heparin   Injectable 5000 Unit(s) SubCutaneous every 8 hours    Antimicrobial/Immunologic Medications  piperacillin/tazobactam IVPB.. 3.375 Gram(s) IV Intermittent every 8 hours    Endocrine/Metabolic Medications    Topical/Other Medications  chlorhexidine 2% Cloths 1 Application(s) Topical <User Schedule>      ICU Vital Signs Last 24 Hrs  T(C): 36.1 (02 May 2024 07:38), Max: 37.8 (01 May 2024 17:00)  T(F): 97 (02 May 2024 07:38), Max: 100.1 (01 May 2024 17:00)  HR: 87 (02 May 2024 07:00) (78 - 107)  BP: 98/57 (02 May 2024 07:00) (65/35 - 127/65)  BP(mean): 74 (02 May 2024 07:00) (44 - 88)  ABP: --  ABP(mean): --  RR: 39 (02 May 2024 07:00) (24 - 48)  SpO2: 94% (02 May 2024 07:00) (88% - 100%)    O2 Parameters below as of 02 May 2024 07:00  Patient On (Oxygen Delivery Method): nasal cannula, high flow            Adult Advanced Hemodynamics Last 24 Hrs  CVP(mm Hg): --  CVP(cm H2O): --  CO: --  CI: --  PA: --  PA(mean): --  PCWP: --  SVR: --  SVRI: --  PVR: --  PVRI: --      ABG - ( 02 May 2024 06:13 )  pH, Arterial: 7.43  pH, Blood: x     /  pCO2: 44    /  pO2: 80    / HCO3: 29    / Base Excess: 4.2   /  SaO2: 98.6                I&O's Summary    01 May 2024 07:01  -  02 May 2024 07:00  --------------------------------------------------------  IN: 6397.8 mL / OUT: 1830 mL / NET: 4567.8 mL      I&O's Detail    01 May 2024 07:01  -  02 May 2024 07:00  --------------------------------------------------------  IN:    IV PiggyBack: 700 mL    Lactated Ringers: 1450 mL    Lactated Ringers: 1950 mL    Lactated Ringers Bolus: 1000 mL    Norepinephrine: 67.2 mL    Phenylephrine: 1230.6 mL  Total IN: 6397.8 mL    OUT:    Bulb (mL): 130 mL    Voided (mL): 1700 mL  Total OUT: 1830 mL    Total NET: 4567.8 mL          PHYSICAL EXAM:    General/Neuro  GCS:  15   = E 4  / V 5  / M 6     Deficits:  alert & oriented x 3, no focal deficits, moving all bilateral extremities    Lungs:  lung sounds present but diminished in all lung fields, slightly increased respiratory effort, no accessory muscle use, unable to speak in complete sentences, HFNC 60%/40L.    Cardiovascular : S1, S2.  Regular rate and rhythm.  Trace peripheral edema bilaterally (non-pitting).  Cardiac Rhythm: Normal Sinus Rhythm    GI: Abdomen soft, some tenderness to epigastric region on palpation, Non-distended, no rebound tenderness, surgical incisions intact with dermabond (no erythema, fluctuence, or drainage noted. LLQ CECE drain with serosanguinous fluid present (unchanged from yesterday).    Extremities: Extremities warm, pink, well-perfused. Pulses: Palpable PT pulses bilaterally    Derm: Good skin turgor, no skin breakdown.        CXR:     LABS:  CAPILLARY BLOOD GLUCOSE      POCT Blood Glucose.: 95 mg/dL (02 May 2024 06:48)  POCT Blood Glucose.: 110 mg/dL (02 May 2024 00:43)  POCT Blood Glucose.: 127 mg/dL (01 May 2024 18:20)  POCT Blood Glucose.: 106 mg/dL (01 May 2024 09:11)                          10.4   18.00 )-----------( 253      ( 02 May 2024 05:01 )             34.2       05-02    140  |  108  |  12  ----------------------------<  113<H>  4.1   |  25  |  0.9    Ca    7.9<L>      02 May 2024 00:27  Phos  3.3     05-02  Mg     2.4     05-02        PT/INR - ( 01 May 2024 09:01 )   PT: 12.90 sec;   INR: 1.13 ratio         PTT - ( 01 May 2024 09:01 )  PTT:28.7 sec  CARDIAC MARKERS ( 01 May 2024 09:01 )  x     / x     / 275 U/L / x     / 4.7 ng/mL      Urinalysis Basic - ( 02 May 2024 00:27 )    Color: x / Appearance: x / SG: x / pH: x  Gluc: 113 mg/dL / Ketone: x  / Bili: x / Urobili: x   Blood: x / Protein: x / Nitrite: x   Leuk Esterase: x / RBC: x / WBC x   Sq Epi: x / Non Sq Epi: x / Bacteria: x

## 2024-05-03 LAB
ANION GAP SERPL CALC-SCNC: 11 MMOL/L — SIGNIFICANT CHANGE UP (ref 7–14)
BUN SERPL-MCNC: 12 MG/DL — SIGNIFICANT CHANGE UP (ref 10–20)
CALCIUM SERPL-MCNC: 8.1 MG/DL — LOW (ref 8.4–10.5)
CHLORIDE SERPL-SCNC: 100 MMOL/L — SIGNIFICANT CHANGE UP (ref 98–110)
CO2 SERPL-SCNC: 27 MMOL/L — SIGNIFICANT CHANGE UP (ref 17–32)
CREAT SERPL-MCNC: 0.6 MG/DL — LOW (ref 0.7–1.5)
EGFR: 104 ML/MIN/1.73M2 — SIGNIFICANT CHANGE UP
GLUCOSE BLDC GLUCOMTR-MCNC: 102 MG/DL — HIGH (ref 70–99)
GLUCOSE BLDC GLUCOMTR-MCNC: 112 MG/DL — HIGH (ref 70–99)
GLUCOSE BLDC GLUCOMTR-MCNC: 116 MG/DL — HIGH (ref 70–99)
GLUCOSE BLDC GLUCOMTR-MCNC: 122 MG/DL — HIGH (ref 70–99)
GLUCOSE SERPL-MCNC: 104 MG/DL — HIGH (ref 70–99)
MAGNESIUM SERPL-MCNC: 2 MG/DL — SIGNIFICANT CHANGE UP (ref 1.8–2.4)
PHOSPHATE SERPL-MCNC: 2.6 MG/DL — SIGNIFICANT CHANGE UP (ref 2.1–4.9)
POTASSIUM SERPL-MCNC: 4.1 MMOL/L — SIGNIFICANT CHANGE UP (ref 3.5–5)
POTASSIUM SERPL-SCNC: 4.1 MMOL/L — SIGNIFICANT CHANGE UP (ref 3.5–5)
SODIUM SERPL-SCNC: 138 MMOL/L — SIGNIFICANT CHANGE UP (ref 135–146)

## 2024-05-03 PROCEDURE — 71045 X-RAY EXAM CHEST 1 VIEW: CPT | Mod: 26

## 2024-05-03 PROCEDURE — 99291 CRITICAL CARE FIRST HOUR: CPT

## 2024-05-03 RX ORDER — LANOLIN ALCOHOL/MO/W.PET/CERES
5 CREAM (GRAM) TOPICAL AT BEDTIME
Refills: 0 | Status: DISCONTINUED | OUTPATIENT
Start: 2024-05-03 | End: 2024-05-05

## 2024-05-03 RX ORDER — OXYCODONE HYDROCHLORIDE 5 MG/1
5 TABLET ORAL EVERY 4 HOURS
Refills: 0 | Status: DISCONTINUED | OUTPATIENT
Start: 2024-05-03 | End: 2024-05-03

## 2024-05-03 RX ORDER — ENOXAPARIN SODIUM 100 MG/ML
40 INJECTION SUBCUTANEOUS EVERY 24 HOURS
Refills: 0 | Status: DISCONTINUED | OUTPATIENT
Start: 2024-05-03 | End: 2024-05-06

## 2024-05-03 RX ORDER — HYDROXYZINE HCL 10 MG
25 TABLET ORAL ONCE
Refills: 0 | Status: COMPLETED | OUTPATIENT
Start: 2024-05-03 | End: 2024-05-03

## 2024-05-03 RX ORDER — FUROSEMIDE 40 MG
20 TABLET ORAL ONCE
Refills: 0 | Status: COMPLETED | OUTPATIENT
Start: 2024-05-03 | End: 2024-05-03

## 2024-05-03 RX ORDER — POTASSIUM PHOSPHATE, MONOBASIC POTASSIUM PHOSPHATE, DIBASIC 236; 224 MG/ML; MG/ML
15 INJECTION, SOLUTION INTRAVENOUS ONCE
Refills: 0 | Status: DISCONTINUED | OUTPATIENT
Start: 2024-05-03 | End: 2024-05-03

## 2024-05-03 RX ORDER — KETOROLAC TROMETHAMINE 30 MG/ML
15 SYRINGE (ML) INJECTION EVERY 6 HOURS
Refills: 0 | Status: DISCONTINUED | OUTPATIENT
Start: 2024-05-03 | End: 2024-05-04

## 2024-05-03 RX ORDER — LANOLIN ALCOHOL/MO/W.PET/CERES
5 CREAM (GRAM) TOPICAL ONCE
Refills: 0 | Status: COMPLETED | OUTPATIENT
Start: 2024-05-03 | End: 2024-05-03

## 2024-05-03 RX ORDER — HYDROXYZINE HCL 10 MG
25 TABLET ORAL AT BEDTIME
Refills: 0 | Status: DISCONTINUED | OUTPATIENT
Start: 2024-05-03 | End: 2024-05-05

## 2024-05-03 RX ORDER — HYDROMORPHONE HYDROCHLORIDE 2 MG/ML
0.2 INJECTION INTRAMUSCULAR; INTRAVENOUS; SUBCUTANEOUS EVERY 4 HOURS
Refills: 0 | Status: DISCONTINUED | OUTPATIENT
Start: 2024-05-03 | End: 2024-05-05

## 2024-05-03 RX ORDER — ALPRAZOLAM 0.25 MG
0.25 TABLET ORAL EVERY 8 HOURS
Refills: 0 | Status: DISCONTINUED | OUTPATIENT
Start: 2024-05-03 | End: 2024-05-05

## 2024-05-03 RX ADMIN — Medication 1000 MILLIGRAM(S): at 02:20

## 2024-05-03 RX ADMIN — DULOXETINE HYDROCHLORIDE 60 MILLIGRAM(S): 30 CAPSULE, DELAYED RELEASE ORAL at 12:00

## 2024-05-03 RX ADMIN — SODIUM CHLORIDE 75 MILLILITER(S): 9 INJECTION, SOLUTION INTRAVENOUS at 05:37

## 2024-05-03 RX ADMIN — Medication 3 MILLILITER(S): at 01:27

## 2024-05-03 RX ADMIN — SIMETHICONE 80 MILLIGRAM(S): 80 TABLET, CHEWABLE ORAL at 12:00

## 2024-05-03 RX ADMIN — Medication 3 MILLILITER(S): at 13:47

## 2024-05-03 RX ADMIN — Medication 400 MILLIGRAM(S): at 06:34

## 2024-05-03 RX ADMIN — Medication 15 MILLIGRAM(S): at 12:00

## 2024-05-03 RX ADMIN — SIMETHICONE 80 MILLIGRAM(S): 80 TABLET, CHEWABLE ORAL at 05:36

## 2024-05-03 RX ADMIN — Medication 3 MILLILITER(S): at 19:53

## 2024-05-03 RX ADMIN — Medication 63.75 MILLIMOLE(S): at 00:15

## 2024-05-03 RX ADMIN — Medication 25 MILLIGRAM(S): at 17:26

## 2024-05-03 RX ADMIN — PIPERACILLIN AND TAZOBACTAM 25 GRAM(S): 4; .5 INJECTION, POWDER, LYOPHILIZED, FOR SOLUTION INTRAVENOUS at 14:00

## 2024-05-03 RX ADMIN — Medication 0.25 MILLIGRAM(S): at 22:47

## 2024-05-03 RX ADMIN — Medication 15 MILLIGRAM(S): at 12:15

## 2024-05-03 RX ADMIN — ENOXAPARIN SODIUM 40 MILLIGRAM(S): 100 INJECTION SUBCUTANEOUS at 12:00

## 2024-05-03 RX ADMIN — Medication 5 MILLIGRAM(S): at 01:24

## 2024-05-03 RX ADMIN — PIPERACILLIN AND TAZOBACTAM 25 GRAM(S): 4; .5 INJECTION, POWDER, LYOPHILIZED, FOR SOLUTION INTRAVENOUS at 05:36

## 2024-05-03 RX ADMIN — Medication 20 MILLIGRAM(S): at 08:31

## 2024-05-03 RX ADMIN — HYDROMORPHONE HYDROCHLORIDE 0.2 MILLIGRAM(S): 2 INJECTION INTRAMUSCULAR; INTRAVENOUS; SUBCUTANEOUS at 23:16

## 2024-05-03 RX ADMIN — Medication 3 MILLILITER(S): at 07:32

## 2024-05-03 RX ADMIN — PIPERACILLIN AND TAZOBACTAM 25 GRAM(S): 4; .5 INJECTION, POWDER, LYOPHILIZED, FOR SOLUTION INTRAVENOUS at 22:47

## 2024-05-03 RX ADMIN — Medication 10 MILLIGRAM(S): at 22:47

## 2024-05-03 RX ADMIN — Medication 0.25 MILLIGRAM(S): at 14:00

## 2024-05-03 RX ADMIN — BUPROPION HYDROCHLORIDE 150 MILLIGRAM(S): 150 TABLET, EXTENDED RELEASE ORAL at 12:00

## 2024-05-03 RX ADMIN — SIMETHICONE 80 MILLIGRAM(S): 80 TABLET, CHEWABLE ORAL at 17:26

## 2024-05-03 RX ADMIN — Medication 15 MILLIGRAM(S): at 19:06

## 2024-05-03 RX ADMIN — HEPARIN SODIUM 7500 UNIT(S): 5000 INJECTION INTRAVENOUS; SUBCUTANEOUS at 05:36

## 2024-05-03 RX ADMIN — SIMETHICONE 80 MILLIGRAM(S): 80 TABLET, CHEWABLE ORAL at 01:51

## 2024-05-03 RX ADMIN — Medication 15 MILLIGRAM(S): at 18:51

## 2024-05-03 RX ADMIN — CHLORHEXIDINE GLUCONATE 1 APPLICATION(S): 213 SOLUTION TOPICAL at 05:37

## 2024-05-03 RX ADMIN — Medication 0.25 MILLIGRAM(S): at 08:47

## 2024-05-03 RX ADMIN — PANTOPRAZOLE SODIUM 40 MILLIGRAM(S): 20 TABLET, DELAYED RELEASE ORAL at 17:26

## 2024-05-03 RX ADMIN — Medication 5 MILLIGRAM(S): at 23:27

## 2024-05-03 RX ADMIN — Medication 400 MILLIGRAM(S): at 01:50

## 2024-05-03 RX ADMIN — Medication 20 MILLIGRAM(S): at 18:50

## 2024-05-03 RX ADMIN — HYDROMORPHONE HYDROCHLORIDE 0.2 MILLIGRAM(S): 2 INJECTION INTRAMUSCULAR; INTRAVENOUS; SUBCUTANEOUS at 23:01

## 2024-05-03 RX ADMIN — SIMETHICONE 80 MILLIGRAM(S): 80 TABLET, CHEWABLE ORAL at 23:27

## 2024-05-03 RX ADMIN — SODIUM CHLORIDE 75 MILLILITER(S): 9 INJECTION, SOLUTION INTRAVENOUS at 08:32

## 2024-05-03 RX ADMIN — Medication 25 MILLIGRAM(S): at 03:55

## 2024-05-03 NOTE — PROGRESS NOTE ADULT - ASSESSMENT
ASSESSMENT:  58y F w/ PMHx of  obesity (BMI 43), GERD, RAFA (CPAP), HTN, ASTHMA admitted for elective gastric bypass and hiatal hernia repair, POD 2, Hosp day 3. Now is POD 0 for RTOR for diagnostic lap with JJ anastomosis revision, JASON, and upper endoscopic release of SBO.    PLAN:   - F/u chest x-ray  - Monitor respiratory status   - Continue with Zosyn   - pain control   - NPO   - Monitor UOP   - Strict I & Os   - Hemodynamic monitoring   - DVT/GI ppx   - Rest of care per SICU    BLUE TEAM SPECTRA: [id number]

## 2024-05-03 NOTE — PROGRESS NOTE ADULT - SUBJECTIVE AND OBJECTIVE BOX
SARAH BERGMAN   [id number]   [birth year]  58yF    ============================   SICU Consult for hemodynamic instability requiring vasopressor support    HPI   57 y/o female BMI 42.4 PMH HTN, RAFA (not on home CPAP/BiPAP) GERD, pseudogout, migraines, renal stones, depression, asthma; PSH bladder repair, cholecystectomy. On 4/29/24, patient underwent an elective robotic-assisted hiatal herniorraphy w/ gastric bypass. On 4/30, patient was having abdominal pain and vomiting with a concern for possible aspiration. Patient underwent CT abdomen and pelvis showed a narrowed JJ anastomosis. On 4/31, patient was taken back to the OR for a diagnostic laparoscopy and found a narrowed jejunojejunal anastomosis, released and revised w/ negative leak test.    This morning, patient was found to by hypotensive and tachycardic on 4C by blue team. SICU consulted for resuscitation and hemodynamic instabililty. Patient was seen bedside on 4C. Patient is a/o x3, systolic BP in the 80s (improving to the 90s with fluid boluses), -110 sinus rhythm, SpO2 99% on NRFM at 15 LPM. Patient reports lower back pain and lower abdominal pain. Patient denies chest pain, dyspnea (although full inspiratory effort is limited by abdominal pain), nausea, vomiting, lightheadedness, dizziness.    Blue team bedside and says that current abdominal exam is her baseline and has not changed since post-op.       24 Hour Events    05/02  NIGHT  - BiPAP 16/8 50% FiO2, switched to HFNC 30L 50% for 2 hours, back to bipap overnight  - Pt comfortable, no acute distress. abd soft/nt/nd; laparscopic incisions secrued with dermabond, c/d/i. LLQ CECE serosang.   - 15 [place]    DAY  -weaned Francesco 0.2 --> now off  -CPAP 5, HF alternating today  -pass flatus before advancing diet, ok for ice chips   -decr IVF 100cc/hr   -heparin 7500 q8 for BMI >40  -MRSA negative   -liquid Tylenol, oral protonix   -OOB today  -incr zosyn dose   - avoid sedating pain meds  - keep majority on Bipap, then HFNC for 30 mins if due for PO meds      [X] A ten-point review of systems was otherwise negative except as noted above.  [  ] Due to altered mental status/intubation, subjective information was not attained from the patient. History was obtained, to the extent possible, from review of the chart and collateral sources of information.  ================================================================== SARAH BERGMAN   [id number]   [birth year]  58yF    ============================   SICU Consult for hemodynamic instability requiring vasopressor support    HPI   57 y/o female BMI 42.4 PMH HTN, RAFA (not on home CPAP/BiPAP) GERD, pseudogout, migraines, renal stones, depression, asthma; PSH bladder repair, cholecystectomy. On 4/29/24, patient underwent an elective robotic-assisted hiatal herniorraphy w/ gastric bypass. On 4/30, patient was having abdominal pain and vomiting with a concern for possible aspiration. Patient underwent CT abdomen and pelvis showed a narrowed JJ anastomosis. On 4/31, patient was taken back to the OR for a diagnostic laparoscopy and found a narrowed jejunojejunal anastomosis, released and revised w/ negative leak test.    This morning, patient was found to by hypotensive and tachycardic on 4C by blue team. SICU consulted for resuscitation and hemodynamic instabililty. Patient was seen bedside on 4C. Patient is a/o x3, systolic BP in the 80s (improving to the 90s with fluid boluses), -110 sinus rhythm, SpO2 99% on NRFM at 15 LPM. Patient reports lower back pain and lower abdominal pain. Patient denies chest pain, dyspnea (although full inspiratory effort is limited by abdominal pain), nausea, vomiting, lightheadedness, dizziness.    Blue team bedside and says that current abdominal exam is her baseline and has not changed since post-op.       24 Hour Events    05/02  NIGHT  - BiPAP 16/8 50% FiO2, switched to HFNC 30L 50% for 2 hours, back to bipap overnight  - Pt comfortable, no acute distress. abd soft/nt/nd; laparscopic incisions secrued with dermabond, c/d/i. LLQ CECE serosang.   - 15 [place]    DAY  -weaned Francesco 0.2 --> now off  -CPAP 5, HF alternating today  -pass flatus before advancing diet, ok for ice chips   -decr IVF 100cc/hr   -heparin 7500 q8 for BMI >40  -MRSA negative   -liquid Tylenol, oral protonix   -OOB today  -incr zosyn dose   - avoid sedating pain meds  - keep majority on Bipap, then HFNC for 30 mins if due for PO meds      [X] A ten-point review of systems was otherwise negative except as noted above.  [  ] Due to altered mental status/intubation, subjective information was not attained from the patient. History was obtained, to the extent possible, from review of the chart and collateral sources of information.    Daily     Daily     Diet, NPO:   Except Medications (05-01-24 @ 11:40)      CURRENT MEDS:  Neurologic Medications  amitriptyline 10 milliGRAM(s) Oral at bedtime  buPROPion XL (24-Hour) . 150 milliGRAM(s) Oral daily  DULoxetine 60 milliGRAM(s) Oral daily  melatonin 5 milliGRAM(s) Oral at bedtime  ondansetron Injectable 4 milliGRAM(s) IV Push every 6 hours PRN Nausea and/or Vomiting    Respiratory Medications  albuterol    90 MICROgram(s) HFA Inhaler 2 Puff(s) Inhalation every 6 hours PRN Shortness of Breath and/or Wheezing  albuterol/ipratropium for Nebulization 3 milliLiter(s) Nebulizer every 6 hours  sodium chloride 3%  Inhalation 4 milliLiter(s) Inhalation every 12 hours    Cardiovascular Medications  furosemide   Injectable 20 milliGRAM(s) IV Push once    Gastrointestinal Medications  hyoscyamine SL 0.125 milliGRAM(s) SubLingual every 4 hours PRN Gastric Spasms  lactated ringers. 1000 milliLiter(s) IV Continuous <Continuous>  pantoprazole  Injectable 40 milliGRAM(s) IV Push every 24 hours  simethicone 80 milliGRAM(s) Chew four times a day      Hematologic/Oncologic Medications  heparin   Injectable 7500 Unit(s) SubCutaneous every 8 hours    Antimicrobial/Immunologic Medications  piperacillin/tazobactam IVPB.. 4.5 Gram(s) IV Intermittent every 8 hours    Topical/Other Medications  chlorhexidine 2% Cloths 1 Application(s) Topical <User Schedule>      ICU Vital Signs Last 24 Hrs  T(C): 36.2 (03 May 2024 08:03), Max: 36.7 (02 May 2024 12:00)  T(F): 97.2 (03 May 2024 08:03), Max: 98.1 (02 May 2024 12:00)  HR: 89 (03 May 2024 07:00) (82 - 103)  BP: 142/78 (03 May 2024 07:00) (88/53 - 148/80)  BP(mean): 104 (03 May 2024 07:00) (66 - 105)  RR: 43 (03 May 2024 07:00) (22 - 53)  SpO2: 95% (03 May 2024 07:00) (90% - 99%)    O2 Parameters below as of 03 May 2024 06:34  Patient On (Oxygen Delivery Method): nasal cannula, high flow  O2 Flow (L/min): 35  O2 Concentration (%): 70          ABG - ( 02 May 2024 06:13 )  pH, Arterial: 7.43  pH, Blood: x     /  pCO2: 44    /  pO2: 80    / HCO3: 29    / Base Excess: 4.2   /  SaO2: 98.6                I&O's Summary    02 May 2024 07:01  -  03 May 2024 07:00  --------------------------------------------------------  IN: 2767.4 mL / OUT: 1670 mL / NET: 1097.4 mL      I&O's Detail    02 May 2024 07:01  -  03 May 2024 07:00  --------------------------------------------------------  IN:    IV PiggyBack: 550 mL    IV PiggyBack: 300 mL    Lactated Ringers: 1850 mL    Oral Fluid: 30 mL    Phenylephrine: 37.4 mL  Total IN: 2767.4 mL    OUT:    Bulb (mL): 120 mL    Voided (mL): 1550 mL  Total OUT: 1670 mL    Total NET: 1097.4 mL          PHYSICAL EXAM:    General/Neuro  RASS:             GCS: 15  alert & oriented x 3, no focal deficits  Pupils: equal and reactive, anxious    Lungs:   Rales bilaterally, A-P Tachypnea    Cardiovascular : S1, S2.  Regular rate and rhythm.  Peripheral edema +2  Cardiac Rhythm: Normal Sinus Rhythm    GI: Abdomen soft, Non-tender, Non-distended.    Wound: clean, dry and intact  BS present  +flatus, + BM this am    Extremities: Extremities warm, pink, well-perfused.  CINTRON    Derm: Good skin turgor, no skin breakdown.      :   Primafit place      CXR: b/l patchy infiltrates- improving    LABS:  CAPILLARY BLOOD GLUCOSE  POCT Blood Glucose.: 116 mg/dL (03 May 2024 05:33)  POCT Blood Glucose.: 106 mg/dL (02 May 2024 18:19)  POCT Blood Glucose.: 108 mg/dL (02 May 2024 11:49)                          10.0   11.65 )-----------( 197      ( 02 May 2024 21:45 )             32.8       05-02    141  |  105  |  11  ----------------------------<  95  4.1   |  28  |  0.6<L>    Ca    8.2<L>      02 May 2024 21:45  Phos  2.4     05-02  Mg     2.2     05-02          Urinalysis Basic - ( 02 May 2024 21:45 )    Color: x / Appearance: x / SG: x / pH: x  Gluc: 95 mg/dL / Ketone: x  / Bili: x / Urobili: x   Blood: x / Protein: x / Nitrite: x   Leuk Esterase: x / RBC: x / WBC x   Sq Epi: x / Non Sq Epi: x / Bacteria: x          ==================================================================

## 2024-05-03 NOTE — CONSULT NOTE ADULT - SUBJECTIVE AND OBJECTIVE BOX
Patient is a 58y old  Female who presents with a chief complaint of Bariatric surgery (30 Apr 2024 08:28)      HPI:      PAST MEDICAL & SURGICAL HISTORY:  Asthma      Depression      GERD (gastroesophageal reflux disease)      Neck pain      Back pain      HTN (hypertension)      RAFA (obstructive sleep apnea)      Pseudogout      Renal stones      Migraines      Severe obesity (BMI >= 40)      History of cholecystectomy      H/O bladder repair surgery      Renal stones      H/O removal of cyst          PREVIOUS DIAGNOSTIC TESTING:      ECHO  FINDINGS:    STRESS  FINDINGS:    CATHETERIZATION  FINDINGS:    MEDICATIONS  (STANDING):  albuterol/ipratropium for Nebulization 3 milliLiter(s) Nebulizer every 6 hours  ALPRAZolam 0.25 milliGRAM(s) Oral every 8 hours  amitriptyline 10 milliGRAM(s) Oral at bedtime  buPROPion XL (24-Hour) . 150 milliGRAM(s) Oral daily  chlorhexidine 2% Cloths 1 Application(s) Topical <User Schedule>  DULoxetine 60 milliGRAM(s) Oral daily  enoxaparin Injectable 40 milliGRAM(s) SubCutaneous every 24 hours  melatonin 5 milliGRAM(s) Oral at bedtime  pantoprazole  Injectable 40 milliGRAM(s) IV Push every 24 hours  piperacillin/tazobactam IVPB.. 4.5 Gram(s) IV Intermittent every 8 hours  simethicone 80 milliGRAM(s) Chew four times a day  sodium chloride 3%  Inhalation 4 milliLiter(s) Inhalation every 12 hours    MEDICATIONS  (PRN):  albuterol    90 MICROgram(s) HFA Inhaler 2 Puff(s) Inhalation every 6 hours PRN Shortness of Breath and/or Wheezing  HYDROmorphone  Injectable 0.2 milliGRAM(s) IV Push every 4 hours PRN Severe Pain (7 - 10)  hydrOXYzine hydrochloride Injectable 25 milliGRAM(s) IntraMuscular at bedtime PRN Anxiety  hyoscyamine SL 0.125 milliGRAM(s) SubLingual every 4 hours PRN Gastric Spasms  ketorolac   Injectable 15 milliGRAM(s) IV Push every 6 hours PRN Moderate Pain (4 - 6)  ondansetron Injectable 4 milliGRAM(s) IV Push every 6 hours PRN Nausea and/or Vomiting      FAMILY HISTORY:  FH: HTN (hypertension) (Father)    Family history of blood clots (Sibling)        SOCIAL HISTORY:  CIGARETTES:    ALCOHOL:    REVIEW OF SYSTEMS:  CONSTITUTIONAL: No fever, weight loss, or fatigue  NECK: No pain or stiffness  RESPIRATORY: No cough, wheezing, chills or hemoptysis; No shortness of breath  CARDIOVASCULAR: No chest pain, palpitations, dizziness, or leg swelling  GASTROINTESTINAL: No abdominal or epigastric pain. No nausea, vomiting, or hematemesis; No diarrhea or constipation. No melena or hematochezia.  GENITOURINARY: No dysuria, frequency, hematuria, or incontinence  NEUROLOGICAL: No headaches, memory loss, loss of strength, numbness, or tremors  SKIN: No itching, burning, rashes, or lesions   ENDOCRINE: No heat or cold intolerance; No hair loss  MUSCULOSKELETAL: No joint pain or swelling; No muscle, back, or extremity pain  HEME/LYMPH: No easy bruising, or bleeding gums          Vital Signs Last 24 Hrs  T(C): 36.6 (03 May 2024 20:00), Max: 36.8 (03 May 2024 16:00)  T(F): 97.9 (03 May 2024 20:00), Max: 98.2 (03 May 2024 16:00)  HR: 100 (03 May 2024 21:36) (89 - 103)  BP: 120/66 (03 May 2024 21:00) (112/65 - 148/67)  BP(mean): 87 (03 May 2024 21:00) (83 - 108)  RR: 50 (03 May 2024 21:00) (37 - 56)  SpO2: 96% (03 May 2024 21:36) (91% - 98%)    Parameters below as of 03 May 2024 21:00  Patient On (Oxygen Delivery Method): BiPAP/CPAP            PHYSICAL EXAM:  GENERAL: NAD, well-groomed, well-developed  HEAD:  Atraumatic, Normocephalic  NECK: Supple, No JVD, Normal thyroid  NERVOUS SYSTEM:  Alert & Oriented X3, Good concentration  CHEST/LUNG: Clear to percussion bilaterally; No rales, rhonchi, wheezing, or rubs  HEART: Regular rate and rhythm; No murmurs, rubs, or gallops  ABDOMEN: Soft, Nontender, Nondistended; Bowel sounds present  EXTREMITIES:  2+ Peripheral Pulses, No clubbing, cyanosis, or edema  SKIN: No rashes or lesions    INTERPRETATION OF TELEMETRY:    ECG:    I&O's Detail    02 May 2024 07:01  -  03 May 2024 07:00  --------------------------------------------------------  IN:    IV PiggyBack: 550 mL    IV PiggyBack: 300 mL    Lactated Ringers: 1850 mL    Oral Fluid: 30 mL    Phenylephrine: 37.4 mL  Total IN: 2767.4 mL    OUT:    Bulb (mL): 120 mL    Voided (mL): 1550 mL  Total OUT: 1670 mL    Total NET: 1097.4 mL      03 May 2024 07:01  -  03 May 2024 22:34  --------------------------------------------------------  IN:    IV PiggyBack: 100 mL    Lactated Ringers: 75 mL    Oral Fluid: 270 mL  Total IN: 445 mL    OUT:    Bulb (mL): 80 mL    Voided (mL): 3050 mL  Total OUT: 3130 mL    Total NET: -2685 mL          LABS:                        10.0   11.65 )-----------( 197      ( 02 May 2024 21:45 )             32.8     05-03    138  |  100  |  12  ----------------------------<  104<H>  4.1   |  27  |  0.6<L>    Ca    8.1<L>      03 May 2024 17:58  Phos  2.6     05-03  Mg     2.0     05-03            Urinalysis Basic - ( 03 May 2024 17:58 )    Color: x / Appearance: x / SG: x / pH: x  Gluc: 104 mg/dL / Ketone: x  / Bili: x / Urobili: x   Blood: x / Protein: x / Nitrite: x   Leuk Esterase: x / RBC: x / WBC x   Sq Epi: x / Non Sq Epi: x / Bacteria: x      I&O's Summary    02 May 2024 07:01  -  03 May 2024 07:00  --------------------------------------------------------  IN: 2767.4 mL / OUT: 1670 mL / NET: 1097.4 mL    03 May 2024 07:01  -  03 May 2024 22:34  --------------------------------------------------------  IN: 445 mL / OUT: 3130 mL / NET: -2685 mL        RADIOLOGY & ADDITIONAL STUDIES:          
SARAH BERGMAN   [id number]   [birth year]  58yF    ============================   SICU Consult for hemodynamic instability requiring vasopressor support     24 Hour Events  -Admission under SICU service    ============================       HPI   59 y/o female BMI 42.4 PMH HTN, RAFA (not on home CPAP/BiPAP) GERD, pseudogout, migraines, renal stones, depression, asthma; PSH bladder repair, cholecystectomy. On 4/29/24, patient underwent an elective robotic-assisted hiatal herniorraphy w/ gastric bypass. On 4/30, patient was having abdominal pain and vomiting with a concern for possible aspiration. Patient underwent CT abdomen and pelvis showed a narrowed JJ anastomosis. On 4/31, patient was taken back to the OR for a diagnostic laparoscopy and found a narrowed jejunojejunal anastomosis, released and revised w/ negative leak test.    This morning, patient was found to by hypotensive and tachycardic on 4C by blue team. SICU consulted for resuscitation and hemodynamic instabililty. Patient was seen bedside on 4C. Patient is a/o x3, systolic BP in the 80s (improving to the 90s with fluid boluses), -110 sinus rhythm, SpO2 99% on NRFM at 15 LPM. Patient reports lower back pain and lower abdominal pain. Patient denies chest pain, dyspnea (although full inspiratory effort is limited by abdominal pain), nausea, vomiting, lightheadedness, dizziness.    Blue team bedside and says that current abdominal exam is her baseline and has not changed since post-op.      [X] A ten-point review of systems was otherwise negative except as noted above.  [  ] Due to altered mental status/intubation, subjective information was not attained from the patient. History was obtained, to the extent possible, from review of the chart and collateral sources of information.    Past Medical History and Surgical History  Asthma    Depression    GERD (gastroesophageal reflux disease)    Neck pain    Back pain    HTN (hypertension)    RAFA (obstructive sleep apnea)    Pseudogout    Renal stones    Migraines    Severe obesity (BMI >= 40)        Home Medications:  acetaminophen 325 mg oral tablet: 2 tab(s) orally every 6 hours  amitriptyline 10 mg oral tablet: 1 tab(s) orally once a day (at bedtime)  buPROPion 150 mg/24 hours (XL) oral tablet, extended release: 1 tab(s) orally once a day  DULoxetine 60 mg oral delayed release capsule: 1 cap(s) orally 2 times a day  famotidine 40 mg oral tablet: 1 tab(s) orally once a day  loratadine 10 mg oral tablet: 1 tab(s) orally once a day  losartan 50 mg oral tablet: 1 tab(s) orally once a day (at bedtime)  montelukast 10 mg oral tablet: 1 tab(s) orally once a day (at bedtime)  oxycodone-acetaminophen 7.5 mg-325 mg oral tablet: 1 tab(s) orally 3 times a day as needed for prn back/neck pain  pantoprazole 20 mg oral delayed release tablet: 1 tab(s) orally once a day (at bedtime)  spironolactone 50 mg oral tablet: 1 tab(s) orally once a day  Ubrelvy 100 mg oral tablet: 1 tab(s) orally as needed for migraine     Allergies  Allergies    contrast media (gadolinium-based) (Rash)    Intolerances       Current Medications:  acetaminophen     Tablet .. 975 milliGRAM(s) Oral every 6 hours  albuterol    90 MICROgram(s) HFA Inhaler 2 Puff(s) Inhalation every 6 hours PRN Shortness of Breath and/or Wheezing  amitriptyline 10 milliGRAM(s) Oral at bedtime  buPROPion XL (24-Hour) . 150 milliGRAM(s) Oral daily  cefoTEtan  IVPB 2 Gram(s) IV Intermittent every 12 hours  chlorhexidine 2% Cloths 1 Application(s) Topical <User Schedule>  DULoxetine 60 milliGRAM(s) Oral daily  gabapentin 100 milliGRAM(s) Oral every 8 hours  heparin   Injectable 5000 Unit(s) SubCutaneous every 8 hours  HYDROmorphone  Injectable 0.5 milliGRAM(s) IV Push every 4 hours PRN Severe Pain (7 - 10)  hyoscyamine SL 0.125 milliGRAM(s) SubLingual every 4 hours PRN Gastric Spasms  lactated ringers. 1000 milliLiter(s) (200 mL/Hr) IV Continuous <Continuous>  magnesium sulfate  IVPB 2 Gram(s) IV Intermittent every 2 hours  methocarbamol 500 milliGRAM(s) Oral every 6 hours  ondansetron Injectable 4 milliGRAM(s) IV Push every 6 hours PRN Nausea and/or Vomiting  pantoprazole  Injectable 40 milliGRAM(s) IV Push every 24 hours  phenylephrine    Infusion 0.1 MICROgram(s)/kG/Min (4.2 mL/Hr) IV Continuous <Continuous>  simethicone 80 milliGRAM(s) Chew four times a day  traMADol 25 milliGRAM(s) Oral every 8 hours PRN Moderate Pain (4 - 6)      Vital Sings, Intake/Output (Last 24Hours)  ICU Vital Signs Last 24 Hrs  T(C): 36.7 (01 May 2024 12:00), Max: 37.1 (01 May 2024 09:30)  T(F): 98 (01 May 2024 12:00), Max: 98.7 (01 May 2024 09:30)  HR: 101 (01 May 2024 13:30) (78 - 107)  BP: 92/51 (01 May 2024 13:15) (67/30 - 185/94)  BP(mean): 68 (01 May 2024 13:15) (44 - 103)  ABP: --  ABP(mean): --  RR: 27 (01 May 2024 13:30) (14 - 35)  SpO2: 96% (01 May 2024 13:30) (88% - 100%)    O2 Parameters below as of 01 May 2024 09:30  Patient On (Oxygen Delivery Method): mask, nonrebreather  O2 Flow (L/min): 15        I&O's Summary    30 Apr 2024 07:01  -  01 May 2024 07:00  --------------------------------------------------------  IN: 2522.5 mL / OUT: 1025 mL / NET: 1497.5 mL    01 May 2024 07:01  -  01 May 2024 13:49  --------------------------------------------------------  IN: 1700 mL / OUT: 150 mL / NET: 1550 mL        Physical Exam:  ----------------------------------------------------------------------------------------------------------   GCS:    15  Exam: A&Ox3, no focal deficits, moving all bilateral extremities, sensation intact bilaterally, strong bilateral  strength bilaterally    RESPIRATORY:  Normal expansion/effort, lung sounds present bilaterally, some scattered crackles to auscultation bilaterally, SpO2 99% on NRFM.    CARDIOVASCULAR:  Sinus tachycardia  EKG: Sinus tachycardia; QTc 441    GASTROINTESTINAL:  Abdomen soft, mild tenderness around surgical site incisions, some tenderness to lower abdomen below umbilicus. LLQ CECE drain serosanguinous.    MUSCULOSKELETAL:  Extremities warm, pink, well-perfused.    DERM:  No skin breakdown     Tubes/Lines/Drains   ----------------------------------------------------------------------------------------------------------  - PIVs  - Bilateral cephalic vein midlines

## 2024-05-03 NOTE — PROGRESS NOTE ADULT - ASSESSMENT
Assessment and Plan:	  58y Female s/p robotic-assisted hiatal herniorraphy w/ gastric bypass (4/29), diagnostic laparoscopy with released and revised JJ anastomosis narrowing w/ negative leak test (4/30).    NEUROLOGICAL:  #acute pain  - IV tylenol   - hold all sedating pain meds  #Depression  - continue home duloxetine  - continue home wellbutrin  - continue home amitriptyline    RESPIRATORY:   #aspiration pneumonia with hypoxic respiratory failure  - on BiPAP 16/8 50%  - On HFNC 40L/60% (only when getting PO meds)  - wean oxygen as tolerated  - Duonebs    CARDIOVASCULAR:   #Acute hypotension likely secondary to dehydration/ enalapril in AM  - if not resolving, then consider sepsis  - off nabil since 5/2  #HTN  - HOLDING home antihypertensives (losartan, spironolactone)  - Elevated troponemia (25 --> 40 --> 37 --> 37 ) --> no longer trending  - no EKG changes concerning for ACS  - likely 2/2 hypotension demand  - cardiology consult pending with her cardiologist Dr. Gomez    GASTROINTESTINAL/NUTRITION:   #s/p robotic-assisted hiatal herniorraphy w/ gastric bypass (4/29), diagnostic laparoscopy with released and revised JJ anastomosis narrowing w/ negative leak test (4/30)    - LLQ CECE drain with serosanguinous output  #Diet, NPO, except meds    - aspiration precautions    - head of bed 30 degrees  #GI Prophylaxis  - protonix IV  #Bowel regimen  - none   - simethicone QID  - hyoscyamine SL prn for gastric spasms    /RENAL:   #urine output in critically ill    -voiding  #KIKA  - resolving, creatinine improving  - non-oliguric    Labs:          BUN/Cr- 12/0.9  -->,  11/0.6  -->          Electrolytes-(05-02 @ 21:45)Na 141 // K 4.1 // Mg 2.2 // Phos 2.4   #IV fluids  - LR @ 75 cc/hr  #elevated lactate     - cleared       HEME/ONC:   #DVT prophylaxis  - switched to heparin subQ (avoid lovenox due to KIKA)  - SCDs    Labs: Hb/Hct:  10.4/34.2  -->,  10.0/32.8  -->                      Plts:  253  -->,  197  -->                 PTT/INR:      T&S Expires: 5/4    ID:  WBC- 19.75  --->>,  18.00  --->>,  11.65  --->>  Temp trend- 24hrs T(F): 98.1 (05-02 @ 20:00), Max: 98.1 (05-02 @ 12:00)  Antibiotics    - piperacillin/tazobactam IVPB.. 3.375 every 8 hours (5/2 to end 5/8) for aspiration pneumonia  Cultures:    - MRSA negative    ENDOCRINOLOGY:  - POCT fingersticks q6 hours while NPO  - Glucose goal 140-180    MSK:     Activity - Increase As Tolerated:     Time/Priority:  Routine (05-01-24 @ 10:11)      LINES/DRAINS:  PIVs, bilateral cephalic midlines (5/1)    ADVANCED DIRECTIVES:  Full Code    HCP/Emergency Contact-    INDICATION FOR SICU: hemodynamic instability requiring vasopressor support      DISPO:  SICU Assessment and Plan:	  58y Female s/p robotic-assisted hiatal herniorraphy w/ gastric bypass (4/29), diagnostic laparoscopy with released and revised JJ anastomosis narrowing w/ negative leak test (4/30).    NEUROLOGICAL:  #acute pain  - IV tylenol   - hold all sedating pain meds  #Depression  - continue home duloxetine  - continue home wellbutrin  - continue home amitriptyline    RESPIRATORY:   #aspiration pneumonia with hypoxic respiratory failure  - on BiPAP 16/8 50%  - On HFNC 40L/60% (only when getting PO meds)  - wean oxygen as tolerated  - Duonebs    CARDIOVASCULAR:   #Acute hypotension likely secondary to dehydration/ enalapril in AM  - if not resolving, then consider sepsis  - off nabil since 5/2  #HTN  - HOLDING home antihypertensives (losartan, spironolactone)  - Elevated troponemia (25 --> 40 --> 37 --> 37 ) --> no longer trending  - no EKG changes concerning for ACS  - likely 2/2 hypotension demand  - cardiology consult pending with her cardiologist Dr. Gomez    GASTROINTESTINAL/NUTRITION:   #s/p robotic-assisted hiatal herniorraphy w/ gastric bypass (4/29), diagnostic laparoscopy with released and revised JJ anastomosis narrowing w/ negative leak test (4/30)    - LLQ CECE drain with serosanguinous output  #Diet, NPO, except meds    - aspiration precautions    - head of bed 30 degrees  #GI Prophylaxis  - protonix IV  #Bowel regimen  - none   - simethicone QID  - hyoscyamine SL prn for gastric spasms    /RENAL:   #urine output in critically ill    -voiding  #KIKA  - resolving, creatinine improving  - non-oliguric  #IV fluids  - LR @ 75 cc/hr  #elevated lactate     - cleared      Labs:          BUN/Cr -  12/0.9  -->,  11/0.6  -->          Electrolytes-(05-02 @ 21:45)Na 141 // K 4.1 // Mg 2.2 // Phos 2.4        HEME/ONC:   #DVT prophylaxis  - switched to heparin subQ (avoid lovenox due to KIKA)  - SCDs      Labs: Hgb/Hct:  10.4/34.2  (05-02 @ 05:01)  -->,  10.0/32.8  (05-02 @ 21:45)  -->                      Platelets:  253  -->,  197  -->                 PTT/INR:        T&S Expires: 5/4    ID:  WBC -  19.75  --->>,  18.00  --->>,  11.65  --->>  Temp trend - 24hrs T(F): 97.3 (05-03 @ 00:00), Max: 98.1 (05-02 @ 12:00)  Current antibiotics - piperacillin/tazobactam IVPB.. 4.5 IV Intermittent every 8 hours, Stop order after: 6 Days    Cultures:    - MRSA negative    ENDOCRINOLOGY:  - POCT fingersticks q6 hours while NPO  - Glucose goal 140-180    MSK:     Activity - Increase As Tolerated:     Time/Priority:  Routine (05-01-24 @ 10:11)      LINES/DRAINS:  PIVs, bilateral cephalic midlines (5/1)    ADVANCED DIRECTIVES:  Full Code    HCP/Emergency Contact-    INDICATION FOR SICU: hemodynamic instability requiring vasopressor support      DISPO:  SICU Assessment and Plan:	  58y Female s/p robotic-assisted hiatal herniorraphy w/ gastric bypass (4/29), diagnostic laparoscopy with released and revised JJ anastomosis narrowing w/ negative leak test (4/30).    NEUROLOGICAL:  #acute pain  - IV tylenol   - hold all sedating pain meds  #Depression  - continue home duloxetine  - continue home wellbutrin  - continue home amitriptyline  #Anxiety  - atarax given overnight with good results      RESPIRATORY:   #aspiration pneumonia with hypoxic respiratory failure  - on BiPAP 16/8 50%  - On HFNC 35L/70% (only when getting PO meds)  - wean oxygen as tolerated  - Duonebs  -Pulmonary edema- Lasix 20 mg IV this am    CARDIOVASCULAR:   #Acute hypotension likely secondary to dehydration/ enalapril in AM  - Resolved, off pressors since noon on 5/2  - off nabil since 5/2  #HTN  - HOLDING home antihypertensives (losartan, spironolactone)  - Elevated troponemia (25 --> 40 --> 37 --> 37 ) --> no longer trending  - no EKG changes concerning for ACS  - likely 2/2 hypotension demand  - cardiology consult pending with her cardiologist Dr. Gomez    GASTROINTESTINAL/NUTRITION:   #s/p robotic-assisted hiatal herniorraphy w/ gastric bypass (4/29), diagnostic laparoscopy with released and revised JJ anastomosis narrowing w/ negative leak test (4/30)    - LLQ CECE drain with serosanguinous output  #Diet, NPO, except meds    - aspiration precautions    - head of bed 30 degrees  #GI Prophylaxis  - protonix IV  #Bowel regimen  - none   - simethicone QID  - hyoscyamine SL prn for gastric spasms  -+Flatus, +BM am 5/3    /RENAL:   #urine output in critically ill    -voiding  #KIKA  - resolved, creatinine back to baseline  - non-oliguric  #IV fluids  - LR @ 75 cc/hr--> consider IVL  #elevated lactate     - cleared      Labs:          BUN/Cr -  12/0.9  -->,  11/0.6  -->          Electrolytes-(05-02 @ 21:45)Na 141 // K 4.1 // Mg 2.2 // Phos 2.4        HEME/ONC:   #DVT prophylaxis  - switched to heparin subQ (avoid lovenox due to KIKA)-> can change to Lovenox since KIKA has resolved  - SCDs      Labs: Hgb/Hct:  10.4/34.2  (05-02 @ 05:01)  -->,  10.0/32.8  (05-02 @ 21:45)  -->                      Platelets:  253  -->,  197  -->                 PTT/INR:        T&S Expires: 5/4    ID:  WBC -  19.75  --->>,  18.00  --->>,  11.65  --->>  Temp trend - 24hrs T(F): 97.3 (05-03 @ 00:00), Max: 98.1 (05-02 @ 12:00)  Current antibiotics - piperacillin/tazobactam IVPB.. 4.5 IV Intermittent every 8 hours, Stop order after: 6 Days    Cultures:    - MRSA negative    ENDOCRINOLOGY:  - POCT fingersticks q6 hours while NPO  - Glucose 116    MSK:     Activity - Increase As Tolerated:     Time/Priority:  Routine (05-01-24 @ 10:11)      LINES/DRAINS:  PIVs, bilateral cephalic midlines (5/1)    ADVANCED DIRECTIVES:  Full Code      INDICATION FOR SICU: hemodynamic instability requiring vasopressor support      DISPO:  SICU

## 2024-05-03 NOTE — PROGRESS NOTE ADULT - SUBJECTIVE AND OBJECTIVE BOX
GENERAL SURGERY PROGRESS NOTE    Patient: SARAH BERGMAN , 58y ([birth year])Female   MRN: [id number]  Location: [address]  Visit: 04-29-24 Inpatient  Date: 05-03-24 @ 07:53    Hospital Day #: 5  Post-Op Day #: 4    Procedure/Dx/Injuries: Creation, bypass, gastric, laparoscopic, robot-assisted    Robot-assisted hiatal herniorrhaphy using da Madhuri Xi    Creation, bypass, gastric, laparoscopic, robot-assisted    Diagnostic laparoscopy    Revision of anastomosis of small intestine    Laparoscopic lysis of abdominal adhesions    Upper endoscopy    Release of small bowel obstruction      Events of past 24 hours: Patient seen and examined at bedside. Patient tolerating pain appropriately. Patient on BIPAP overnight. Incisions c/d/i. LLQ CECE output is serosang. Unable to sleep due to anxiety, SICU gave melatonin 5mg and IM hydroxyzine. Patient had small bowel movement overnight.     PAST MEDICAL & SURGICAL HISTORY:  Asthma      Depression      GERD (gastroesophageal reflux disease)      Neck pain      Back pain      HTN (hypertension)      RAFA (obstructive sleep apnea)      Pseudogout      Renal stones      Migraines      Severe obesity (BMI >= 40)      History of cholecystectomy      H/O bladder repair surgery      Renal stones      H/O removal of cyst          Vitals:   T(F): 97.3 (05-03-24 @ 00:00), Max: 98.1 (05-02-24 @ 12:00)  HR: 89 (05-03-24 @ 07:00)  BP: 142/78 (05-03-24 @ 07:00)  RR: 43 (05-03-24 @ 07:00)  SpO2: 95% (05-03-24 @ 07:00)      Diet, NPO:   Except Medications      Fluids: lactated ringers.: Solution, 1000 milliLiter(s) infuse at 75 mL/Hr  Provider's Contact #: [phone]      I & O's:    05-02-24 @ 07:01  -  05-03-24 @ 07:00  --------------------------------------------------------  IN:    IV PiggyBack: 550 mL    IV PiggyBack: 300 mL    Lactated Ringers: 1850 mL    Oral Fluid: 30 mL    Phenylephrine: 37.4 mL  Total IN: 2767.4 mL    OUT:    Bulb (mL): 120 mL    Voided (mL): 1550 mL  Total OUT: 1670 mL    Total NET: 1097.4 mL    PHYSICAL EXAM:  GENERAL: NAD, well-appearing  CHEST/LUNG: Clear to auscultation bilaterally  HEART: Regular rate and rhythm  ABDOMEN: Soft, Nontender, Nondistended; inscions c/d/i. LLQ CECE drain serosanguinous   EXTREMITIES:  No clubbing, cyanosis, or edema    MEDICATIONS  (STANDING):  albuterol/ipratropium for Nebulization 3 milliLiter(s) Nebulizer every 6 hours  amitriptyline 10 milliGRAM(s) Oral at bedtime  buPROPion XL (24-Hour) . 150 milliGRAM(s) Oral daily  chlorhexidine 2% Cloths 1 Application(s) Topical <User Schedule>  DULoxetine 60 milliGRAM(s) Oral daily  furosemide   Injectable 20 milliGRAM(s) IV Push once  heparin   Injectable 7500 Unit(s) SubCutaneous every 8 hours  lactated ringers. 1000 milliLiter(s) (75 mL/Hr) IV Continuous <Continuous>  melatonin 5 milliGRAM(s) Oral at bedtime  pantoprazole  Injectable 40 milliGRAM(s) IV Push every 24 hours  piperacillin/tazobactam IVPB.. 4.5 Gram(s) IV Intermittent every 8 hours  simethicone 80 milliGRAM(s) Chew four times a day  sodium chloride 3%  Inhalation 4 milliLiter(s) Inhalation every 12 hours    MEDICATIONS  (PRN):  albuterol    90 MICROgram(s) HFA Inhaler 2 Puff(s) Inhalation every 6 hours PRN Shortness of Breath and/or Wheezing  hyoscyamine SL 0.125 milliGRAM(s) SubLingual every 4 hours PRN Gastric Spasms  ondansetron Injectable 4 milliGRAM(s) IV Push every 6 hours PRN Nausea and/or Vomiting      DVT PROPHYLAXIS: heparin   Injectable 7500 Unit(s) SubCutaneous every 8 hours    GI PROPHYLAXIS: pantoprazole  Injectable 40 milliGRAM(s) IV Push every 24 hours    ANTICOAGULATION:   ANTIBIOTICS:  piperacillin/tazobactam IVPB.. 4.5 Gram(s)            LAB/STUDIES:  Labs:  CAPILLARY BLOOD GLUCOSE      POCT Blood Glucose.: 116 mg/dL (03 May 2024 05:33)  POCT Blood Glucose.: 106 mg/dL (02 May 2024 18:19)  POCT Blood Glucose.: 108 mg/dL (02 May 2024 11:49)                          10.0   11.65 )-----------( 197      ( 02 May 2024 21:45 )             32.8         05-02    141  |  105  |  11  ----------------------------<  95  4.1   |  28  |  0.6<L>      Calcium: 8.2 mg/dL (05-02-24 @ 21:45)      LFTs:     Blood Gas Arterial, Lactate: 1.5 mmol/L (05-02-24 @ 06:13)  Blood Gas Arterial, Lactate: 1.7 mmol/L (05-02-24 @ 00:18)  Blood Gas Arterial, Lactate: 2.2 mmol/L (05-01-24 @ 18:01)  Lactate, Blood: 3.3 mmol/L (05-01-24 @ 09:01)  Blood Gas Arterial, Lactate: 3.0 mmol/L (05-01-24 @ 08:58)    ABG - ( 02 May 2024 06:13 )  pH: 7.43  /  pCO2: 44    /  pO2: 80    / HCO3: 29    / Base Excess: 4.2   /  SaO2: 98.6            ABG - ( 02 May 2024 00:18 )  pH: 7.38  /  pCO2: 47    /  pO2: 60    / HCO3: 28    / Base Excess: 2.0   /  SaO2: 91.9            ABG - ( 01 May 2024 18:01 )  pH: 7.39  /  pCO2: 45    /  pO2: 64    / HCO3: 27    / Base Excess: 1.7   /  SaO2: 94.7              Coags:     12.90  ----< 1.13    ( 01 May 2024 09:01 )     28.7        CARDIAC MARKERS ( 01 May 2024 09:01 )  x     / x     / 275 U/L / x     / 4.7 ng/mL          Urinalysis Basic - ( 02 May 2024 21:45 )    Color: x / Appearance: x / SG: x / pH: x  Gluc: 95 mg/dL / Ketone: x  / Bili: x / Urobili: x   Blood: x / Protein: x / Nitrite: x   Leuk Esterase: x / RBC: x / WBC x   Sq Epi: x / Non Sq Epi: x / Bacteria: x         Dimensions-X Axis In Cm: 0.5

## 2024-05-04 LAB
ANION GAP SERPL CALC-SCNC: 11 MMOL/L — SIGNIFICANT CHANGE UP (ref 7–14)
ANION GAP SERPL CALC-SCNC: 12 MMOL/L — SIGNIFICANT CHANGE UP (ref 7–14)
BUN SERPL-MCNC: 12 MG/DL — SIGNIFICANT CHANGE UP (ref 10–20)
BUN SERPL-MCNC: 13 MG/DL — SIGNIFICANT CHANGE UP (ref 10–20)
CALCIUM SERPL-MCNC: 8 MG/DL — LOW (ref 8.4–10.5)
CALCIUM SERPL-MCNC: 8.5 MG/DL — SIGNIFICANT CHANGE UP (ref 8.4–10.5)
CHLORIDE SERPL-SCNC: 94 MMOL/L — LOW (ref 98–110)
CHLORIDE SERPL-SCNC: 98 MMOL/L — SIGNIFICANT CHANGE UP (ref 98–110)
CO2 SERPL-SCNC: 27 MMOL/L — SIGNIFICANT CHANGE UP (ref 17–32)
CO2 SERPL-SCNC: 31 MMOL/L — SIGNIFICANT CHANGE UP (ref 17–32)
CREAT SERPL-MCNC: 0.6 MG/DL — LOW (ref 0.7–1.5)
CREAT SERPL-MCNC: 0.6 MG/DL — LOW (ref 0.7–1.5)
EGFR: 104 ML/MIN/1.73M2 — SIGNIFICANT CHANGE UP
EGFR: 104 ML/MIN/1.73M2 — SIGNIFICANT CHANGE UP
GAS PNL BLDA: SIGNIFICANT CHANGE UP
GLUCOSE BLDC GLUCOMTR-MCNC: 109 MG/DL — HIGH (ref 70–99)
GLUCOSE BLDC GLUCOMTR-MCNC: 93 MG/DL — SIGNIFICANT CHANGE UP (ref 70–99)
GLUCOSE BLDC GLUCOMTR-MCNC: 96 MG/DL — SIGNIFICANT CHANGE UP (ref 70–99)
GLUCOSE BLDC GLUCOMTR-MCNC: 96 MG/DL — SIGNIFICANT CHANGE UP (ref 70–99)
GLUCOSE SERPL-MCNC: 118 MG/DL — HIGH (ref 70–99)
GLUCOSE SERPL-MCNC: 91 MG/DL — SIGNIFICANT CHANGE UP (ref 70–99)
HCT VFR BLD CALC: 32.5 % — LOW (ref 37–47)
HGB BLD-MCNC: 9.7 G/DL — LOW (ref 12–16)
MAGNESIUM SERPL-MCNC: 1.9 MG/DL — SIGNIFICANT CHANGE UP (ref 1.8–2.4)
MCHC RBC-ENTMCNC: 26.2 PG — LOW (ref 27–31)
MCHC RBC-ENTMCNC: 29.8 G/DL — LOW (ref 32–37)
MCV RBC AUTO: 87.8 FL — SIGNIFICANT CHANGE UP (ref 81–99)
NRBC # BLD: 0 /100 WBCS — SIGNIFICANT CHANGE UP (ref 0–0)
PHOSPHATE SERPL-MCNC: 2.5 MG/DL — SIGNIFICANT CHANGE UP (ref 2.1–4.9)
PLATELET # BLD AUTO: 204 K/UL — SIGNIFICANT CHANGE UP (ref 130–400)
PMV BLD: 11.6 FL — HIGH (ref 7.4–10.4)
POTASSIUM SERPL-MCNC: 3.8 MMOL/L — SIGNIFICANT CHANGE UP (ref 3.5–5)
POTASSIUM SERPL-MCNC: 3.9 MMOL/L — SIGNIFICANT CHANGE UP (ref 3.5–5)
POTASSIUM SERPL-SCNC: 3.8 MMOL/L — SIGNIFICANT CHANGE UP (ref 3.5–5)
POTASSIUM SERPL-SCNC: 3.9 MMOL/L — SIGNIFICANT CHANGE UP (ref 3.5–5)
RBC # BLD: 3.7 M/UL — LOW (ref 4.2–5.4)
RBC # FLD: 15.3 % — HIGH (ref 11.5–14.5)
SODIUM SERPL-SCNC: 136 MMOL/L — SIGNIFICANT CHANGE UP (ref 135–146)
SODIUM SERPL-SCNC: 137 MMOL/L — SIGNIFICANT CHANGE UP (ref 135–146)
WBC # BLD: 13.56 K/UL — HIGH (ref 4.8–10.8)
WBC # FLD AUTO: 13.56 K/UL — HIGH (ref 4.8–10.8)

## 2024-05-04 PROCEDURE — 71045 X-RAY EXAM CHEST 1 VIEW: CPT | Mod: 26

## 2024-05-04 PROCEDURE — 99291 CRITICAL CARE FIRST HOUR: CPT

## 2024-05-04 RX ORDER — DEXMEDETOMIDINE HYDROCHLORIDE IN 0.9% SODIUM CHLORIDE 4 UG/ML
0.5 INJECTION INTRAVENOUS
Qty: 400 | Refills: 0 | Status: DISCONTINUED | OUTPATIENT
Start: 2024-05-04 | End: 2024-05-04

## 2024-05-04 RX ORDER — FUROSEMIDE 40 MG
20 TABLET ORAL ONCE
Refills: 0 | Status: COMPLETED | OUTPATIENT
Start: 2024-05-04 | End: 2024-05-04

## 2024-05-04 RX ORDER — POTASSIUM CHLORIDE 20 MEQ
20 PACKET (EA) ORAL ONCE
Refills: 0 | Status: COMPLETED | OUTPATIENT
Start: 2024-05-04 | End: 2024-05-04

## 2024-05-04 RX ORDER — HYDROXYZINE HCL 10 MG
25 TABLET ORAL ONCE
Refills: 0 | Status: COMPLETED | OUTPATIENT
Start: 2024-05-04 | End: 2024-05-04

## 2024-05-04 RX ORDER — DEXMEDETOMIDINE HYDROCHLORIDE IN 0.9% SODIUM CHLORIDE 4 UG/ML
0.75 INJECTION INTRAVENOUS
Qty: 400 | Refills: 0 | Status: DISCONTINUED | OUTPATIENT
Start: 2024-05-04 | End: 2024-05-05

## 2024-05-04 RX ORDER — FUROSEMIDE 40 MG
20 TABLET ORAL DAILY
Refills: 0 | Status: DISCONTINUED | OUTPATIENT
Start: 2024-05-04 | End: 2024-05-04

## 2024-05-04 RX ORDER — FUROSEMIDE 40 MG
20 TABLET ORAL ONCE
Refills: 0 | Status: DISCONTINUED | OUTPATIENT
Start: 2024-05-04 | End: 2024-05-04

## 2024-05-04 RX ORDER — DEXMEDETOMIDINE HYDROCHLORIDE IN 0.9% SODIUM CHLORIDE 4 UG/ML
0.02 INJECTION INTRAVENOUS
Qty: 400 | Refills: 0 | Status: DISCONTINUED | OUTPATIENT
Start: 2024-05-04 | End: 2024-05-04

## 2024-05-04 RX ORDER — HYDROXYZINE HCL 10 MG
25 TABLET ORAL ONCE
Refills: 0 | Status: DISCONTINUED | OUTPATIENT
Start: 2024-05-04 | End: 2024-05-05

## 2024-05-04 RX ORDER — POTASSIUM PHOSPHATE, MONOBASIC POTASSIUM PHOSPHATE, DIBASIC 236; 224 MG/ML; MG/ML
15 INJECTION, SOLUTION INTRAVENOUS ONCE
Refills: 0 | Status: COMPLETED | OUTPATIENT
Start: 2024-05-04 | End: 2024-05-04

## 2024-05-04 RX ORDER — MAGNESIUM SULFATE 500 MG/ML
1 VIAL (ML) INJECTION ONCE
Refills: 0 | Status: COMPLETED | OUTPATIENT
Start: 2024-05-04 | End: 2024-05-04

## 2024-05-04 RX ORDER — DEXMEDETOMIDINE HYDROCHLORIDE IN 0.9% SODIUM CHLORIDE 4 UG/ML
0.05 INJECTION INTRAVENOUS
Qty: 400 | Refills: 0 | Status: DISCONTINUED | OUTPATIENT
Start: 2024-05-04 | End: 2024-05-04

## 2024-05-04 RX ADMIN — BUPROPION HYDROCHLORIDE 150 MILLIGRAM(S): 150 TABLET, EXTENDED RELEASE ORAL at 12:12

## 2024-05-04 RX ADMIN — Medication 200 MILLIGRAM(S): at 22:14

## 2024-05-04 RX ADMIN — SIMETHICONE 80 MILLIGRAM(S): 80 TABLET, CHEWABLE ORAL at 05:48

## 2024-05-04 RX ADMIN — PIPERACILLIN AND TAZOBACTAM 25 GRAM(S): 4; .5 INJECTION, POWDER, LYOPHILIZED, FOR SOLUTION INTRAVENOUS at 05:18

## 2024-05-04 RX ADMIN — Medication 3 MILLILITER(S): at 13:31

## 2024-05-04 RX ADMIN — Medication 0.25 MILLIGRAM(S): at 22:14

## 2024-05-04 RX ADMIN — PIPERACILLIN AND TAZOBACTAM 25 GRAM(S): 4; .5 INJECTION, POWDER, LYOPHILIZED, FOR SOLUTION INTRAVENOUS at 13:14

## 2024-05-04 RX ADMIN — Medication 3 MILLILITER(S): at 21:06

## 2024-05-04 RX ADMIN — DEXMEDETOMIDINE HYDROCHLORIDE IN 0.9% SODIUM CHLORIDE 14 MICROGRAM(S)/KG/HR: 4 INJECTION INTRAVENOUS at 10:41

## 2024-05-04 RX ADMIN — ENOXAPARIN SODIUM 40 MILLIGRAM(S): 100 INJECTION SUBCUTANEOUS at 11:56

## 2024-05-04 RX ADMIN — Medication 5 MILLIGRAM(S): at 22:14

## 2024-05-04 RX ADMIN — Medication 10 MILLIGRAM(S): at 22:14

## 2024-05-04 RX ADMIN — Medication 50 MILLIEQUIVALENT(S): at 13:14

## 2024-05-04 RX ADMIN — DULOXETINE HYDROCHLORIDE 60 MILLIGRAM(S): 30 CAPSULE, DELAYED RELEASE ORAL at 11:56

## 2024-05-04 RX ADMIN — Medication 0.25 MILLIGRAM(S): at 13:15

## 2024-05-04 RX ADMIN — CHLORHEXIDINE GLUCONATE 1 APPLICATION(S): 213 SOLUTION TOPICAL at 05:18

## 2024-05-04 RX ADMIN — Medication 25 MILLIGRAM(S): at 04:17

## 2024-05-04 RX ADMIN — Medication 20 MILLIGRAM(S): at 05:48

## 2024-05-04 RX ADMIN — Medication 3 MILLILITER(S): at 02:15

## 2024-05-04 RX ADMIN — SIMETHICONE 80 MILLIGRAM(S): 80 TABLET, CHEWABLE ORAL at 11:56

## 2024-05-04 RX ADMIN — POTASSIUM PHOSPHATE, MONOBASIC POTASSIUM PHOSPHATE, DIBASIC 63.75 MILLIMOLE(S): 236; 224 INJECTION, SOLUTION INTRAVENOUS at 03:57

## 2024-05-04 RX ADMIN — SIMETHICONE 80 MILLIGRAM(S): 80 TABLET, CHEWABLE ORAL at 23:14

## 2024-05-04 RX ADMIN — Medication 3 MILLILITER(S): at 08:06

## 2024-05-04 RX ADMIN — Medication 25 MILLIGRAM(S): at 02:17

## 2024-05-04 RX ADMIN — DEXMEDETOMIDINE HYDROCHLORIDE IN 0.9% SODIUM CHLORIDE 21 MICROGRAM(S)/KG/HR: 4 INJECTION INTRAVENOUS at 23:15

## 2024-05-04 RX ADMIN — Medication 20 MILLIGRAM(S): at 10:41

## 2024-05-04 RX ADMIN — PANTOPRAZOLE SODIUM 40 MILLIGRAM(S): 20 TABLET, DELAYED RELEASE ORAL at 17:47

## 2024-05-04 RX ADMIN — PIPERACILLIN AND TAZOBACTAM 25 GRAM(S): 4; .5 INJECTION, POWDER, LYOPHILIZED, FOR SOLUTION INTRAVENOUS at 22:15

## 2024-05-04 RX ADMIN — Medication 0.25 MILLIGRAM(S): at 06:12

## 2024-05-04 RX ADMIN — Medication 15 MILLIGRAM(S): at 12:02

## 2024-05-04 RX ADMIN — Medication 50 GRAM(S): at 03:44

## 2024-05-04 NOTE — PROGRESS NOTE ADULT - SUBJECTIVE AND OBJECTIVE BOX
SARAH BERGMAN   [id number]   [birth year]  58yF    ============================   SICU Consult for hemodynamic instability requiring vasopressor support    HPI   57 y/o female BMI 42.4 PMH HTN, RAFA (not on home CPAP/BiPAP) GERD, pseudogout, migraines, renal stones, depression, asthma; PSH bladder repair, cholecystectomy. On 4/29/24, patient underwent an elective robotic-assisted hiatal herniorraphy w/ gastric bypass. On 4/30, patient was having abdominal pain and vomiting with a concern for possible aspiration. Patient underwent CT abdomen and pelvis showed a narrowed JJ anastomosis. On 4/31, patient was taken back to the OR for a diagnostic laparoscopy and found a narrowed jejunojejunal anastomosis, released and revised w/ negative leak test.    This morning, patient was found to by hypotensive and tachycardic on 4C by blue team. SICU consulted for resuscitation and hemodynamic instabililty. Patient was seen bedside on 4C. Patient is a/o x3, systolic BP in the 80s (improving to the 90s with fluid boluses), -110 sinus rhythm, SpO2 99% on NRFM at 15 LPM. Patient reports lower back pain and lower abdominal pain. Patient denies chest pain, dyspnea (although full inspiratory effort is limited by abdominal pain), nausea, vomiting, lightheadedness, dizziness.    Blue team bedside and says that current abdominal exam is her baseline and has not changed since post-op.       24 Hour Events      [X] A ten-point review of systems was otherwise negative except as noted above.  [  ] Due to altered mental status/intubation, subjective information was not attained from the patient. History was obtained, to the extent possible, from review of the chart and collateral sources of information.  ================================================================== SARAH BERGMAN   [id number]   [birth year]  58yF    ============================   SICU Consult for hemodynamic instability requiring vasopressor support    HPI   59 y/o female BMI 42.4 PMH HTN, RAFA (not on home CPAP/BiPAP) GERD, pseudogout, migraines, renal stones, depression, asthma; PSH bladder repair, cholecystectomy. On 4/29/24, patient underwent an elective robotic-assisted hiatal herniorraphy w/ gastric bypass. On 4/30, patient was having abdominal pain and vomiting with a concern for possible aspiration. Patient underwent CT abdomen and pelvis showed a narrowed JJ anastomosis. On 4/31, patient was taken back to the OR for a diagnostic laparoscopy and found a narrowed jejunojejunal anastomosis, released and revised w/ negative leak test.    This morning, patient was found to by hypotensive and tachycardic on 4C by blue team. SICU consulted for resuscitation and hemodynamic instabililty. Patient was seen bedside on 4C. Patient is a/o x3, systolic BP in the 80s (improving to the 90s with fluid boluses), -110 sinus rhythm, SpO2 99% on NRFM at 15 LPM. Patient reports lower back pain and lower abdominal pain. Patient denies chest pain, dyspnea (although full inspiratory effort is limited by abdominal pain), nausea, vomiting, lightheadedness, dizziness.    Blue team bedside and says that current abdominal exam is her baseline and has not changed since post-op.       24 Hour Events    5/3  NIGHT  -A&O x3, on bipap, abdomen soft, nontender, drain serosang  -f/u 2330 labs  -consider redosing lasix 20 if decreased UOP    DAY   - Lasix 20 mg @8am, 2.25 liters UO, Lasix 20 mg at 5:30 pm   - Atarax at bedtime   - IVL   - switch from HSQ to Lovenox    - okay to give Toradol -appproved by Dr. Montoya (ordered 3 doses prn)   - added dilaudid 0.2 q4 PRN   - trial BiPAP 4 hrs in am, backon 545pm-  -4pm BMP  -Atarax 25 mg IM x 1 stat at 5pm  -Attempted to decrease inspiratory pressure to 12, didn't tolerate increase to bipap setting -14/8, rate 14 and 55%  -4pm potassium 4.1, Creat 0.6  at 6pm neg 2165 ml      [X] A ten-point review of systems was otherwise negative except as noted above.  [  ] Due to altered mental status/intubation, subjective information was not attained from the patient. History was obtained, to the extent possible, from review of the chart and collateral sources of information.  ================================================================== SARAH BERGMAN   [id number]   [birth year]  58yF    ============================   SICU Consult for hemodynamic instability requiring vasopressor support    HPI   59 y/o female BMI 42.4 PMH HTN, RAFA (not on home CPAP/BiPAP) GERD, pseudogout, migraines, renal stones, depression, asthma; PSH bladder repair, cholecystectomy. On 4/29/24, patient underwent an elective robotic-assisted hiatal herniorraphy w/ gastric bypass. On 4/30, patient was having abdominal pain and vomiting with a concern for possible aspiration. Patient underwent CT abdomen and pelvis showed a narrowed JJ anastomosis. On 4/31, patient was taken back to the OR for a diagnostic laparoscopy and found a narrowed jejunojejunal anastomosis, released and revised w/ negative leak test.    This morning, patient was found to by hypotensive and tachycardic on 4C by blue team. SICU consulted for resuscitation and hemodynamic instabililty. Patient was seen bedside on 4C. Patient is a/o x3, systolic BP in the 80s (improving to the 90s with fluid boluses), -110 sinus rhythm, SpO2 99% on NRFM at 15 LPM. Patient reports lower back pain and lower abdominal pain. Patient denies chest pain, dyspnea (although full inspiratory effort is limited by abdominal pain), nausea, vomiting, lightheadedness, dizziness.    Blue team bedside and says that current abdominal exam is her baseline and has not changed since post-op.       24 Hour Events    5/3  NIGHT  -A&O x3, on bipap, abdomen soft, nontender, drain serosang  -consider redosing lasix 20 if decreased UOP  -15 kphos, 1g Mag    DAY   - Lasix 20 mg @8am, 2.25 liters UO, Lasix 20 mg at 5:30 pm   - Atarax at bedtime   - IVL   - switch from HSQ to Lovenox    - okay to give Toradol -appproved by Dr. Montoya (ordered 3 doses prn)   - added dilaudid 0.2 q4 PRN   - trial BiPAP 4 hrs in am, backon 545pm-  -4pm BMP  -Atarax 25 mg IM x 1 stat at 5pm  -Attempted to decrease inspiratory pressure to 12, didn't tolerate increase to bipap setting -14/8, rate 14 and 55%  -4pm potassium 4.1, Creat 0.6  at 6pm neg 2165 ml      [X] A ten-point review of systems was otherwise negative except as noted above.  [  ] Due to altered mental status/intubation, subjective information was not attained from the patient. History was obtained, to the extent possible, from review of the chart and collateral sources of information.  ================================================================== SARAH BERGMAN   [id number]   [birth year]  58yF    ============================   SICU Consult for hemodynamic instability requiring vasopressor support    HPI   59 y/o female BMI 42.4 PMH HTN, RAFA (not on home CPAP/BiPAP) GERD, pseudogout, migraines, renal stones, depression, asthma; PSH bladder repair, cholecystectomy. On 4/29/24, patient underwent an elective robotic-assisted hiatal herniorraphy w/ gastric bypass. On 4/30, patient was having abdominal pain and vomiting with a concern for possible aspiration. Patient underwent CT abdomen and pelvis showed a narrowed JJ anastomosis. On 4/31, patient was taken back to the OR for a diagnostic laparoscopy and found a narrowed jejunojejunal anastomosis, released and revised w/ negative leak test.    This morning, patient was found to by hypotensive and tachycardic on 4C by blue team. SICU consulted for resuscitation and hemodynamic instabililty. Patient was seen bedside on 4C. Patient is a/o x3, systolic BP in the 80s (improving to the 90s with fluid boluses), -110 sinus rhythm, SpO2 99% on NRFM at 15 LPM. Patient reports lower back pain and lower abdominal pain. Patient denies chest pain, dyspnea (although full inspiratory effort is limited by abdominal pain), nausea, vomiting, lightheadedness, dizziness.    Blue team bedside and says that current abdominal exam is her baseline and has not changed since post-op.       24 Hour Events    5/3  NIGHT  -A&O x3, on bipap, abdomen soft, nontender, drain serosang  -consider redosing lasix 20 if decreased UOP  -15 kphos, 1g Mag  - anxious, tachypneic to 50s --> atarax IM 25mg stat x 1  - Worsening opacification on CXR, continued tachypnea and low saturation --> 20mg lasix    DAY   - Lasix 20 mg @8am, 2.25 liters UO, Lasix 20 mg at 5:30 pm   - Atarax at bedtime   - IVL   - switch from HSQ to Lovenox    - okay to give Toradol -appproved by Dr. Montoya (ordered 3 doses prn)   - added dilaudid 0.2 q4 PRN   - trial BiPAP 4 hrs in am, backon 545pm-  -4pm BMP  -Atarax 25 mg IM x 1 stat at 5pm  -Attempted to decrease inspiratory pressure to 12, didn't tolerate increase to bipap setting -14/8, rate 14 and 55%  -4pm potassium 4.1, Creat 0.6  at 6pm neg 2165 ml      [X] A ten-point review of systems was otherwise negative except as noted above.  [  ] Due to altered mental status/intubation, subjective information was not attained from the patient. History was obtained, to the extent possible, from review of the chart and collateral sources of information.  ================================================================== SARAH BERGMAN   [id number]   [birth year]  58yF    ============================   SICU Consult for hemodynamic instability requiring vasopressor support    HPI   57 y/o female BMI 42.4 PMH HTN, RAFA (not on home CPAP/BiPAP) GERD, pseudogout, migraines, renal stones, depression, asthma; PSH bladder repair, cholecystectomy. On 4/29/24, patient underwent an elective robotic-assisted hiatal herniorraphy w/ gastric bypass. On 4/30, patient was having abdominal pain and vomiting with a concern for possible aspiration. Patient underwent CT abdomen and pelvis showed a narrowed JJ anastomosis. On 4/31, patient was taken back to the OR for a diagnostic laparoscopy and found a narrowed jejunojejunal anastomosis, released and revised w/ negative leak test.    This morning, patient was found to by hypotensive and tachycardic on 4C by blue team. SICU consulted for resuscitation and hemodynamic instabililty. Patient was seen bedside on 4C. Patient is a/o x3, systolic BP in the 80s (improving to the 90s with fluid boluses), -110 sinus rhythm, SpO2 99% on NRFM at 15 LPM. Patient reports lower back pain and lower abdominal pain. Patient denies chest pain, dyspnea (although full inspiratory effort is limited by abdominal pain), nausea, vomiting, lightheadedness, dizziness.    Blue team bedside and says that current abdominal exam is her baseline and has not changed since post-op.       24 Hour Events    5/3  NIGHT  -A&O x3, on bipap, abdomen soft, nontender, drain serosang  -consider redosing lasix 20 if decreased UOP  -15 kphos, 1g Mag  - anxious, tachypneic to 50s --> atarax IM 25mg stat x 1  - Worsening opacification on CXR, continued tachypnea and low saturation --> 20mg lasix    DAY   - Lasix 20 mg @8am, 2.25 liters UO, Lasix 20 mg at 5:30 pm   - Atarax at bedtime   - IVL   - switch from HSQ to Lovenox    - okay to give Toradol -appproved by Dr. Montoya (ordered 3 doses prn)   - added dilaudid 0.2 q4 PRN   - trial BiPAP 4 hrs in am, backon 545pm-  -4pm BMP  -Atarax 25 mg IM x 1 stat at 5pm  -Attempted to decrease inspiratory pressure to 12, didn't tolerate increase to bipap setting -14/8, rate 14 and 55%  -4pm potassium 4.1, Creat 0.6  at 6pm neg 2165 ml      [X] A ten-point review of systems was otherwise negative except as noted above.  [  ] Due to altered mental status/intubation, subjective information was not attained from the patient. History was obtained, to the extent possible, from review of the chart and collateral sources of information.      Daily     Daily     Diet, NPO:   Except Medications (05-01-24 @ 11:40)      CURRENT MEDS:  Neurologic Medications  ALPRAZolam 0.25 milliGRAM(s) Oral every 8 hours  amitriptyline 10 milliGRAM(s) Oral at bedtime  buPROPion XL (24-Hour) . 150 milliGRAM(s) Oral daily  dexMEDEtomidine Infusion 0.02 MICROgram(s)/kG/Hr IV Continuous <Continuous>  DULoxetine 60 milliGRAM(s) Oral daily  HYDROmorphone  Injectable 0.2 milliGRAM(s) IV Push every 4 hours PRN Severe Pain (7 - 10)  hydrOXYzine hydrochloride Injectable 25 milliGRAM(s) IntraMuscular at bedtime PRN Anxiety  ketorolac   Injectable 15 milliGRAM(s) IV Push every 6 hours PRN Moderate Pain (4 - 6)  melatonin 5 milliGRAM(s) Oral at bedtime    Respiratory Medications  albuterol    90 MICROgram(s) HFA Inhaler 2 Puff(s) Inhalation every 6 hours PRN Shortness of Breath and/or Wheezing  albuterol/ipratropium for Nebulization 3 milliLiter(s) Nebulizer every 6 hours  sodium chloride 3%  Inhalation 4 milliLiter(s) Inhalation every 12 hours    Cardiovascular Medications    Gastrointestinal Medications  hyoscyamine SL 0.125 milliGRAM(s) SubLingual every 4 hours PRN Gastric Spasms  pantoprazole  Injectable 40 milliGRAM(s) IV Push every 24 hours  simethicone 80 milliGRAM(s) Chew four times a day    Genitourinary Medications    Hematologic/Oncologic Medications  enoxaparin Injectable 40 milliGRAM(s) SubCutaneous every 24 hours    Antimicrobial/Immunologic Medications  piperacillin/tazobactam IVPB.. 4.5 Gram(s) IV Intermittent every 8 hours    Endocrine/Metabolic Medications    Topical/Other Medications  chlorhexidine 2% Cloths 1 Application(s) Topical <User Schedule>      ICU Vital Signs Last 24 Hrs  T(C): 37.3 (04 May 2024 08:00), Max: 37.3 (04 May 2024 08:00)  T(F): 99.1 (04 May 2024 08:00), Max: 99.1 (04 May 2024 08:00)  HR: 105 (04 May 2024 07:00) (80 - 105)  BP: 129/78 (04 May 2024 07:00) (111/60 - 147/69)  BP(mean): 97 (04 May 2024 07:00) (80 - 107)  ABP: --  ABP(mean): --  RR: 57 (04 May 2024 07:00) (42 - 57)  SpO2: 91% (04 May 2024 07:00) (82% - 99%)    O2 Parameters below as of 04 May 2024 07:00  Patient On (Oxygen Delivery Method): BiPAP/CPAP            Adult Advanced Hemodynamics Last 24 Hrs  CVP(mm Hg): --  CVP(cm H2O): --  CO: --  CI: --  PA: --  PA(mean): --  PCWP: --  SVR: --  SVRI: --  PVR: --  PVRI: --          I&O's Summary    03 May 2024 07:01  -  04 May 2024 07:00  --------------------------------------------------------  IN: 970 mL / OUT: 4610 mL / NET: -3640 mL    04 May 2024 07:01  -  04 May 2024 08:09  --------------------------------------------------------  IN: 0 mL / OUT: 20 mL / NET: -20 mL      I&O's Detail    03 May 2024 07:01  -  04 May 2024 07:00  --------------------------------------------------------  IN:    IV PiggyBack: 100 mL    IV PiggyBack: 250 mL    IV PiggyBack: 275 mL    Lactated Ringers: 75 mL    Oral Fluid: 270 mL  Total IN: 970 mL    OUT:    Bulb (mL): 110 mL    Voided (mL): 4500 mL  Total OUT: 4610 mL    Total NET: -3640 mL      04 May 2024 07:01  -  04 May 2024 08:09  --------------------------------------------------------  IN:  Total IN: 0 mL    OUT:    Bulb (mL): 20 mL  Total OUT: 20 mL    Total NET: -20 mL          PHYSICAL EXAM:   a&ox3  follows commands, CINTRON  no acute distress  equal chest rise b/l  abdomen soft  extremities soft  urinary cath in place      ==================================================================

## 2024-05-04 NOTE — DIETITIAN INITIAL EVALUATION ADULT - NAME AND PHONE
Intervention: 1.Meals and Snacks   Monitor/Evaluate: Diet order, energy intake, nutrition focused physical findings, anemia profile

## 2024-05-04 NOTE — DIETITIAN INITIAL EVALUATION ADULT - NSFNSGIIOFT_GEN_A_CORE
Dx: 57y/o female with h/o HTN, RAFA (not on home CPAP/BIPAP) GERD, pseudogout, migraines, renal stones, anxiety, depression, insomnia, asthma, bladder repair, cholecystectomy and obesity, admitted for elective gastric bypass and hiatal hernia repair. S/p elective robotic-assisted hiatal herniorraphy with gastric bypass (4/29). On (4/30), the patient was having abdominal pain and vomiting with a concern for possible aspiration. The patient underwent a CT scan of the abdomen and pelvis, which showed a narrowed JJ anastomosis. On (4/31), the patient was taken back to the OR for a diagnostic laparoscopy and found a narrowed jejunojejunal anastomosis, released and revised with negative leak test, JASON, and upper endoscopic release of SBO. Hospital course is complicated by possible aspiration pneumonia, hypoxic respiratory failure, hypotension, elevated troponins and KIKA (resolved). MAP-94.

## 2024-05-04 NOTE — DIETITIAN INITIAL EVALUATION ADULT - PERTINENT MEDS FT
MEDICATIONS  (STANDING):  albuterol/ipratropium for Nebulization 3 milliLiter(s) Nebulizer every 6 hours  ALPRAZolam 0.25 milliGRAM(s) Oral every 8 hours  amitriptyline 10 milliGRAM(s) Oral at bedtime  buPROPion XL (24-Hour) . 150 milliGRAM(s) Oral daily  chlorhexidine 2% Cloths 1 Application(s) Topical <User Schedule>  dexMEDEtomidine Infusion 0.75 MICROgram(s)/kG/Hr (21 mL/Hr) IV Continuous <Continuous>  DULoxetine 60 milliGRAM(s) Oral daily  enoxaparin Injectable 40 milliGRAM(s) SubCutaneous every 24 hours  guaiFENesin Oral Liquid (Sugar-Free) 200 milliGRAM(s) Oral every 12 hours  melatonin 5 milliGRAM(s) Oral at bedtime  pantoprazole  Injectable 40 milliGRAM(s) IV Push every 24 hours  piperacillin/tazobactam IVPB.. 4.5 Gram(s) IV Intermittent every 8 hours  simethicone 80 milliGRAM(s) Chew four times a day  sodium chloride 3%  Inhalation 4 milliLiter(s) Inhalation every 12 hours    MEDICATIONS  (PRN):  albuterol    90 MICROgram(s) HFA Inhaler 2 Puff(s) Inhalation every 6 hours PRN Shortness of Breath and/or Wheezing  HYDROmorphone  Injectable 0.2 milliGRAM(s) IV Push every 4 hours PRN Severe Pain (7 - 10)  hydrOXYzine hydrochloride Injectable 25 milliGRAM(s) IntraMuscular once PRN Anxiety  hydrOXYzine hydrochloride Injectable 25 milliGRAM(s) IntraMuscular at bedtime PRN Anxiety  hyoscyamine SL 0.125 milliGRAM(s) SubLingual every 4 hours PRN Gastric Spasms

## 2024-05-04 NOTE — PROGRESS NOTE ADULT - ASSESSMENT
A/P:  58y F w/ PMHx of  obesity (BMI 43), GERD, RAFA (CPAP), HTN, ASTHMA admitted for elective gastric bypass and hiatal hernia repair, POD 2, Hosp day 3. Now is POD 0 for RTOR for diagnostic lap with JJ anastomosis revision, JASON, and upper endoscopic release of SBO.    PLAN:   zosyn  pain control  I/O  HD Monitoring  care per sicu       [id number]

## 2024-05-04 NOTE — DIETITIAN INITIAL EVALUATION ADULT - OTHER CALCULATIONS
Estimated Calorie Needs: 1540-1760kcal/day (28-32kcal/kg of IBW-55kg) -Due to obesity  Estimated Protein Needs: 72-88grams/day (1.3-1.6grams/kg of IBW-55kg) -Due to obesity and post op status  Estimated Fluid Needs: 1540-1760mL/day (1mL/kcal) vs Fluids per SICU Team

## 2024-05-04 NOTE — DIETITIAN INITIAL EVALUATION ADULT - ORAL INTAKE PTA/DIET HISTORY
An interview with the patient could not be completed at this time since they are asleep. Once medically feasible, an interview with the patient will be completed.

## 2024-05-04 NOTE — PROGRESS NOTE ADULT - ASSESSMENT
58y Female s/p robotic-assisted hiatal herniorraphy w/ gastric bypass (4/29), diagnostic laparoscopy with released and revised JJ anastomosis narrowing w/ negative leak test (4/30).    Anxiety/Depression/Insomnia   Possible Aspiration pneumonia/Hpoxic respiratory failure    Hypotension   S/P robotic-assisted hiatal herniorraphy w/ gastric bypass (4/29), diagnostic laparoscopy with released and revised JJ anastomosis narrowing w/ negative leak test (4/30)  KIKA (resolved)   Hypomagnesemia/Hypophosphatemia/hypokalemia   Elevated troponins       Continue current care as per surgical team/SICU   DVT/GI prophylaxis   Keep negative balance/Diuretics/Daily weight and monitor electrolytes and creatinine   Pulmonary evaluation   ID evaluation and follow up  2Decho  Will F/U

## 2024-05-04 NOTE — DIETITIAN INITIAL EVALUATION ADULT - PERTINENT LABORATORY DATA
05-04    137  |  94<L>  |  12  ----------------------------<  118<H>  3.8   |  31  |  0.6<L>    Ca    8.5      04 May 2024 11:03  Phos  2.5     05-04  Mg     1.9     05-04    POCT Blood Glucose.: 109 mg/dL (05-04-24 @ 17:51)  A1C with Estimated Average Glucose Result: 5.9 % (04-15-24 @ 11:13)

## 2024-05-04 NOTE — DIETITIAN INITIAL EVALUATION ADULT - COLLABORATION WITH OTHER PROVIDERS
I spoke to the patient's physician assistant name Lanette Heaton ([facility] #[id number]) regarding my nutritional recommendations.

## 2024-05-04 NOTE — DIETITIAN INITIAL EVALUATION ADULT - LITERATURE/VIDEOS GIVEN
Nutrition education is deferred secondary to current medical state. Once medically feasible, nutrition education will be completed.

## 2024-05-04 NOTE — PROGRESS NOTE ADULT - SUBJECTIVE AND OBJECTIVE BOX
GENERAL SURGERY PROGRESS NOTE     SARAH BERGMAN  [address] [place] [address] day :5d      24 hour events:  NIGHT  -A&O x3, on bipap, abdomen soft, nontender, drain serosang  -consider redosing lasix 20 if decreased UOP  -15 kphos, 1g Mag  - anxious, tachypneic to 50s --> atarax IM 25mg stat x 1  - Worsening opacification on CXR, continued tachypnea and low saturation --> 20mg lasix    DAY   - Lasix 20 mg @8am, 2.25 liters UO, Lasix 20 mg at 5:30 pm   - Atarax at bedtime   - IVL   - switch from HSQ to Lovenox    - okay to give Toradol -appproved by Dr. Montoya (ordered 3 doses prn)   - added dilaudid 0.2 q4 PRN   - trial BiPAP 4 hrs in am, backon 545pm-  -4pm BMP  -Atarax 25 mg IM x 1 stat at 5pm  -Attempted to decrease inspiratory pressure to 12, didn't tolerate increase to bipap setting -14/8, rate 14 and 55%  -4pm potassium 4.1, Creat 0.6  at 6pm neg 2165 ml      PHYSICAL EXAM:  GENERAL: NAD, well-appearing  CHEST/LUNG: Clear to auscultation bilaterally  HEART: Regular rate and rhythm  ABDOMEN: Soft, Nontender, Nondistended;   EXTREMITIES:  No clubbing, cyanosis, or edema        T(F): 99.1 (05-04-24 @ 08:00), Max: 99.1 (05-04-24 @ 08:00)  HR: 104 (05-04-24 @ 08:36) (80 - 105)  BP: 135/87 (05-04-24 @ 08:00) (111/60 - 147/69)  ABP: --  ABP(mean): --  RR: 50 (05-04-24 @ 08:00) (42 - 57)  SpO2: 96% (05-04-24 @ 08:36) (82% - 99%)    IN'S / OUT's:    05-03-24 @ 07:01  -  05-04-24 @ 07:00  --------------------------------------------------------  IN:    IV PiggyBack: 100 mL    IV PiggyBack: 250 mL    IV PiggyBack: 275 mL    Lactated Ringers: 75 mL    Oral Fluid: 270 mL  Total IN: 970 mL    OUT:    Bulb (mL): 110 mL    Voided (mL): 4500 mL  Total OUT: 4610 mL    Total NET: -3640 mL      05-04-24 @ 07:01  -  05-04-24 @ 08:38  --------------------------------------------------------  IN:  Total IN: 0 mL    OUT:    Bulb (mL): 20 mL    Voided (mL): 50 mL  Total OUT: 70 mL    Total NET: -70 mL          LABS  Labs:  CAPILLARY BLOOD GLUCOSE      POCT Blood Glucose.: 93 mg/dL (04 May 2024 05:26)  POCT Blood Glucose.: 122 mg/dL (03 May 2024 23:39)  POCT Blood Glucose.: 102 mg/dL (03 May 2024 19:27)  POCT Blood Glucose.: 112 mg/dL (03 May 2024 13:12)                          9.7    13.56 )-----------( 204      ( 04 May 2024 00:58 )             32.5         05-04    136  |  98  |  13  ----------------------------<  91  3.9   |  27  |  0.6<L>      Calcium: 8.0 mg/dL (05-04-24 @ 00:58)      LFTs:     Blood Gas Arterial, Lactate: 1.5 mmol/L (05-02-24 @ 06:13)  Blood Gas Arterial, Lactate: 1.7 mmol/L (05-02-24 @ 00:18)  Blood Gas Arterial, Lactate: 2.2 mmol/L (05-01-24 @ 18:01)  Lactate, Blood: 3.3 mmol/L (05-01-24 @ 09:01)  Blood Gas Arterial, Lactate: 3.0 mmol/L (05-01-24 @ 08:58)    ABG - ( 02 May 2024 06:13 )  pH: 7.43  /  pCO2: 44    /  pO2: 80    / HCO3: 29    / Base Excess: 4.2   /  SaO2: 98.6            ABG - ( 02 May 2024 00:18 )  pH: 7.38  /  pCO2: 47    /  pO2: 60    / HCO3: 28    / Base Excess: 2.0   /  SaO2: 91.9            ABG - ( 01 May 2024 18:01 )  pH: 7.39  /  pCO2: 45    /  pO2: 64    / HCO3: 27    / Base Excess: 1.7   /  SaO2: 94.7              Coags:            Urinalysis Basic - ( 04 May 2024 00:58 )    Color: x / Appearance: x / SG: x / pH: x  Gluc: 91 mg/dL / Ketone: x  / Bili: x / Urobili: x   Blood: x / Protein: x / Nitrite: x   Leuk Esterase: x / RBC: x / WBC x   Sq Epi: x / Non Sq Epi: x / Bacteria: x            RADIOLOGY & ADDITIONAL TESTS:

## 2024-05-04 NOTE — PROGRESS NOTE ADULT - SUBJECTIVE AND OBJECTIVE BOX
57 y/o female BMI 42.4 PMH HTN, RAFA (not on home CPAP/BiPAP) GERD, pseudogout, migraines, renal stones, depression, asthma; PSH bladder repair, cholecystectomy. On 4/29/24, patient underwent an elective robotic-assisted hiatal herniorraphy w/ gastric bypass. On 4/30, patient was having abdominal pain and vomiting with a concern for possible aspiration. Patient underwent CT abdomen and pelvis showed a narrowed JJ anastomosis. On 4/31, patient was taken back to the OR for a diagnostic laparoscopy and found a narrowed jejunojejunal anastomosis, released and revised w/ negative leak test.    This morning, patient was found to by hypotensive and tachycardic on 4C by blue team. SICU consulted for resuscitation and hemodynamic instabililty. Patient was seen bedside on 4C. Patient is a/o x3, systolic BP in the 80s (improving to the 90s with fluid boluses), -110 sinus rhythm, SpO2 99% on NRFM at 15 LPM. Patient reports lower back pain and lower abdominal pain. Patient denies chest pain, dyspnea (although full inspiratory effort is limited by abdominal pain), nausea, vomiting, lightheadedness, dizziness.    Blue team bedside and says that current abdominal exam is her baseline and has not changed since post-op.      The patient denies any chest pain or palpitations feels mildly SOB on Bipap       MEDICATIONS  (STANDING):  albuterol/ipratropium for Nebulization 3 milliLiter(s) Nebulizer every 6 hours  ALPRAZolam 0.25 milliGRAM(s) Oral every 8 hours  amitriptyline 10 milliGRAM(s) Oral at bedtime  buPROPion XL (24-Hour) . 150 milliGRAM(s) Oral daily  chlorhexidine 2% Cloths 1 Application(s) Topical <User Schedule>  dexMEDEtomidine Infusion 0.75 MICROgram(s)/kG/Hr (21 mL/Hr) IV Continuous <Continuous>  DULoxetine 60 milliGRAM(s) Oral daily  enoxaparin Injectable 40 milliGRAM(s) SubCutaneous every 24 hours  guaiFENesin Oral Liquid (Sugar-Free) 200 milliGRAM(s) Oral every 12 hours  melatonin 5 milliGRAM(s) Oral at bedtime  pantoprazole  Injectable 40 milliGRAM(s) IV Push every 24 hours  piperacillin/tazobactam IVPB.. 4.5 Gram(s) IV Intermittent every 8 hours  simethicone 80 milliGRAM(s) Chew four times a day  sodium chloride 3%  Inhalation 4 milliLiter(s) Inhalation every 12 hours    MEDICATIONS  (PRN):  albuterol    90 MICROgram(s) HFA Inhaler 2 Puff(s) Inhalation every 6 hours PRN Shortness of Breath and/or Wheezing  HYDROmorphone  Injectable 0.2 milliGRAM(s) IV Push every 4 hours PRN Severe Pain (7 - 10)  hydrOXYzine hydrochloride Injectable 25 milliGRAM(s) IntraMuscular once PRN Anxiety  hydrOXYzine hydrochloride Injectable 25 milliGRAM(s) IntraMuscular at bedtime PRN Anxiety  hyoscyamine SL 0.125 milliGRAM(s) SubLingual every 4 hours PRN Gastric Spasms            Vital Signs Last 24 Hrs  T(C): 37.3 (04 May 2024 16:00), Max: 37.4 (04 May 2024 12:00)  T(F): 99.2 (04 May 2024 16:00), Max: 99.3 (04 May 2024 12:00)  HR: 91 (04 May 2024 18:00) (80 - 107)  BP: 93/54 (04 May 2024 18:00) (93/54 - 145/78)  BP(mean): 68 (04 May 2024 18:00) (68 - 106)  RR: 58 (04 May 2024 18:00) (41 - 58)  SpO2: 87% (04 May 2024 18:00) (82% - 99%)    Parameters below as of 04 May 2024 18:00  Patient On (Oxygen Delivery Method): BiPAP/CPAP    O2 Concentration (%): 70     REVIEW OF SYSTEMS:  CONSTITUTIONAL: No fever, weight loss, or fatigue  CARDIOLOGY: PAtient denies chest pain or palpitations. Mildly SOB    RESPIRATORY: denies shortness of breath, wheezeing   NEUROLOGICAL: NO weakness, no focal deficits to report   GI: no BRBPR, no N,V,diarrhea.    PSYCHIATRY: normal mood and affect  HEENT: no nasal discharge, no ecchymosis  SKIN: no ecchymosis, no breakdown  MUSCULOSKELETAL: Full range of motion x4.        PHYSICAL EXAM:  · CONSTITUTIONAL:	Well-developed, well nourished     ·RESPIRATORY:   airway patent; breath sounds equal; good air movement; respirations non-labored; + scattered wheeze bilaterally   · CARDIOVASCULAR	regular rate and rhythm  + EDD   · EXTREMITIES: No cyanosis, clubbing or edema  · VASCULAR:  No JVD   	  TELEMETRY: Sinus/Occasional PACs/PVCs     ECG: Sinus     LABS:                        9.7    13.56 )-----------( 204      ( 04 May 2024 00:58 )             32.5     05-04    137  |  94<L>  |  12  ----------------------------<  118<H>  3.8   |  31  |  0.6<L>    Ca    8.5      04 May 2024 11:03  Phos  2.5     05-04  Mg     1.9     05-04              I&O's Summary    03 May 2024 07:01  -  04 May 2024 07:00  --------------------------------------------------------  IN: 970 mL / OUT: 4610 mL / NET: -3640 mL    04 May 2024 07:01  -  04 May 2024 19:02  --------------------------------------------------------  IN: 334.4 mL / OUT: 1525 mL / NET: -1190.6 mL      BNP  RADIOLOGY & ADDITIONAL STUDIES:    IMPRESSION AND PLAN:

## 2024-05-05 DIAGNOSIS — J96.01 ACUTE RESPIRATORY FAILURE WITH HYPOXIA: ICD-10-CM

## 2024-05-05 DIAGNOSIS — J69.0 PNEUMONITIS DUE TO INHALATION OF FOOD AND VOMIT: ICD-10-CM

## 2024-05-05 LAB
ANION GAP SERPL CALC-SCNC: 10 MMOL/L — SIGNIFICANT CHANGE UP (ref 7–14)
ANION GAP SERPL CALC-SCNC: 13 MMOL/L — SIGNIFICANT CHANGE UP (ref 7–14)
BUN SERPL-MCNC: 15 MG/DL — SIGNIFICANT CHANGE UP (ref 10–20)
BUN SERPL-MCNC: 17 MG/DL — SIGNIFICANT CHANGE UP (ref 10–20)
CALCIUM SERPL-MCNC: 8.3 MG/DL — LOW (ref 8.4–10.5)
CALCIUM SERPL-MCNC: 8.9 MG/DL — SIGNIFICANT CHANGE UP (ref 8.4–10.4)
CHLORIDE SERPL-SCNC: 100 MMOL/L — SIGNIFICANT CHANGE UP (ref 98–110)
CHLORIDE SERPL-SCNC: 98 MMOL/L — SIGNIFICANT CHANGE UP (ref 98–110)
CO2 SERPL-SCNC: 30 MMOL/L — SIGNIFICANT CHANGE UP (ref 17–32)
CO2 SERPL-SCNC: 30 MMOL/L — SIGNIFICANT CHANGE UP (ref 17–32)
CREAT SERPL-MCNC: 0.6 MG/DL — LOW (ref 0.7–1.5)
CREAT SERPL-MCNC: 0.6 MG/DL — LOW (ref 0.7–1.5)
EGFR: 104 ML/MIN/1.73M2 — SIGNIFICANT CHANGE UP
EGFR: 104 ML/MIN/1.73M2 — SIGNIFICANT CHANGE UP
GAS PNL BLDA: SIGNIFICANT CHANGE UP
GLUCOSE BLDC GLUCOMTR-MCNC: 122 MG/DL — HIGH (ref 70–99)
GLUCOSE BLDC GLUCOMTR-MCNC: 126 MG/DL — HIGH (ref 70–99)
GLUCOSE BLDC GLUCOMTR-MCNC: 129 MG/DL — HIGH (ref 70–99)
GLUCOSE BLDC GLUCOMTR-MCNC: 148 MG/DL — HIGH (ref 70–99)
GLUCOSE SERPL-MCNC: 123 MG/DL — HIGH (ref 70–99)
GLUCOSE SERPL-MCNC: 127 MG/DL — HIGH (ref 70–99)
HCT VFR BLD CALC: 33 % — LOW (ref 37–47)
HCT VFR BLD CALC: 34.2 % — LOW (ref 37–47)
HGB BLD-MCNC: 10.1 G/DL — LOW (ref 12–16)
HGB BLD-MCNC: 10.3 G/DL — LOW (ref 12–16)
MAGNESIUM SERPL-MCNC: 1.9 MG/DL — SIGNIFICANT CHANGE UP (ref 1.8–2.4)
MAGNESIUM SERPL-MCNC: 2 MG/DL — SIGNIFICANT CHANGE UP (ref 1.8–2.4)
MCHC RBC-ENTMCNC: 26.3 PG — LOW (ref 27–31)
MCHC RBC-ENTMCNC: 27.1 PG — SIGNIFICANT CHANGE UP (ref 27–31)
MCHC RBC-ENTMCNC: 30.1 G/DL — LOW (ref 32–37)
MCHC RBC-ENTMCNC: 30.6 G/DL — LOW (ref 32–37)
MCV RBC AUTO: 87.2 FL — SIGNIFICANT CHANGE UP (ref 81–99)
MCV RBC AUTO: 88.5 FL — SIGNIFICANT CHANGE UP (ref 81–99)
NRBC # BLD: 0 /100 WBCS — SIGNIFICANT CHANGE UP (ref 0–0)
NRBC # BLD: 0 /100 WBCS — SIGNIFICANT CHANGE UP (ref 0–0)
PHOSPHATE SERPL-MCNC: 2.3 MG/DL — SIGNIFICANT CHANGE UP (ref 2.1–4.9)
PHOSPHATE SERPL-MCNC: 2.8 MG/DL — SIGNIFICANT CHANGE UP (ref 2.1–4.9)
PLATELET # BLD AUTO: 257 K/UL — SIGNIFICANT CHANGE UP (ref 130–400)
PLATELET # BLD AUTO: 284 K/UL — SIGNIFICANT CHANGE UP (ref 130–400)
PMV BLD: 10.1 FL — SIGNIFICANT CHANGE UP (ref 7.4–10.4)
PMV BLD: 10.4 FL — SIGNIFICANT CHANGE UP (ref 7.4–10.4)
POTASSIUM SERPL-MCNC: 3.9 MMOL/L — SIGNIFICANT CHANGE UP (ref 3.5–5)
POTASSIUM SERPL-MCNC: 3.9 MMOL/L — SIGNIFICANT CHANGE UP (ref 3.5–5)
POTASSIUM SERPL-SCNC: 3.9 MMOL/L — SIGNIFICANT CHANGE UP (ref 3.5–5)
POTASSIUM SERPL-SCNC: 3.9 MMOL/L — SIGNIFICANT CHANGE UP (ref 3.5–5)
RBC # BLD: 3.73 M/UL — LOW (ref 4.2–5.4)
RBC # BLD: 3.92 M/UL — LOW (ref 4.2–5.4)
RBC # FLD: 15.4 % — HIGH (ref 11.5–14.5)
RBC # FLD: 15.4 % — HIGH (ref 11.5–14.5)
SODIUM SERPL-SCNC: 140 MMOL/L — SIGNIFICANT CHANGE UP (ref 135–146)
SODIUM SERPL-SCNC: 141 MMOL/L — SIGNIFICANT CHANGE UP (ref 135–146)
WBC # BLD: 7.56 K/UL — SIGNIFICANT CHANGE UP (ref 4.8–10.8)
WBC # BLD: 7.9 K/UL — SIGNIFICANT CHANGE UP (ref 4.8–10.8)
WBC # FLD AUTO: 7.56 K/UL — SIGNIFICANT CHANGE UP (ref 4.8–10.8)
WBC # FLD AUTO: 7.9 K/UL — SIGNIFICANT CHANGE UP (ref 4.8–10.8)

## 2024-05-05 PROCEDURE — 76937 US GUIDE VASCULAR ACCESS: CPT | Mod: 26

## 2024-05-05 PROCEDURE — 36620 INSERTION CATHETER ARTERY: CPT

## 2024-05-05 PROCEDURE — 99291 CRITICAL CARE FIRST HOUR: CPT | Mod: 25

## 2024-05-05 PROCEDURE — 71045 X-RAY EXAM CHEST 1 VIEW: CPT | Mod: 26

## 2024-05-05 RX ORDER — FUROSEMIDE 40 MG
20 TABLET ORAL ONCE
Refills: 0 | Status: COMPLETED | OUTPATIENT
Start: 2024-05-05 | End: 2024-05-05

## 2024-05-05 RX ORDER — ACETAMINOPHEN 500 MG
1000 TABLET ORAL ONCE
Refills: 0 | Status: COMPLETED | OUTPATIENT
Start: 2024-05-05 | End: 2024-05-05

## 2024-05-05 RX ORDER — POTASSIUM PHOSPHATE, MONOBASIC POTASSIUM PHOSPHATE, DIBASIC 236; 224 MG/ML; MG/ML
15 INJECTION, SOLUTION INTRAVENOUS ONCE
Refills: 0 | Status: COMPLETED | OUTPATIENT
Start: 2024-05-05 | End: 2024-05-05

## 2024-05-05 RX ORDER — DEXMEDETOMIDINE HYDROCHLORIDE IN 0.9% SODIUM CHLORIDE 4 UG/ML
0.75 INJECTION INTRAVENOUS
Qty: 400 | Refills: 0 | Status: DISCONTINUED | OUTPATIENT
Start: 2024-05-05 | End: 2024-05-06

## 2024-05-05 RX ORDER — ETOMIDATE 2 MG/ML
20 INJECTION INTRAVENOUS ONCE
Refills: 0 | Status: DISCONTINUED | OUTPATIENT
Start: 2024-05-05 | End: 2024-05-05

## 2024-05-05 RX ORDER — CHLORHEXIDINE GLUCONATE 213 G/1000ML
15 SOLUTION TOPICAL EVERY 12 HOURS
Refills: 0 | Status: DISCONTINUED | OUTPATIENT
Start: 2024-05-05 | End: 2024-05-06

## 2024-05-05 RX ORDER — PROPOFOL 10 MG/ML
1.99 INJECTION, EMULSION INTRAVENOUS
Qty: 500 | Refills: 0 | Status: DISCONTINUED | OUTPATIENT
Start: 2024-05-05 | End: 2024-05-05

## 2024-05-05 RX ORDER — PHENYLEPHRINE HYDROCHLORIDE 10 MG/ML
0.1 INJECTION INTRAVENOUS
Qty: 40 | Refills: 0 | Status: DISCONTINUED | OUTPATIENT
Start: 2024-05-05 | End: 2024-05-08

## 2024-05-05 RX ORDER — MAGNESIUM SULFATE 500 MG/ML
1 VIAL (ML) INJECTION ONCE
Refills: 0 | Status: COMPLETED | OUTPATIENT
Start: 2024-05-05 | End: 2024-05-05

## 2024-05-05 RX ORDER — PROPOFOL 10 MG/ML
2 INJECTION, EMULSION INTRAVENOUS
Qty: 500 | Refills: 0 | Status: DISCONTINUED | OUTPATIENT
Start: 2024-05-05 | End: 2024-05-05

## 2024-05-05 RX ORDER — FENTANYL CITRATE 50 UG/ML
0.5 INJECTION INTRAVENOUS
Qty: 2500 | Refills: 0 | Status: DISCONTINUED | OUTPATIENT
Start: 2024-05-05 | End: 2024-05-08

## 2024-05-05 RX ORDER — FENTANYL CITRATE 50 UG/ML
25 INJECTION INTRAVENOUS
Refills: 0 | Status: DISCONTINUED | OUTPATIENT
Start: 2024-05-05 | End: 2024-05-05

## 2024-05-05 RX ORDER — PROPOFOL 10 MG/ML
10 INJECTION, EMULSION INTRAVENOUS
Qty: 1000 | Refills: 0 | Status: DISCONTINUED | OUTPATIENT
Start: 2024-05-05 | End: 2024-05-07

## 2024-05-05 RX ORDER — PHENYLEPHRINE HYDROCHLORIDE 10 MG/ML
10 INJECTION INTRAVENOUS ONCE
Refills: 0 | Status: COMPLETED | OUTPATIENT
Start: 2024-05-05 | End: 2024-05-05

## 2024-05-05 RX ORDER — FENTANYL CITRATE 50 UG/ML
0.5 INJECTION INTRAVENOUS
Qty: 2500 | Refills: 0 | Status: DISCONTINUED | OUTPATIENT
Start: 2024-05-05 | End: 2024-05-05

## 2024-05-05 RX ORDER — CALCIUM GLUCONATE 100 MG/ML
2 VIAL (ML) INTRAVENOUS ONCE
Refills: 0 | Status: COMPLETED | OUTPATIENT
Start: 2024-05-05 | End: 2024-05-05

## 2024-05-05 RX ORDER — KETAMINE HYDROCHLORIDE 100 MG/ML
10 INJECTION INTRAMUSCULAR; INTRAVENOUS ONCE
Refills: 0 | Status: DISCONTINUED | OUTPATIENT
Start: 2024-05-05 | End: 2024-05-05

## 2024-05-05 RX ORDER — ROCURONIUM BROMIDE 10 MG/ML
100 VIAL (ML) INTRAVENOUS ONCE
Refills: 0 | Status: DISCONTINUED | OUTPATIENT
Start: 2024-05-05 | End: 2024-05-05

## 2024-05-05 RX ORDER — ALBUMIN HUMAN 25 %
50 VIAL (ML) INTRAVENOUS ONCE
Refills: 0 | Status: COMPLETED | OUTPATIENT
Start: 2024-05-05 | End: 2024-05-05

## 2024-05-05 RX ORDER — CISATRACURIUM BESYLATE 2 MG/ML
3 INJECTION INTRAVENOUS
Qty: 200 | Refills: 0 | Status: DISCONTINUED | OUTPATIENT
Start: 2024-05-05 | End: 2024-05-07

## 2024-05-05 RX ADMIN — CHLORHEXIDINE GLUCONATE 1 APPLICATION(S): 213 SOLUTION TOPICAL at 05:15

## 2024-05-05 RX ADMIN — PIPERACILLIN AND TAZOBACTAM 25 GRAM(S): 4; .5 INJECTION, POWDER, LYOPHILIZED, FOR SOLUTION INTRAVENOUS at 22:58

## 2024-05-05 RX ADMIN — PHENYLEPHRINE HYDROCHLORIDE 10 MILLIGRAM(S): 10 INJECTION INTRAVENOUS at 07:10

## 2024-05-05 RX ADMIN — ENOXAPARIN SODIUM 40 MILLIGRAM(S): 100 INJECTION SUBCUTANEOUS at 11:37

## 2024-05-05 RX ADMIN — Medication 20 MILLIGRAM(S): at 15:20

## 2024-05-05 RX ADMIN — CISATRACURIUM BESYLATE 20.2 MICROGRAM(S)/KG/MIN: 2 INJECTION INTRAVENOUS at 19:31

## 2024-05-05 RX ADMIN — Medication 1 DROP(S): at 18:15

## 2024-05-05 RX ADMIN — CHLORHEXIDINE GLUCONATE 15 MILLILITER(S): 213 SOLUTION TOPICAL at 18:11

## 2024-05-05 RX ADMIN — Medication 50 MILLILITER(S): at 23:13

## 2024-05-05 RX ADMIN — Medication 3 MILLILITER(S): at 02:22

## 2024-05-05 RX ADMIN — DEXMEDETOMIDINE HYDROCHLORIDE IN 0.9% SODIUM CHLORIDE 21 MICROGRAM(S)/KG/HR: 4 INJECTION INTRAVENOUS at 16:06

## 2024-05-05 RX ADMIN — Medication 100 GRAM(S): at 22:58

## 2024-05-05 RX ADMIN — PROPOFOL 6.72 MICROGRAM(S)/KG/MIN: 10 INJECTION, EMULSION INTRAVENOUS at 09:38

## 2024-05-05 RX ADMIN — Medication 400 MILLIGRAM(S): at 14:46

## 2024-05-05 RX ADMIN — PROPOFOL 6.72 MICROGRAM(S)/KG/MIN: 10 INJECTION, EMULSION INTRAVENOUS at 11:36

## 2024-05-05 RX ADMIN — PROPOFOL 1.34 MICROGRAM(S)/KG/MIN: 10 INJECTION, EMULSION INTRAVENOUS at 08:04

## 2024-05-05 RX ADMIN — Medication 25 MILLIGRAM(S): at 00:58

## 2024-05-05 RX ADMIN — PANTOPRAZOLE SODIUM 40 MILLIGRAM(S): 20 TABLET, DELAYED RELEASE ORAL at 18:12

## 2024-05-05 RX ADMIN — DEXMEDETOMIDINE HYDROCHLORIDE IN 0.9% SODIUM CHLORIDE 21 MICROGRAM(S)/KG/HR: 4 INJECTION INTRAVENOUS at 19:30

## 2024-05-05 RX ADMIN — PROPOFOL 6.72 MICROGRAM(S)/KG/MIN: 10 INJECTION, EMULSION INTRAVENOUS at 16:05

## 2024-05-05 RX ADMIN — DEXMEDETOMIDINE HYDROCHLORIDE IN 0.9% SODIUM CHLORIDE 21 MICROGRAM(S)/KG/HR: 4 INJECTION INTRAVENOUS at 08:04

## 2024-05-05 RX ADMIN — Medication 200 GRAM(S): at 18:26

## 2024-05-05 RX ADMIN — KETAMINE HYDROCHLORIDE 10 MILLIGRAM(S): 100 INJECTION INTRAMUSCULAR; INTRAVENOUS at 06:06

## 2024-05-05 RX ADMIN — PIPERACILLIN AND TAZOBACTAM 25 GRAM(S): 4; .5 INJECTION, POWDER, LYOPHILIZED, FOR SOLUTION INTRAVENOUS at 05:15

## 2024-05-05 RX ADMIN — FENTANYL CITRATE 5.6 MICROGRAM(S)/KG/HR: 50 INJECTION INTRAVENOUS at 08:11

## 2024-05-05 RX ADMIN — POTASSIUM PHOSPHATE, MONOBASIC POTASSIUM PHOSPHATE, DIBASIC 63.75 MILLIMOLE(S): 236; 224 INJECTION, SOLUTION INTRAVENOUS at 02:09

## 2024-05-05 RX ADMIN — PROPOFOL 6.72 MICROGRAM(S)/KG/MIN: 10 INJECTION, EMULSION INTRAVENOUS at 19:31

## 2024-05-05 RX ADMIN — Medication 1 MILLIGRAM(S): at 04:21

## 2024-05-05 RX ADMIN — POTASSIUM PHOSPHATE, MONOBASIC POTASSIUM PHOSPHATE, DIBASIC 63.75 MILLIMOLE(S): 236; 224 INJECTION, SOLUTION INTRAVENOUS at 22:58

## 2024-05-05 RX ADMIN — FENTANYL CITRATE 5.6 MICROGRAM(S)/KG/HR: 50 INJECTION INTRAVENOUS at 19:31

## 2024-05-05 RX ADMIN — PIPERACILLIN AND TAZOBACTAM 25 GRAM(S): 4; .5 INJECTION, POWDER, LYOPHILIZED, FOR SOLUTION INTRAVENOUS at 15:25

## 2024-05-05 RX ADMIN — PHENYLEPHRINE HYDROCHLORIDE 4.2 MICROGRAM(S)/KG/MIN: 10 INJECTION INTRAVENOUS at 19:31

## 2024-05-05 NOTE — PROGRESS NOTE ADULT - PROVIDER SPECIALTY LIST ADULT
[FreeTextEntry1] : Patient is a 73 year old male with a history of HTN, HLD, thyroid cancer s/p thyroidectomy w/ XRT in 2018 with subsequent hypothyroidism (now on synthroid), skin cancer (s/p skin lesion removal). Patient was admitted from 4/13/23 - 4/14/23 to [facility] for a diagnosis of inguinal hernia repair. \par \[facility] Hospital chart reviewed and copied as per [facility] Discharge Summary:\par "73M with PMH of HTN, HLD, thyroid cancer s/p thyroidectomy w/ XRT in 2018 with subsequent hypothyroidism (now on synthroid), skin cancer (s/p skin lesion removal) presents for laprascopic b/l inguinal hernia repair with mesh 4/13. Pt was taken to PACU in stable condition. Post op course has been uncomplicated. At the time of discharge, pt is tolerating diet, has return of bowel function, and am able to ambulate freely. Pt also passed his TOV. He is hemodynamically stable and is ready for discharge". \par \par Patient observed via [employer] [facility] and is alert and oriented x 3, in no acute distress. Patient observed sitting comfortably upright in living room chair at time of visit.  Patient states they are feeling "better" today. Patient reports pain post procedure, did not want to take the Percocet at first due to fears of constipation, pain 8/10 when using only Tylenol. Patient decided to try the Percocet and reports the pain went down to a 4/10. Patient states they are eating and drinking well. Patient reports compliance with medications. Patient reports swelling at the tip of the penis due to betaine solution from straight cath procedure done inpatient. Patient states the swelling has improved since discharge but still persists. Patient reports he is urinating normal and having regular bowel movements.  States he intends to call Dr. Whitehead for f/u post surgery and plans to schedule the appointment for later this week. Denies any cough, fever, chills, abdominal pain, palpitations, nausea, vomiting, diarrhea, lightheadedness, dizziness, shortness of breath, or chest pain.\par \par Patient contacted by TCM RN within 48 hours of discharge and d/c instructions reviewed.  Discharge medications were reviewed and reconciled with the current medication list and medications in home. Patient had 48 hour follow up call done by JJ Leon on 4/17/23.\par \par  Cardiology

## 2024-05-05 NOTE — PROCEDURE NOTE - NSICDXPROCEDURE_GEN_ALL_CORE_FT
PROCEDURES:  Arterial cannulation, percutaneous, for monitoring 05-May-2024 12:45:52  Marixa Carbajal  Ultrasound guided venous access 05-May-2024 12:45:59  Marixa Carbajal  
PROCEDURES:  Midline catheter insertion 01-May-2024 15:33:27 Hypotension Griffin Kennedy

## 2024-05-05 NOTE — PROGRESS NOTE ADULT - ASSESSMENT
Assessment and Plan:	  58y Female s/p robotic-assisted hiatal herniorraphy w/ gastric bypass (4/29), diagnostic laparoscopy with released and revised JJ anastomosis narrowing w/ negative leak test (4/30).    NEUROLOGICAL:  #acute pain  - dilauded 0.2mg q4 PRN severe pain  - Toradol 15mg IVP q6 prn (up to 3 doses) ordered 5/3  #Anxiety  - Xanax 0.25mg q8 (consider switching to IV ativan if no improvement with precedex)  - Atarax 25mg IM qhs prn  - precedex up to 0.5 mg/kg/hr  #Depression  - continue home duloxetine  - continue home wellbutrin  - continue home amitriptyline  #Insomnia    - melatonin HS    RESPIRATORY:   #aspiration pneumonia with hypoxic respiratory failure; tachypnea   - on BiPAP 14/8 70% w/ back up rate 14  - On HFNC 35L/70% intermittent when not tolerating Bi-pap  - wean oxygen as tolerated  - Duonebs  - albuterol inhaler  - 3% saline inhalation  - guaifenisen   - Incentive spirometry    CARDIOVASCULAR:   #Acute hypotension likely secondary to dehydration/ enalapril in AM  - resolved  - off nabil since 5/2  #HTN  - HOLDING home antihypertensives (losartan, spironolactone)  - Elevated troponemia --> downtrended, no longer trending  - no EKG changes concerning for ACS  - likely 2/2 hypotension demand ischemia  - cardiology recs appreciated    GASTROINTESTINAL/NUTRITION:   #s/p robotic-assisted hiatal herniorraphy w/ gastric bypass (4/29), diagnostic laparoscopy with released and revised JJ anastomosis narrowing w/ negative leak test (4/30)    - LLQ CECE drain with serosanguinous output  #Diet, NPO, except meds    - aspiration precautions    - head of bed 30 degrees  #GI Prophylaxis  - protonix IV- if change to po, will need protonix granules  #Bowel regimen  - none   -Last BM 5/3  - simethicone QID  - hyoscyamine SL prn for gastric spasms    /RENAL:   #urine output in critically ill    -voiding  #Active diuresis     - Lasix 20 mg x2 on 5/4    - Plan to redose if creatinine does not increase and urine output decreases  #KIKA  - resolving, creatinine improving  - non-oliguric    Labs:          BUN/Cr- 12/0.6  -->,  15/0.6  -->          Electrolytes-(05-05 @ 01:07)Na 141 // K 3.9 // Mg 2.0 // Phos 2.3   #Hypophosphatemia/hypokalemia    - 15 mmol potassium phosphate repleted     HEME/ONC:   #DVT prophylaxis  - Lovenox 40 qd  - SCDs    Labs: Hb/Hct:  9.7/32.5  -->,  10.3/34.2  -->                      Plts:  204  -->,  257  -->                 PTT/INR:      T&S Expires: 5/7    ID:  WBC- 11.65  --->>,  13.56  --->>,  7.90  --->>  Temp trend- 24hrs T(F): 99.7 (05-05 @ 00:00), Max: 99.7 (05-05 @ 00:00)  Antibiotics    - piperacillin/tazobactam IVPB.. 3.375 every 8 hours (5/2 to end 5/8) for severe aspiration pneumonitis  Cultures:    - MRSA negative    ENDOCRINOLOGY:  - POCT fingersticks q6 hours while NPO  - Glucose goal 140-180    MSK:     Activity - Increase As Tolerated:     Time/Priority:  Routine (05-01-24 @ 10:11)  Consider OOB to chair, possible ambulation in AM if respiratory status worsens    LINES/DRAINS:  PIVs, bilateral cephalic midlines (5/1), LLQ drain (5/1)    ADVANCED DIRECTIVES:  Full Code    HCP/Emergency Contact-    INDICATION FOR SICU: hemodynamic instability requiring vasopressor support      DISPO:  SICU Assessment and Plan:	  58y Female s/p robotic-assisted hiatal herniorraphy w/ gastric bypass (4/29), diagnostic laparoscopy with released and revised JJ anastomosis narrowing w/ negative leak test (4/30).    NEUROLOGICAL:  #Anxiety  - Atarax 25mg IM qhs prn  - IV Ativan prn  #Sedation  - precedex  - propofol  #Acute pain  - fentanyl prn for severe pain  #Depression  - HOLDING home duloxetine  - HOLDING home wellbutrin  - HOLDING home amitriptyline    RESPIRATORY:   #Mechanical ventilation secondary to aspiration pneumonia with hypoxic respiratory failure  - VC// 25/100/10  - pending post intubation abg  - Duonebs  - albuterol inhaler  - 3% saline inhalation    CARDIOVASCULAR:   #Acute hypotension s/p intubation  - restarted phenylephrine 5/5  #HTN  - HOLDING home antihypertensives (losartan, spironolactone)  - Elevated troponemia --> downtrended, no longer trending  - no EKG changes concerning for ACS  - likely 2/2 hypotension demand ischemia  - cardiology recs appreciated    GASTROINTESTINAL/NUTRITION:   #s/p robotic-assisted hiatal herniorraphy w/ gastric bypass (4/29), diagnostic laparoscopy with released and revised JJ anastomosis narrowing w/ negative leak test (4/30)    - LLQ CECE drain with serosanguinous output  #Diet, NPO    - per Dr. Montoya, no OGT placement at this time, f/u further plan    - aspiration precautions    - head of bed 30 degrees  #GI Prophylaxis  - protonix IV- if change to po, will need protonix granules  #Bowel regimen  - none   -Last BM 5/3    /RENAL:   #urine output in critically ill    -voiding  #Active diuresis     - Lasix 20 mg x2 on 5/4    - Plan to redose if creatinine does not increase and urine output decreases  #KIKA  - resolving, creatinine improving  - non-oliguric    Labs:          BUN/Cr- 12/0.6  -->,  15/0.6  -->          Electrolytes-(05-05 @ 01:07)Na 141 // K 3.9 // Mg 2.0 // Phos 2.3   #Hypophosphatemia/hypokalemia    - 15 mmol potassium phosphate repleted     HEME/ONC:   #DVT prophylaxis  - Lovenox 40 qd  - SCDs    Labs: Hb/Hct:  9.7/32.5  -->,  10.3/34.2  -->                      Plts:  204  -->,  257  -->                 PTT/INR:      T&S Expires: 5/7    ID:  WBC- 11.65  --->>,  13.56  --->>,  7.90  --->>  Temp trend- 24hrs T(F): 99.7 (05-05 @ 00:00), Max: 99.7 (05-05 @ 00:00)  Antibiotics    - piperacillin/tazobactam IVPB.. 3.375 every 8 hours (5/2 to end 5/8) for severe aspiration pneumonitis  Cultures:    - MRSA negative    ENDOCRINOLOGY:  - POCT fingersticks q6 hours while NPO  - Glucose goal 140-180    MSK:  Activity - Bedrest (05-05-24 @ 07:34) [Active]    LINES/DRAINS:  PIVs, bilateral cephalic midlines (5/1), LLQ drain (5/1)    ADVANCED DIRECTIVES:  Full Code    HCP/Emergency Contact-    INDICATION FOR SICU: hemodynamic instability requiring vasopressor support      DISPO:  SICU

## 2024-05-05 NOTE — PROGRESS NOTE ADULT - CRITICAL CARE ATTENDING COMMENT
Critical Care: [id number]   This patient has a high probability of sudden, clinically significant deterioration, which requires the highest level of physician preparedness to intervene urgently. I managed/supervised life or organ supporting interventions that required frequent physician assessment. I have reviewed and agree with note above. I devoted my full attention to the direct care of this patient for the period of time indicated, including reviewing relevant labs and imaging, discussing treatment plan with the SICU team, nurses, and primary team at the time of multidisciplinary rounds, and reviewing findings with patient and family. This does not include time devoted to teaching any trainees and to performing any procedures.    SARAH BERGMAN 58y Female admitted to SICU with hypotension and increased oxygen requirement s/p lap gastric bypass and hiatal hernia repair and s/p J-J revision on POD1    Increased oxygen requirement 2/2 aspiration pneumonitis 2/2 large-volume emesis observed on intubation for second surgery.   Hypotension resolved.    Issues we are addressing today:    Neuro: multimodal pain meds, restarting home meds as possible, delirium precautions, sleep meds as needed at night  CV: optimize fluid status, holding home BP meds  Respiratory: continue to wean support as possible, hfnc alternating with bipap at this time for aspiration pneumonia  Nutrition: sips of clears  Renal: monitor Is &Os, consider diuresis  ID: zosyn for severe pneumonitis  Lines: d/c as tolerated  Heme: continue to evaluate for acute blood loss anemia   Endocrine: Prevent and treat hyperglycemia with insulin as needed, steroids not indicated and may increase susceptibility for pna  Skin: decub precautions  DVT Prophylaxis   Stress Gastritis Prophylaxis   Mobility: bedrest at this time    The above note is NOT written at the time of rounds and will reflect all changes throughout management of the patient for the day note is written for.    Wolf Burnett MD, D.DALIA.
Critical Care: [id number]   This patient has a high probability of sudden, clinically significant deterioration, which requires the highest level of physician preparedness to intervene urgently. I managed/supervised life or organ supporting interventions that required frequent physician assessment. I have reviewed and agree with note above. I devoted my full attention to the direct care of this patient for the period of time indicated, including reviewing relevant labs and imaging, discussing treatment plan with the SICU team, nurses, and primary team at the time of multidisciplinary rounds, and reviewing findings with patient and family. This does not include time devoted to teaching any trainees and to performing any procedures.    SARAH BERGMAN 58y Female admitted to SICU with hypotension and increased oxygen requirement s/p lap gastric bypass and hiatal hernia repair and s/p J-J revision on POD1    Increased oxygen requirement 2/2 aspiration pneumonitis 2/2 large-volume emesis observed on intubation for second surgery.     Issues we are addressing today:    Neuro: multimodal pain meds, restarting home meds as possible, delirium precautions, sleep meds as needed at night, anxiety meds  CV: optimize fluid status, holding home BP meds  Respiratory: continue to wean support as possible, hfnc alternating with bipap at this time for aspiration pneumonia, very high risk for intubation  Nutrition: sips of clears  Renal: monitor Is &Os, aggressive diuresis as BP permits  ID: zosyn for severe pneumonitis  Lines: d/c as tolerated  Heme: continue to evaluate for acute blood loss anemia   Endocrine: Prevent and treat hyperglycemia with insulin as needed, steroids not indicated and may increase susceptibility for pna  Skin: decub precautions  DVT Prophylaxis   Stress Gastritis Prophylaxis   Mobility: bedrest at this time    The above note is NOT written at the time of rounds and will reflect all changes throughout management of the patient for the day note is written for.    Wolf Burnett MD, D.DALIA.
Critical Care: [id number]   This patient has a high probability of sudden, clinically significant deterioration, which requires the highest level of physician preparedness to intervene urgently. I managed/supervised life or organ supporting interventions that required frequent physician assessment. I have reviewed and agree with note above. I devoted my full attention to the direct care of this patient for the period of time indicated, including reviewing relevant labs and imaging, discussing treatment plan with the SICU team, nurses, and primary team at the time of multidisciplinary rounds, and reviewing findings with patient and family. This does not include time devoted to teaching any trainees and to performing any procedures.    SARAH BERGMAN 58y Female admitted to SICU with hypotension and increased oxygen requirement s/p lap gastric bypass and hiatal hernia repair and s/p J-J revision on POD1    Increased oxygen requirement 2/2 aspiration pneumonitis 2/2 large-volume emesis observed on intubation for second surgery. Has been in acute hypoxic respiratory failure due to pneumonitis, progressed to ARDS over the next few days, now intubated for worsening respiratory failure 2/2 increased WOB.    Issues we are addressing today:    Neuro: minimize sedation as tolerated, multimodal pain meds, home meds as possible, delirium precautions, sleep meds as needed at night, anxiety meds as needed  CV: optimize fluid status, pressors due to sedation  Respiratory: continue to wean support as possible, paralysis for ARDS management, at this point no need for proning as she is tolerating 40% O2, but requires increased PEEP  Nutrition: npo  Renal: monitor Is &Os, continue aggressive diuresis as BP permits  ID: zosyn for severe pneumonitis x7 days  Lines: a-line, bowden in place  Heme: continue to evaluate for acute blood loss anemia   Endocrine: Prevent and treat hyperglycemia with insulin as needed, steroids not indicated and may increase susceptibility for pna  Skin: decub precautions  DVT Prophylaxis   Stress Gastritis Prophylaxis   Mobility: bedrest at this time    The above note is NOT written at the time of rounds and will reflect all changes throughout management of the patient for the day note is written for.    Wolf Burnett MD, D.DALIA.
Critical Care: [id number]   This patient has a high probability of sudden, clinically significant deterioration, which requires the highest level of physician preparedness to intervene urgently. I managed/supervised life or organ supporting interventions that required frequent physician assessment. I have reviewed and agree with note above. I devoted my full attention to the direct care of this patient for the period of time indicated, including reviewing relevant labs and imaging, discussing treatment plan with the SICU team, nurses, and primary team at the time of multidisciplinary rounds, and reviewing findings with patient and family. This does not include time devoted to teaching any trainees and to performing any procedures.    SARAH BERGMAN 58y Female admitted to SICU with hypotension and increased oxygen requirement s/p lap gastric bypass and hiatal hernia repair and s/p J-J revision on POD1    Increased oxygen requirement 2/2 aspiration pneumonitis 2/2 large-volume emesis observed on intubation for second surgery.     Issues we are addressing today:    Neuro: multimodal pain meds, home meds as possible, delirium precautions, sleep meds as needed at night, anxiety meds  CV: optimize fluid status, holding home BP meds  Respiratory: continue to wean support as possible, hfnc alternating with bipap at this time for aspiration pneumonia, very high risk for intubation  Nutrition: sips of clears  Renal: monitor Is &Os, aggressive diuresis as BP permits  ID: zosyn for severe pneumonitis  Lines: d/c as tolerated  Heme: continue to evaluate for acute blood loss anemia   Endocrine: Prevent and treat hyperglycemia with insulin as needed, steroids not indicated and may increase susceptibility for pna  Skin: decub precautions  DVT Prophylaxis   Stress Gastritis Prophylaxis   Mobility: bedrest at this time    The above note is NOT written at the time of rounds and will reflect all changes throughout management of the patient for the day note is written for.    Wolf Burnett MD, D.DALIA.

## 2024-05-05 NOTE — PROGRESS NOTE ADULT - SUBJECTIVE AND OBJECTIVE BOX
SUBJ:      MEDICATIONS  (STANDING):  albuterol/ipratropium for Nebulization 3 milliLiter(s) Nebulizer every 6 hours  artificial  tears Solution 1 Drop(s) Both EYES two times a day  chlorhexidine 0.12% Liquid 15 milliLiter(s) Oral Mucosa every 12 hours  chlorhexidine 2% Cloths 1 Application(s) Topical <User Schedule>  cisatracurium Infusion 3 MICROgram(s)/kG/Min (20.2 mL/Hr) IV Continuous <Continuous>  dexMEDEtomidine Infusion 0.75 MICROgram(s)/kG/Hr (21 mL/Hr) IV Continuous <Continuous>  enoxaparin Injectable 40 milliGRAM(s) SubCutaneous every 24 hours  fentaNYL   Infusion. 0.5 MICROgram(s)/kG/Hr (5.6 mL/Hr) IV Continuous <Continuous>  pantoprazole  Injectable 40 milliGRAM(s) IV Push every 24 hours  phenylephrine    Infusion 0.1 MICROgram(s)/kG/Min (4.2 mL/Hr) IV Continuous <Continuous>  piperacillin/tazobactam IVPB.. 4.5 Gram(s) IV Intermittent every 8 hours  propofol Infusion 10 MICROgram(s)/kG/Min (6.72 mL/Hr) IV Continuous <Continuous>  sodium chloride 3%  Inhalation 4 milliLiter(s) Inhalation every 12 hours    MEDICATIONS  (PRN):  albuterol    90 MICROgram(s) HFA Inhaler 2 Puff(s) Inhalation every 6 hours PRN Shortness of Breath and/or Wheezing            Vital Signs Last 24 Hrs  T(C): 37.9 (05 May 2024 22:00), Max: 38 (05 May 2024 13:00)  T(F): 100.2 (05 May 2024 22:00), Max: 100.4 (05 May 2024 13:00)  HR: 75 (05 May 2024 22:00) (72 - 106)  BP: 84/62 (05 May 2024 11:00) (84/62 - 135/66)  BP(mean): 68 (05 May 2024 11:00) (68 - 94)  RR: 28 (05 May 2024 22:00) (23 - 65)  SpO2: 95% (05 May 2024 22:00) (93% - 100%)    Parameters below as of 05 May 2024 22:00  Patient On (Oxygen Delivery Method): ventilator    O2 Concentration (%): 40     REVIEW OF SYSTEMS:  CONSTITUTIONAL: No fever, weight loss, or fatigue  CARDIOLOGY: PAtient denies chest pain, shortness of breath or syncopal episodes.   RESPIRATORY: denies shortness of breath, wheezeing.   NEUROLOGICAL: NO weakness, no focal deficits to report.  GI: no BRBPR, no N,V,diarrhea.    PSYCHIATRY: normal mood and affect  HEENT: no nasal discharge, no ecchymosis  SKIN: no ecchymosis, no breakdown  MUSCULOSKELETAL: Full range of motion x4.        PHYSICAL EXAM:  · CONSTITUTIONAL:	Well-developed, well nourished    BMI-  ·RESPIRATORY:   airway patent; breath sounds equal; good air movement; respirations non-labored; clear to auscultation bilaterally; no chest wall tenderness; no intercostal retractions; no rales,rhonchi or wheeze  · CARDIOVASCULAR	regular rate and rhythm  no rub  no murmur  normal PMI  · EXTREMITIES: No cyanosis, clubbing or edema  · VASCULAR: 	Equal and normal pulses (carotid, femoral, dorsalis pedis)  	  TELEMETRY:    ECG:    TTE:    LABS:                        10.1   7.56  )-----------( 284      ( 05 May 2024 19:15 )             33.0     05-05    140  |  100  |  17  ----------------------------<  123<H>  3.9   |  30  |  0.6<L>    Ca    8.9      05 May 2024 19:15  Phos  2.8     05-05  Mg     1.9     05-05              I&O's Summary    04 May 2024 07:01  -  05 May 2024 07:00  --------------------------------------------------------  IN: 832.4 mL / OUT: 2115 mL / NET: -1282.6 mL    05 May 2024 07:01  -  05 May 2024 23:26  --------------------------------------------------------  IN: 1563.2 mL / OUT: 1095 mL / NET: 468.2 mL      BNP  RADIOLOGY & ADDITIONAL STUDIES:    IMPRESSION AND PLAN:

## 2024-05-05 NOTE — PROCEDURE NOTE - NSICDXPROBLEMMLMBOX_GEN
IPSTART~^~1~^~[id number]~^~~^~IPEND  PKSTART~^~[id number]~^~PKEND  IPSTART~^~2~^~[id number]~^~~^~IPEND  PKSTART~^~[id number]~^~PKEND

## 2024-05-05 NOTE — PROGRESS NOTE ADULT - SUBJECTIVE AND OBJECTIVE BOX
SARAH BERGMAN   [id number]   [birth year]  58yF    ============================   SICU Consult for hemodynamic instability requiring vasopressor support    HPI   57 y/o female BMI 42.4 PMH HTN, RAFA (not on home CPAP/BiPAP) GERD, pseudogout, migraines, renal stones, depression, asthma; PSH bladder repair, cholecystectomy. On 4/29/24, patient underwent an elective robotic-assisted hiatal herniorraphy w/ gastric bypass. On 4/30, patient was having abdominal pain and vomiting with a concern for possible aspiration. Patient underwent CT abdomen and pelvis showed a narrowed JJ anastomosis. On 4/31, patient was taken back to the OR for a diagnostic laparoscopy and found a narrowed jejunojejunal anastomosis, released and revised w/ negative leak test.    This morning, patient was found to by hypotensive and tachycardic on 4C by blue team. SICU consulted for resuscitation and hemodynamic instabililty. Patient was seen bedside on 4C. Patient is a/o x3, systolic BP in the 80s (improving to the 90s with fluid boluses), -110 sinus rhythm, SpO2 99% on NRFM at 15 LPM. Patient reports lower back pain and lower abdominal pain. Patient denies chest pain, dyspnea (although full inspiratory effort is limited by abdominal pain), nausea, vomiting, lightheadedness, dizziness.    Blue team bedside and says that current abdominal exam is her baseline and has not changed since post-op.       24 Hour Events    5/4  NIGHT  - PM rounds: on bipap 14/8 70%, tachypneic to RR of 50, appears comfotable, on precedex @ 0.4, arousable  - left message with echo, recall in AM  - 15 Kphos repleted  - redose lasix if BP tolerates  - f/u abg for monitoring and comparing CO2    Day  - anxious resulting in tachypnea > 60, precedex drip added  - Precedex off for transient hypotension- resolved, pt remains comfortable  - Can use IV Ativan if necessary in lieu of Xanax  - lasix 20 x 3 again today; next dose 11am-->1500 mL output   - Transitioned to HFNC 90%/60L at 10:15AM > placed back on bipap at 1330  - 20 KCl for K 3.8  - redose lasix if BP sustains   - Mucinex liquid       [X] A ten-point review of systems was otherwise negative except as noted above.  [  ] Due to altered mental status/intubation, subjective information was not attained from the patient. History was obtained, to the extent possible, from review of the chart and collateral sources of information.  ================================================================== SARAH BERGMAN   [id number]   [birth year]  58yF    ============================   SICU Consult for hemodynamic instability requiring vasopressor support    HPI   59 y/o female BMI 42.4 PMH HTN, RAFA (not on home CPAP/BiPAP) GERD, pseudogout, migraines, renal stones, depression, asthma; PSH bladder repair, cholecystectomy. On 4/29/24, patient underwent an elective robotic-assisted hiatal herniorraphy w/ gastric bypass. On 4/30, patient was having abdominal pain and vomiting with a concern for possible aspiration. Patient underwent CT abdomen and pelvis showed a narrowed JJ anastomosis. On 4/31, patient was taken back to the OR for a diagnostic laparoscopy and found a narrowed jejunojejunal anastomosis, released and revised w/ negative leak test.    This morning, patient was found to by hypotensive and tachycardic on 4C by blue team. SICU consulted for resuscitation and hemodynamic instabililty. Patient was seen bedside on 4C. Patient is a/o x3, systolic BP in the 80s (improving to the 90s with fluid boluses), -110 sinus rhythm, SpO2 99% on NRFM at 15 LPM. Patient reports lower back pain and lower abdominal pain. Patient denies chest pain, dyspnea (although full inspiratory effort is limited by abdominal pain), nausea, vomiting, lightheadedness, dizziness.    Blue team bedside and says that current abdominal exam is her baseline and has not changed since post-op.       24 Hour Events    5/4  NIGHT  - PM rounds: on bipap 14/8 70%, tachypneic to RR of 50, appears comfotable, on precedex @ 0.4, arousable  - left message with echo, recall in AM  - 15 Kphos repleted  - redose lasix if BP tolerates  - f/u abg for monitoring and comparing CO2  - increased tachypnea, intubated by anesthsia    Day  - anxious resulting in tachypnea > 60, precedex drip added  - Precedex off for transient hypotension- resolved, pt remains comfortable  - Can use IV Ativan if necessary in lieu of Xanax  - lasix 20 x 3 again today; next dose 11am-->1500 mL output   - Transitioned to HFNC 90%/60L at 10:15AM > placed back on bipap at 1330  - 20 KCl for K 3.8  - redose lasix if BP sustains   - Mucinex liquid       [X] A ten-point review of systems was otherwise negative except as noted above.  [  ] Due to altered mental status/intubation, subjective information was not attained from the patient. History was obtained, to the extent possible, from review of the chart and collateral sources of information.  ==================================================================

## 2024-05-05 NOTE — CHART NOTE - NSCHARTNOTEFT_GEN_A_CORE
SICU team called to bedside by RN due to increasing tachypnea and agitation, pt was attempting to remove her BiPAP. On exam, pt stated she was feeling short of breath, and was tachypneic to RR in 70s. Pt acutely desaturated to 70s% SpO2 when she began to remove BiPAP. Pt placed back on BiPAP, with no improvement in her tachypnea. Pt continued to endorse dyspnea. immediate CXR was performed, showing no significant change from previous CXR. SICU attending made aware of ongoing situation via phone at this time. On continued evaluation of pt, she was unable to speak in full sentences. SICU attending reconsulted, ketamine 10mg IV push given due to pt's PMHx of severe anxiety/panic disorder. No significant change was noted after ketamine administration. Shared decision making was conducted with patient regarding intubation, and pt was verbally in agreement of intubation. Anesthesia notified and came bedside, as well as surgical team. SICU attending remained on phone at this time. Anesthesia intubated pt on first attempt via GlideScope due to pt's worsening respiratory distress/failure. Post intubation sedation started with propofol and Precedex with fentanyl on standby. SICU attending came bedside, pt was placed on mechanical vent by RT.

## 2024-05-05 NOTE — PROGRESS NOTE ADULT - SUBJECTIVE AND OBJECTIVE BOX
GENERAL SURGERY PROGRESS NOTE     SARAH BERGMAN  [address] [place] [address] day :6d    POD:  Procedure: Creation, bypass, gastric, laparoscopic, robot-assisted    Robot-assisted hiatal herniorrhaphy using da Madhuri Xi    Creation, bypass, gastric, laparoscopic, robot-assisted    Diagnostic laparoscopy    Revision of anastomosis of small intestine    Laparoscopic lysis of abdominal adhesions    Upper endoscopy    Release of small bowel obstruction    Midline catheter insertion      Surgical Attending: Enedelia Montoya  Overnight events:    started precedex   lasix 20 IV- good Uop 1.8L   had soft BP systolic in the low 90s and tachypneic- placed back on Bipap from HFNC. turned off precedex.   Bp improved to 100s, saturating improved.     T(F): 99.7 (05-05-24 @ 00:00), Max: 99.7 (05-05-24 @ 00:00)  HR: 100 (05-05-24 @ 00:00) (80 - 107)  BP: 105/53 (05-05-24 @ 00:00) (93/54 - 145/78)  ABP: --  ABP(mean): --  RR: 56 (05-05-24 @ 00:00) (41 - 61)  SpO2: 97% (05-05-24 @ 00:35) (87% - 99%)    IN'S / OUT's:    05-03-24 @ 07:01  -  05-04-24 @ 07:00  --------------------------------------------------------  IN:    IV PiggyBack: 100 mL    IV PiggyBack: 250 mL    IV PiggyBack: 275 mL    Lactated Ringers: 75 mL    Oral Fluid: 270 mL  Total IN: 970 mL    OUT:    Bulb (mL): 110 mL    Voided (mL): 4500 mL  Total OUT: 4610 mL    Total NET: -3640 mL      05-04-24 @ 07:01  -  05-05-24 @ 00:51  --------------------------------------------------------  IN:    Dexmedetomidine: 64.4 mL    Dexmedetomidine: 126 mL    IV PiggyBack: 100 mL    IV PiggyBack: 200 mL  Total IN: 490.4 mL    OUT:    Bulb (mL): 70 mL    Voided (mL): 1870 mL  Total OUT: 1940 mL    Total NET: -1449.6 mL          PHYSICAL EXAM:  GENERAL: appears tired, anxious on BiPAP.   CHEST/LUNG: Clear to auscultation bilaterally on bipap @70%   HEART: tachycardic and regular   ABDOMEN: Soft, Nontender, Nondistended; insicions c/d/i. LLQ CECE drain serosanguinous   EXTREMITIES:  No clubbing, cyanosis, or edema      LABS  Labs:  CAPILLARY BLOOD GLUCOSE      POCT Blood Glucose.: 96 mg/dL (04 May 2024 23:10)  POCT Blood Glucose.: 109 mg/dL (04 May 2024 17:51)  POCT Blood Glucose.: 96 mg/dL (04 May 2024 11:25)  POCT Blood Glucose.: 93 mg/dL (04 May 2024 05:26)                          9.7    13.56 )-----------( 204      ( 04 May 2024 00:58 )             32.5         05-04    137  |  94<L>  |  12  ----------------------------<  118<H>  3.8   |  31  |  0.6<L>      Calcium: 8.5 mg/dL (05-04-24 @ 11:03)      LFTs:     Blood Gas Arterial, Lactate: 1.4 mmol/L (05-04-24 @ 21:30)  Blood Gas Arterial, Lactate: 1.5 mmol/L (05-02-24 @ 06:13)    ABG - ( 04 May 2024 21:30 )  pH: 7.49  /  pCO2: 44    /  pO2: 63    / HCO3: 34    / Base Excess: 9.0   /  SaO2: 95.6            ABG - ( 02 May 2024 06:13 )  pH: 7.43  /  pCO2: 44    /  pO2: 80    / HCO3: 29    / Base Excess: 4.2   /  SaO2: 98.6            ABG - ( 02 May 2024 00:18 )  pH: 7.38  /  pCO2: 47    /  pO2: 60    / HCO3: 28    / Base Excess: 2.0   /  SaO2: 91.9              Coags:            Urinalysis Basic - ( 04 May 2024 11:03 )    Color: x / Appearance: x / SG: x / pH: x  Gluc: 118 mg/dL / Ketone: x  / Bili: x / Urobili: x   Blood: x / Protein: x / Nitrite: x   Leuk Esterase: x / RBC: x / WBC x   Sq Epi: x / Non Sq Epi: x / Bacteria: x            RADIOLOGY & ADDITIONAL TESTS:      A/P:  SARAH EBRGMAN is a 58y F w/ PMHx of  obesity (BMI 43), GERD, RAFA (CPAP), HTN, ASTHMA admitted for elective gastric bypass and hiatal hernia repair, POD 2, Hosp day 3. Now is POD 0 for RTOR for diagnostic lap with JJ anastomosis revision, JASON, and upper endoscopic release of SBO.    PLAN:   - F/u AM chest x-ray  - Monitor respiratory status   - c/w Precedex as tolerated   - Continue with Zosyn   - Cards Rec to get Pulm consult. 2DECHO, and continue negative balance with diuresis.   - pain control   - NPO   - Monitor UOP   - Strict I & Os   - Hemodynamic monitoring   - DVT/GI ppx   - Rest of care per SICU    Disposition:  ***    Above plan to be discussed with Attending Surgeon Dr. Pantoja     Blue Spectra [phone]  TAP (Trauma, Acute care, Pediatrics) Spectra [phone]  Green Spectra [phone]  Vascular [id number]   GENERAL SURGERY PROGRESS NOTE     SARAH BERGMAN  [address] [place] [address] day :6d    POD:  Procedure: Creation, bypass, gastric, laparoscopic, robot-assisted    Robot-assisted hiatal herniorrhaphy using da Madhuri Xi    Creation, bypass, gastric, laparoscopic, robot-assisted    Diagnostic laparoscopy    Revision of anastomosis of small intestine    Laparoscopic lysis of abdominal adhesions    Upper endoscopy    Release of small bowel obstruction    Midline catheter insertion      Surgical Attending: Enedelia Montoya  Overnight events:    started precedex   lasix 20 IV- good Uop 1.8L   had soft BP systolic in the low 90s and tachypneic- placed back on Bipap from HFNC. turned off precedex.   Bp improved to 100s, saturating improved.     T(F): 99.7 (05-05-24 @ 00:00), Max: 99.7 (05-05-24 @ 00:00)  HR: 100 (05-05-24 @ 00:00) (80 - 107)  BP: 105/53 (05-05-24 @ 00:00) (93/54 - 145/78)  ABP: --  ABP(mean): --  RR: 56 (05-05-24 @ 00:00) (41 - 61)  SpO2: 97% (05-05-24 @ 00:35) (87% - 99%)    IN'S / OUT's:    05-03-24 @ 07:01  -  05-04-24 @ 07:00  --------------------------------------------------------  IN:    IV PiggyBack: 100 mL    IV PiggyBack: 250 mL    IV PiggyBack: 275 mL    Lactated Ringers: 75 mL    Oral Fluid: 270 mL  Total IN: 970 mL    OUT:    Bulb (mL): 110 mL    Voided (mL): 4500 mL  Total OUT: 4610 mL    Total NET: -3640 mL      05-04-24 @ 07:01  -  05-05-24 @ 00:51  --------------------------------------------------------  IN:    Dexmedetomidine: 64.4 mL    Dexmedetomidine: 126 mL    IV PiggyBack: 100 mL    IV PiggyBack: 200 mL  Total IN: 490.4 mL    OUT:    Bulb (mL): 70 mL    Voided (mL): 1870 mL  Total OUT: 1940 mL    Total NET: -1449.6 mL          PHYSICAL EXAM:  GENERAL: appears tired, anxious on BiPAP.   CHEST/LUNG: Clear to auscultation bilaterally on bipap @70%   HEART: tachycardic and regular   ABDOMEN: Soft, Nontender, Nondistended; insicions c/d/i. LLQ CECE drain serosanguinous   EXTREMITIES:  No clubbing, cyanosis, or edema      LABS  Labs:  CAPILLARY BLOOD GLUCOSE      POCT Blood Glucose.: 96 mg/dL (04 May 2024 23:10)  POCT Blood Glucose.: 109 mg/dL (04 May 2024 17:51)  POCT Blood Glucose.: 96 mg/dL (04 May 2024 11:25)  POCT Blood Glucose.: 93 mg/dL (04 May 2024 05:26)                          9.7    13.56 )-----------( 204      ( 04 May 2024 00:58 )             32.5         05-04    137  |  94<L>  |  12  ----------------------------<  118<H>  3.8   |  31  |  0.6<L>      Calcium: 8.5 mg/dL (05-04-24 @ 11:03)      LFTs:     Blood Gas Arterial, Lactate: 1.4 mmol/L (05-04-24 @ 21:30)  Blood Gas Arterial, Lactate: 1.5 mmol/L (05-02-24 @ 06:13)    ABG - ( 04 May 2024 21:30 )  pH: 7.49  /  pCO2: 44    /  pO2: 63    / HCO3: 34    / Base Excess: 9.0   /  SaO2: 95.6            ABG - ( 02 May 2024 06:13 )  pH: 7.43  /  pCO2: 44    /  pO2: 80    / HCO3: 29    / Base Excess: 4.2   /  SaO2: 98.6            ABG - ( 02 May 2024 00:18 )  pH: 7.38  /  pCO2: 47    /  pO2: 60    / HCO3: 28    / Base Excess: 2.0   /  SaO2: 91.9              Coags:            Urinalysis Basic - ( 04 May 2024 11:03 )    Color: x / Appearance: x / SG: x / pH: x  Gluc: 118 mg/dL / Ketone: x  / Bili: x / Urobili: x   Blood: x / Protein: x / Nitrite: x   Leuk Esterase: x / RBC: x / WBC x   Sq Epi: x / Non Sq Epi: x / Bacteria: x            RADIOLOGY & ADDITIONAL TESTS:      A/P:  SARAH BERGMAN is a 58y F w/ PMHx of  obesity (BMI 43), GERD, RAFA (CPAP), HTN, ASTHMA admitted for elective gastric bypass and hiatal hernia repair, POD 2, Hosp day 3. Now is POD 0 for RTOR for diagnostic lap with JJ anastomosis revision, JASON, and upper endoscopic release of SBO.    PLAN:   - F/u AM chest x-ray  - Monitor respiratory status   - c/w Precedex as tolerated   - Continue with Zosyn   - Cards Rec to get Pulm consult. 2DECHO, and continue negative balance with diuresis.   - pain control   - NPO   - Monitor UOP   - Strict I & Os   - Hemodynamic monitoring   - DVT/GI ppx   - Rest of care per SICU    Disposition:  ***    Above plan to be discussed with Attending Surgeon Dr. Jan Frank Spectra [id number]   GENERAL SURGERY PROGRESS NOTE     SARAH BERGMAN  [address] [place] [address] day :6d    POD:  Procedure: Creation, bypass, gastric, laparoscopic, robot-assisted  Robot-assisted hiatal herniorrhaphy using da Madhuri Xi  Creation, bypass, gastric, laparoscopic, robot-assisted  Diagnostic laparoscopy  Revision of anastomosis of small intestine  Laparoscopic lysis of abdominal adhesions  Upper endoscopy  Release of small bowel obstruction  Midline catheter insertion      Surgical Attending: Enedelia Montoya  Overnight events:    started precedex   lasix 20 IV- good Uop 1.8L   had soft BP systolic in the low 90s and tachypneic- placed back on Bipap from HFNC. turned off precedex.   Bp improved to 100s, saturating improved.     T(F): 99.7 (05-05-24 @ 00:00), Max: 99.7 (05-05-24 @ 00:00)  HR: 100 (05-05-24 @ 00:00) (80 - 107)  BP: 105/53 (05-05-24 @ 00:00) (93/54 - 145/78)  ABP: --  ABP(mean): --  RR: 56 (05-05-24 @ 00:00) (41 - 61)  SpO2: 97% (05-05-24 @ 00:35) (87% - 99%)    IN'S / OUT's:    05-03-24 @ 07:01  -  05-04-24 @ 07:00  --------------------------------------------------------  IN:    IV PiggyBack: 100 mL    IV PiggyBack: 250 mL    IV PiggyBack: 275 mL    Lactated Ringers: 75 mL    Oral Fluid: 270 mL  Total IN: 970 mL    OUT:    Bulb (mL): 110 mL    Voided (mL): 4500 mL  Total OUT: 4610 mL    Total NET: -3640 mL      05-04-24 @ 07:01  -  05-05-24 @ 00:51  --------------------------------------------------------  IN:    Dexmedetomidine: 64.4 mL    Dexmedetomidine: 126 mL    IV PiggyBack: 100 mL    IV PiggyBack: 200 mL  Total IN: 490.4 mL    OUT:    Bulb (mL): 70 mL    Voided (mL): 1870 mL  Total OUT: 1940 mL    Total NET: -1449.6 mL          PHYSICAL EXAM:  GENERAL: appears tired, anxious on BiPAP.   CHEST/LUNG: Clear to auscultation bilaterally on bipap @70%   HEART: tachycardic and regular   ABDOMEN: Soft, Nontender, Nondistended; insicions c/d/i. LLQ CECE drain serosanguinous   EXTREMITIES:  No clubbing, cyanosis, or edema      LABS  Labs:  CAPILLARY BLOOD GLUCOSE      POCT Blood Glucose.: 96 mg/dL (04 May 2024 23:10)  POCT Blood Glucose.: 109 mg/dL (04 May 2024 17:51)  POCT Blood Glucose.: 96 mg/dL (04 May 2024 11:25)  POCT Blood Glucose.: 93 mg/dL (04 May 2024 05:26)                          9.7    13.56 )-----------( 204      ( 04 May 2024 00:58 )             32.5         05-04    137  |  94<L>  |  12  ----------------------------<  118<H>  3.8   |  31  |  0.6<L>      Calcium: 8.5 mg/dL (05-04-24 @ 11:03)      LFTs:     Blood Gas Arterial, Lactate: 1.4 mmol/L (05-04-24 @ 21:30)  Blood Gas Arterial, Lactate: 1.5 mmol/L (05-02-24 @ 06:13)    ABG - ( 04 May 2024 21:30 )  pH: 7.49  /  pCO2: 44    /  pO2: 63    / HCO3: 34    / Base Excess: 9.0   /  SaO2: 95.6            ABG - ( 02 May 2024 06:13 )  pH: 7.43  /  pCO2: 44    /  pO2: 80    / HCO3: 29    / Base Excess: 4.2   /  SaO2: 98.6            ABG - ( 02 May 2024 00:18 )  pH: 7.38  /  pCO2: 47    /  pO2: 60    / HCO3: 28    / Base Excess: 2.0   /  SaO2: 91.9              Coags:            Urinalysis Basic - ( 04 May 2024 11:03 )    Color: x / Appearance: x / SG: x / pH: x  Gluc: 118 mg/dL / Ketone: x  / Bili: x / Urobili: x   Blood: x / Protein: x / Nitrite: x   Leuk Esterase: x / RBC: x / WBC x   Sq Epi: x / Non Sq Epi: x / Bacteria: x            RADIOLOGY & ADDITIONAL TESTS:      A/P:  SARAH BERGMAN is a 58y F w/ PMHx of  obesity (BMI 43), GERD, RAFA (CPAP), HTN, ASTHMA admitted for elective gastric bypass and hiatal hernia repair, POD 2, Hosp day 3. Now is POD 0 for RTOR for diagnostic lap with JJ anastomosis revision, JASON, and upper endoscopic release of SBO.    PLAN:   - F/u AM chest x-ray  - Monitor respiratory status   - c/w Precedex as tolerated   - Continue with Zosyn   - Cards Rec to get Pulm consult. 2DECHO, and continue negative balance with diuresis.   - pain control   - NPO   - Monitor UOP   - Strict I & Os   - Hemodynamic monitoring   - DVT/GI ppx   - Rest of care per SICU    Disposition:  ***    Above plan to be discussed with Attending Surgeon Dr. Jan Frank Spectra [phone]

## 2024-05-06 ENCOUNTER — RESULT REVIEW (OUTPATIENT)
Age: 59
End: 2024-05-06

## 2024-05-06 LAB
ALBUMIN SERPL ELPH-MCNC: 2.8 G/DL — LOW (ref 3.5–5.2)
ALP SERPL-CCNC: 113 U/L — SIGNIFICANT CHANGE UP (ref 30–115)
ALT FLD-CCNC: 45 U/L — HIGH (ref 0–41)
ANION GAP SERPL CALC-SCNC: 10 MMOL/L — SIGNIFICANT CHANGE UP (ref 7–14)
AST SERPL-CCNC: 23 U/L — SIGNIFICANT CHANGE UP (ref 0–41)
BASOPHILS # BLD AUTO: 0 K/UL — SIGNIFICANT CHANGE UP (ref 0–0.2)
BASOPHILS NFR BLD AUTO: 0 % — SIGNIFICANT CHANGE UP (ref 0–1)
BILIRUB DIRECT SERPL-MCNC: 0.2 MG/DL — SIGNIFICANT CHANGE UP (ref 0–0.3)
BILIRUB INDIRECT FLD-MCNC: 0.1 MG/DL — LOW (ref 0.2–1.2)
BILIRUB SERPL-MCNC: 0.3 MG/DL — SIGNIFICANT CHANGE UP (ref 0.2–1.2)
BUN SERPL-MCNC: 12 MG/DL — SIGNIFICANT CHANGE UP (ref 10–20)
CALCIUM SERPL-MCNC: 8.3 MG/DL — LOW (ref 8.4–10.5)
CHLORIDE SERPL-SCNC: 100 MMOL/L — SIGNIFICANT CHANGE UP (ref 98–110)
CO2 SERPL-SCNC: 29 MMOL/L — SIGNIFICANT CHANGE UP (ref 17–32)
CREAT SERPL-MCNC: 0.5 MG/DL — LOW (ref 0.7–1.5)
EGFR: 109 ML/MIN/1.73M2 — SIGNIFICANT CHANGE UP
EOSINOPHIL # BLD AUTO: 0.88 K/UL — HIGH (ref 0–0.7)
EOSINOPHIL NFR BLD AUTO: 6.1 % — SIGNIFICANT CHANGE UP (ref 0–8)
GAS PNL BLDA: SIGNIFICANT CHANGE UP
GLUCOSE BLDC GLUCOMTR-MCNC: 106 MG/DL — HIGH (ref 70–99)
GLUCOSE BLDC GLUCOMTR-MCNC: 111 MG/DL — HIGH (ref 70–99)
GLUCOSE BLDC GLUCOMTR-MCNC: 113 MG/DL — HIGH (ref 70–99)
GLUCOSE SERPL-MCNC: 119 MG/DL — HIGH (ref 70–99)
HCT VFR BLD CALC: 32 % — LOW (ref 37–47)
HGB BLD-MCNC: 9.5 G/DL — LOW (ref 12–16)
LYMPHOCYTES # BLD AUTO: 1.01 K/UL — LOW (ref 1.2–3.4)
LYMPHOCYTES # BLD AUTO: 7 % — LOW (ref 20.5–51.1)
MAGNESIUM SERPL-MCNC: 1.9 MG/DL — SIGNIFICANT CHANGE UP (ref 1.8–2.4)
MCHC RBC-ENTMCNC: 26.5 PG — LOW (ref 27–31)
MCHC RBC-ENTMCNC: 29.7 G/DL — LOW (ref 32–37)
MCV RBC AUTO: 89.4 FL — SIGNIFICANT CHANGE UP (ref 81–99)
MONOCYTES # BLD AUTO: 0.51 K/UL — SIGNIFICANT CHANGE UP (ref 0.1–0.6)
MONOCYTES NFR BLD AUTO: 3.5 % — SIGNIFICANT CHANGE UP (ref 1.7–9.3)
NEUTROPHILS # BLD AUTO: 11.67 K/UL — HIGH (ref 1.4–6.5)
NEUTROPHILS NFR BLD AUTO: 80.7 % — HIGH (ref 42.2–75.2)
PHOSPHATE SERPL-MCNC: 3.1 MG/DL — SIGNIFICANT CHANGE UP (ref 2.1–4.9)
PLATELET # BLD AUTO: 285 K/UL — SIGNIFICANT CHANGE UP (ref 130–400)
PMV BLD: 10.1 FL — SIGNIFICANT CHANGE UP (ref 7.4–10.4)
POTASSIUM SERPL-MCNC: 3.7 MMOL/L — SIGNIFICANT CHANGE UP (ref 3.5–5)
POTASSIUM SERPL-SCNC: 3.7 MMOL/L — SIGNIFICANT CHANGE UP (ref 3.5–5)
PROT SERPL-MCNC: 5.1 G/DL — LOW (ref 6–8)
RBC # BLD: 3.58 M/UL — LOW (ref 4.2–5.4)
RBC # FLD: 15.7 % — HIGH (ref 11.5–14.5)
SODIUM SERPL-SCNC: 139 MMOL/L — SIGNIFICANT CHANGE UP (ref 135–146)
WBC # BLD: 14.46 K/UL — HIGH (ref 4.8–10.8)
WBC # FLD AUTO: 14.46 K/UL — HIGH (ref 4.8–10.8)

## 2024-05-06 PROCEDURE — 71045 X-RAY EXAM CHEST 1 VIEW: CPT | Mod: 26,77

## 2024-05-06 PROCEDURE — 99291 CRITICAL CARE FIRST HOUR: CPT | Mod: 24,25

## 2024-05-06 PROCEDURE — 71045 X-RAY EXAM CHEST 1 VIEW: CPT | Mod: 26

## 2024-05-06 PROCEDURE — 74018 RADEX ABDOMEN 1 VIEW: CPT | Mod: 26

## 2024-05-06 PROCEDURE — 93306 TTE W/DOPPLER COMPLETE: CPT | Mod: 26

## 2024-05-06 RX ORDER — MAGNESIUM SULFATE 500 MG/ML
1 VIAL (ML) INJECTION ONCE
Refills: 0 | Status: COMPLETED | OUTPATIENT
Start: 2024-05-06 | End: 2024-05-06

## 2024-05-06 RX ORDER — SODIUM CHLORIDE 9 MG/ML
250 INJECTION, SOLUTION INTRAVENOUS ONCE
Refills: 0 | Status: COMPLETED | OUTPATIENT
Start: 2024-05-06 | End: 2024-05-06

## 2024-05-06 RX ORDER — ENOXAPARIN SODIUM 100 MG/ML
40 INJECTION SUBCUTANEOUS EVERY 12 HOURS
Refills: 0 | Status: DISCONTINUED | OUTPATIENT
Start: 2024-05-06 | End: 2024-05-15

## 2024-05-06 RX ORDER — POTASSIUM CHLORIDE 20 MEQ
20 PACKET (EA) ORAL
Refills: 0 | Status: COMPLETED | OUTPATIENT
Start: 2024-05-06 | End: 2024-05-07

## 2024-05-06 RX ADMIN — CHLORHEXIDINE GLUCONATE 1 APPLICATION(S): 213 SOLUTION TOPICAL at 05:12

## 2024-05-06 RX ADMIN — ENOXAPARIN SODIUM 40 MILLIGRAM(S): 100 INJECTION SUBCUTANEOUS at 17:05

## 2024-05-06 RX ADMIN — SODIUM CHLORIDE 750 MILLILITER(S): 9 INJECTION, SOLUTION INTRAVENOUS at 01:28

## 2024-05-06 RX ADMIN — PIPERACILLIN AND TAZOBACTAM 25 GRAM(S): 4; .5 INJECTION, POWDER, LYOPHILIZED, FOR SOLUTION INTRAVENOUS at 05:12

## 2024-05-06 RX ADMIN — ALBUTEROL 2 PUFF(S): 90 AEROSOL, METERED ORAL at 14:03

## 2024-05-06 RX ADMIN — CISATRACURIUM BESYLATE 20.2 MICROGRAM(S)/KG/MIN: 2 INJECTION INTRAVENOUS at 11:23

## 2024-05-06 RX ADMIN — CHLORHEXIDINE GLUCONATE 15 MILLILITER(S): 213 SOLUTION TOPICAL at 05:12

## 2024-05-06 RX ADMIN — SODIUM CHLORIDE 750 MILLILITER(S): 9 INJECTION, SOLUTION INTRAVENOUS at 02:46

## 2024-05-06 RX ADMIN — PIPERACILLIN AND TAZOBACTAM 25 GRAM(S): 4; .5 INJECTION, POWDER, LYOPHILIZED, FOR SOLUTION INTRAVENOUS at 22:19

## 2024-05-06 RX ADMIN — PROPOFOL 6.72 MICROGRAM(S)/KG/MIN: 10 INJECTION, EMULSION INTRAVENOUS at 16:23

## 2024-05-06 RX ADMIN — PIPERACILLIN AND TAZOBACTAM 25 GRAM(S): 4; .5 INJECTION, POWDER, LYOPHILIZED, FOR SOLUTION INTRAVENOUS at 13:58

## 2024-05-06 RX ADMIN — Medication 1 DROP(S): at 05:13

## 2024-05-06 RX ADMIN — Medication 100 GRAM(S): at 22:20

## 2024-05-06 RX ADMIN — Medication 50 MILLIEQUIVALENT(S): at 22:20

## 2024-05-06 RX ADMIN — FENTANYL CITRATE 5.6 MICROGRAM(S)/KG/HR: 50 INJECTION INTRAVENOUS at 16:25

## 2024-05-06 RX ADMIN — PHENYLEPHRINE HYDROCHLORIDE 4.2 MICROGRAM(S)/KG/MIN: 10 INJECTION INTRAVENOUS at 16:22

## 2024-05-06 RX ADMIN — ALBUTEROL 2 PUFF(S): 90 AEROSOL, METERED ORAL at 20:47

## 2024-05-06 RX ADMIN — Medication 1 DROP(S): at 17:06

## 2024-05-06 RX ADMIN — Medication 50 MILLILITER(S): at 02:37

## 2024-05-06 RX ADMIN — PANTOPRAZOLE SODIUM 40 MILLIGRAM(S): 20 TABLET, DELAYED RELEASE ORAL at 17:05

## 2024-05-06 RX ADMIN — CISATRACURIUM BESYLATE 20.2 MICROGRAM(S)/KG/MIN: 2 INJECTION INTRAVENOUS at 18:30

## 2024-05-06 RX ADMIN — PROPOFOL 6.72 MICROGRAM(S)/KG/MIN: 10 INJECTION, EMULSION INTRAVENOUS at 08:39

## 2024-05-06 NOTE — PROGRESS NOTE ADULT - SUBJECTIVE AND OBJECTIVE BOX
SARAH BERGMAN   [id number]   [birth year]  58yF    ============================   SICU Consult for hemodynamic instability requiring vasopressor support    HPI   57 y/o female BMI 42.4 PMH HTN, RAFA (not on home CPAP/BiPAP) GERD, pseudogout, migraines, renal stones, depression, asthma; PSH bladder repair, cholecystectomy. On 4/29/24, patient underwent an elective robotic-assisted hiatal herniorraphy w/ gastric bypass. On 4/30, patient was having abdominal pain and vomiting with a concern for possible aspiration. Patient underwent CT abdomen and pelvis showed a narrowed JJ anastomosis. On 4/31, patient was taken back to the OR for a diagnostic laparoscopy and found a narrowed jejunojejunal anastomosis, released and revised w/ negative leak test.    This morning, patient was found to by hypotensive and tachycardic on 4C by blue team. SICU consulted for resuscitation and hemodynamic instabililty. Patient was seen bedside on 4C. Patient is a/o x3, systolic BP in the 80s (improving to the 90s with fluid boluses), -110 sinus rhythm, SpO2 99% on NRFM at 15 LPM. Patient reports lower back pain and lower abdominal pain. Patient denies chest pain, dyspnea (although full inspiratory effort is limited by abdominal pain), nausea, vomiting, lightheadedness, dizziness.    Blue team bedside and says that current abdominal exam is her baseline and has not changed since post-op.      24 Hour Events  5/5  NIGHT  - PM rounds: Francesco 0.2, abdomen soft, CECE drain with serosanguinous (more serous) output  - may need midline  - midnight ABG  - 1g mag, 15 kphos  - decreased peep to 10 per Dr. Burnett  - albumin 25% 100cc for increasing francesco requirement    Day  - Started on Nimbex gtt with goal train 0/4  - propofol, precedex, fentanyl for sedation; titrate to BIS 40-60  - Vent settings: 320 /26/40/12--> ABG 7.36/57/67/32  - TV increased to 340--> ABG 7.44/48/60/33  - goal PaO2 55-65, goal CO2 max 55   - 2gm Calcium       [] A ten-point review of systems was otherwise negative except as noted above.  [ x ] Due to altered mental status/intubation, subjective information was not attained from the patient. History was obtained, to the extent possible, from review of the chart and collateral sources of information.   SARAH BERGMAN   [id number]   [birth year]  58yF    ============================   SICU Consult for hemodynamic instability requiring vasopressor support    HPI   59 y/o female BMI 42.4 PMH HTN, RAFA (not on home CPAP/BiPAP) GERD, pseudogout, migraines, renal stones, depression, asthma; PSH bladder repair, cholecystectomy. On 4/29/24, patient underwent an elective robotic-assisted hiatal herniorraphy w/ gastric bypass. On 4/30, patient was having abdominal pain and vomiting with a concern for possible aspiration. Patient underwent CT abdomen and pelvis showed a narrowed JJ anastomosis. On 4/31, patient was taken back to the OR for a diagnostic laparoscopy and found a narrowed jejunojejunal anastomosis, released and revised w/ negative leak test.    This morning, patient was found to by hypotensive and tachycardic on 4C by blue team. SICU consulted for resuscitation and hemodynamic instabililty. Patient was seen bedside on 4C. Patient is a/o x3, systolic BP in the 80s (improving to the 90s with fluid boluses), -110 sinus rhythm, SpO2 99% on NRFM at 15 LPM. Patient reports lower back pain and lower abdominal pain. Patient denies chest pain, dyspnea (although full inspiratory effort is limited by abdominal pain), nausea, vomiting, lightheadedness, dizziness.    Blue team bedside and says that current abdominal exam is her baseline and has not changed since post-op.      24 Hour Events  5/5  NIGHT  - PM rounds: Francesco 0.2, abdomen soft, CECE drain with serosanguinous (more serous) output  - may need midline  - midnight ABG  - 1g mag, 15 kphos  - decreased peep to 10 per Dr. Burnett  - albumin 25% 100cc for increasing francesco requirement    Day  - Started on Nimbex gtt with goal train 0/4  - propofol, precedex, fentanyl for sedation; titrate to BIS 40-60  - Vent settings: 320 /26/40/12--> ABG 7.36/57/67/32  - TV increased to 340--> ABG 7.44/48/60/33  - goal PaO2 55-65, goal CO2 max 55   - 2gm Calcium       [] A ten-point review of systems was otherwise negative except as noted above.  [ x ] Due to altered mental status/intubation, subjective information was not attained from the patient. History was obtained, to the extent possible, from review of the chart and collateral sources of information.    Daily     Daily     Diet, NPO (05-05-24 @ 07:27)      CURRENT MEDS:  Neurologic Medications  cisatracurium Infusion 3 MICROgram(s)/kG/Min IV Continuous <Continuous>  dexMEDEtomidine Infusion 0.75 MICROgram(s)/kG/Hr IV Continuous <Continuous>  fentaNYL   Infusion. 0.5 MICROgram(s)/kG/Hr IV Continuous <Continuous>  propofol Infusion 10 MICROgram(s)/kG/Min IV Continuous <Continuous>    Respiratory Medications  albuterol    90 MICROgram(s) HFA Inhaler 2 Puff(s) Inhalation every 6 hours PRN Shortness of Breath and/or Wheezing  albuterol/ipratropium for Nebulization 3 milliLiter(s) Nebulizer every 6 hours  sodium chloride 3%  Inhalation 4 milliLiter(s) Inhalation every 12 hours    Cardiovascular Medications  phenylephrine    Infusion 0.1 MICROgram(s)/kG/Min IV Continuous <Continuous>    Gastrointestinal Medications  pantoprazole  Injectable 40 milliGRAM(s) IV Push every 24 hours    Genitourinary Medications    Hematologic/Oncologic Medications  enoxaparin Injectable 40 milliGRAM(s) SubCutaneous every 24 hours    Antimicrobial/Immunologic Medications  piperacillin/tazobactam IVPB.. 4.5 Gram(s) IV Intermittent every 8 hours    Endocrine/Metabolic Medications    Topical/Other Medications  artificial  tears Solution 1 Drop(s) Both EYES two times a day  chlorhexidine 0.12% Liquid 15 milliLiter(s) Oral Mucosa every 12 hours  chlorhexidine 2% Cloths 1 Application(s) Topical <User Schedule>      ICU Vital Signs Last 24 Hrs  T(C): 37.4 (06 May 2024 06:00), Max: 38 (05 May 2024 13:00)  T(F): 99.3 (06 May 2024 06:00), Max: 100.4 (05 May 2024 13:00)  HR: 88 (06 May 2024 06:45) (70 - 97)  BP: 84/62 (05 May 2024 11:00) (84/62 - 102/67)  BP(mean): 68 (05 May 2024 11:00) (68 - 81)  ABP: 125/69 (06 May 2024 06:45) (87/43 - 130/70)  ABP(mean): 91 (06 May 2024 06:45) (59 - 94)  RR: 26 (06 May 2024 06:45) (26 - 39)  SpO2: 100% (06 May 2024 06:45) (86% - 100%)    O2 Parameters below as of 06 May 2024 06:00  Patient On (Oxygen Delivery Method): ventilator    O2 Concentration (%): 100      ABG - ( 06 May 2024 04:30 )  pH, Arterial: 7.43  pH, Blood: x     /  pCO2: 48    /  pO2: 59    / HCO3: 32    / Base Excess: 6.3   /  SaO2: 91.6      I&O's Summary    05 May 2024 07:01  -  06 May 2024 07:00  --------------------------------------------------------  IN: 2105.3 mL / OUT: 1445 mL / NET: 660.3 mL      I&O's Detail    05 May 2024 07:01  -  06 May 2024 07:00  --------------------------------------------------------  IN:    Cisatracurium: 517.7 mL    Dexmedetomidine: 441 mL    FentaNYL: 134.4 mL    IV PiggyBack: 100 mL    IV PiggyBack: 100 mL    Phenylephrine: 291.4 mL    Propofol: 50.4 mL    Propofol: 470.4 mL  Total IN: 2105.3 mL    OUT:    Bulb (mL): 130 mL    Indwelling Catheter - Urethral (mL): 1315 mL    Voided (mL): 0 mL  Total OUT: 1445 mL    Total NET: 660.3 mL          PHYSICAL EXAM:    General/Neuro: Sedated and Paralyzed  BIS: 40 On Nimbex   Pupils: Reactive      Lungs: On Mechanical ventilation, 340/26/100/10  Bilateral crackles, decreased breath sound at the bases     Cardiovascular :   Regular rate and rhythm.    Cardiac Rhythm: Normal Sinus Rhythm    GI: Abdomen soft, Non-tender, Non-distended.      Wound: Surgical incision clean, dry and intact    Extremities: Extremities warm, pink, well-perfused. Pulses:Rt     Lt        :       Nunez catheter in place.      CXR: reviewed    LABS:  CAPILLARY BLOOD GLUCOSE      POCT Blood Glucose.: 111 mg/dL (06 May 2024 07:28)  POCT Blood Glucose.: 122 mg/dL (05 May 2024 23:01)  POCT Blood Glucose.: 129 mg/dL (05 May 2024 18:58)  POCT Blood Glucose.: 148 mg/dL (05 May 2024 12:54)                          10.1   7.56  )-----------( 284      ( 05 May 2024 19:15 )             33.0       05-05    140  |  100  |  17  ----------------------------<  123<H>  3.9   |  30  |  0.6<L>    Ca    8.9      05 May 2024 19:15  Phos  2.8     05-05  Mg     1.9     05-05              Urinalysis Basic - ( 05 May 2024 19:15 )

## 2024-05-06 NOTE — PROGRESS NOTE ADULT - SUBJECTIVE AND OBJECTIVE BOX
GENERAL SURGERY PROGRESS NOTE     SARAH BERGMAN  58y  Female  Hospital day :8d  POD:7d  Procedure: Robot-assisted hiatal herniorrhaphy using da Madhuri Xi    Creation, bypass, gastric, laparoscopic, robot-assisted    Diagnostic laparoscopy    Revision of anastomosis of small intestine    Laparoscopic lysis of abdominal adhesions    Upper endoscopy    Release of small bowel obstruction    Midline catheter insertion    Arterial cannulation, percutaneous, for monitoring    Ultrasound guided venous access    OVERNIGHT EVENTS:  - Upgraded to SICU for increased work of breathing  - Intubated with vent settings 100/10  - S/p albumin 100cc and LR 250cc bolus x2  - On nabil @ 0.42  - CECE drain in place with serosanguinous output    T(F): 99.5 (05-06-24 @ 08:00), Max: 100.4 (05-05-24 @ 13:00)  HR: 88 (05-06-24 @ 06:45) (70 - 97)  BP: 84/62 (05-05-24 @ 11:00) (84/62 - 102/67)  ABP: 125/69 (05-06-24 @ 06:45) (87/43 - 130/70)  ABP(mean): 91 (05-06-24 @ 06:45) (59 - 94)  RR: 26 (05-06-24 @ 06:45) (26 - 39)  SpO2: 100% (05-06-24 @ 06:45) (86% - 100%)                                                             DRAINS:   05-05-24 @ 07:01  -  05-06-24 @ 07:00  --------------------------------------------------------  OUT: 130 mL    URINE:   05-05-24 @ 07:01  -  05-06-24 @ 07:00  --------------------------------------------------------  OUT: 1315 mL     Indwelling Urethral Catheter:     Connect To:  Straight Drainage/Gravity    Indication:  Urine Output Monitoring in Critically Ill (05-06-24 @ 00:01)    GI proph:  pantoprazole  Injectable 40 milliGRAM(s) IV Push every 24 hours    AC/ proph: enoxaparin Injectable 40 milliGRAM(s) SubCutaneous every 24 hours    ABx: piperacillin/tazobactam IVPB.. 4.5 Gram(s) IV Intermittent every 8 hours      PHYSICAL EXAM:  GENERAL: NAD  CHEST/LUNG: Intubated  HEART: Regular rate and rhythm; On pressors  ABDOMEN: Soft, Nondistended; CECE drain in place with serosanguinous output      LABS  Labs:  CAPILLARY BLOOD GLUCOSE  POCT Blood Glucose.: 111 mg/dL (06 May 2024 07:28)  POCT Blood Glucose.: 122 mg/dL (05 May 2024 23:01)  POCT Blood Glucose.: 129 mg/dL (05 May 2024 18:58)  POCT Blood Glucose.: 148 mg/dL (05 May 2024 12:54)                          10.1   7.56  )-----------( 284      ( 05 May 2024 19:15 )             33.0         05-05    140  |  100  |  17  ----------------------------<  123<H>  3.9   |  30  |  0.6<L>      Calcium: 8.9 mg/dL (05-05-24 @ 19:15)      LFTs:     Blood Gas Arterial, Lactate: 1.3 mmol/L (05-06-24 @ 04:30)  Blood Gas Arterial, Lactate: 1.7 mmol/L (05-06-24 @ 01:46)  Blood Gas Arterial, Lactate: 1.7 mmol/L (05-05-24 @ 17:26)  Blood Gas Arterial, Lactate: 1.6 mmol/L (05-05-24 @ 13:33)  Blood Gas Arterial, Lactate: 1.7 mmol/L (05-05-24 @ 07:50)  Blood Gas Arterial, Lactate: 1.4 mmol/L (05-04-24 @ 21:30)    ABG - ( 06 May 2024 04:30 )  pH: 7.43  /  pCO2: 48    /  pO2: 59    / HCO3: 32    / Base Excess: 6.3   /  SaO2: 91.6            ABG - ( 06 May 2024 01:46 )  pH: 7.50  /  pCO2: 41    /  pO2: 64    / HCO3: 32    / Base Excess: 8.0   /  SaO2: 95.5            ABG - ( 05 May 2024 17:26 )  pH: 7.44  /  pCO2: 48    /  pO2: 60    / HCO3: 33    / Base Excess: 7.2   /  SaO2: 93.2        Urinalysis Basic - ( 05 May 2024 19:15 )    Color: x / Appearance: x / SG: x / pH: x  Gluc: 123 mg/dL / Ketone: x  / Bili: x / Urobili: x   Blood: x / Protein: x / Nitrite: x   Leuk Esterase: x / RBC: x / WBC x   Sq Epi: x / Non Sq Epi: x / Bacteria: x            RADIOLOGY & ADDITIONAL TESTS:  < from: Xray Chest 1 View-PORTABLE IMMEDIATE (Xray Chest 1 View-PORTABLE IMMEDIATE .) (05.05.24 @ 06:52) >  Impression:  Diffuse bilateral opacities, overall unchanged.    < from: Xray Chest 1 View-PORTABLE IMMEDIATE (Xray Chest 1 View-PORTABLE IMMEDIATE .) (05.05.24 @ 06:00) >  Impression:  Diffuse bilateral airspace opacities, unchanged.  No pneumothorax.   GENERAL SURGERY PROGRESS NOTE     SARAH BERGMAN  58y  Female  Hospital day :8d  POD:7d  Procedure: Robot-assisted hiatal herniorrhaphy using da Madhuri Xi  Creation, bypass, gastric, laparoscopic, robot-assisted  Diagnostic laparoscopy  Revision of anastomosis of small intestine  Laparoscopic lysis of abdominal adhesions  Upper endoscopy  Release of small bowel obstruction  Midline catheter insertion  Arterial cannulation, percutaneous, for monitoring  Ultrasound guided venous access    OVERNIGHT EVENTS:  - Intubated with vent settings 100/10  - S/p albumin 100cc and LR 250cc bolus x2  - On nabil @ 0.42- now off  - CECE drain in place with serosanguinous output    T(F): 99.5 (05-06-24 @ 08:00), Max: 100.4 (05-05-24 @ 13:00)  HR: 88 (05-06-24 @ 06:45) (70 - 97)  BP: 84/62 (05-05-24 @ 11:00) (84/62 - 102/67)  ABP: 125/69 (05-06-24 @ 06:45) (87/43 - 130/70)  ABP(mean): 91 (05-06-24 @ 06:45) (59 - 94)  RR: 26 (05-06-24 @ 06:45) (26 - 39)  SpO2: 100% (05-06-24 @ 06:45) (86% - 100%)                                                             DRAINS:   05-05-24 @ 07:01  -  05-06-24 @ 07:00  --------------------------------------------------------  OUT: 130 mL    URINE:   05-05-24 @ 07:01  -  05-06-24 @ 07:00  --------------------------------------------------------  OUT: 1315 mL     Indwelling Urethral Catheter:     Connect To:  Straight Drainage/Gravity    Indication:  Urine Output Monitoring in Critically Ill (05-06-24 @ 00:01)    GI proph:  pantoprazole  Injectable 40 milliGRAM(s) IV Push every 24 hours    AC/ proph: enoxaparin Injectable 40 milliGRAM(s) SubCutaneous every 24 hours    ABx: piperacillin/tazobactam IVPB.. 4.5 Gram(s) IV Intermittent every 8 hours      PHYSICAL EXAM:  GENERAL: NAD  CHEST/LUNG: Intubated  HEART: Regular rate and rhythm; On pressors  ABDOMEN: Soft, Nondistended; CECE drain in place with serosanguinous output      LABS  Labs:  CAPILLARY BLOOD GLUCOSE  POCT Blood Glucose.: 111 mg/dL (06 May 2024 07:28)  POCT Blood Glucose.: 122 mg/dL (05 May 2024 23:01)  POCT Blood Glucose.: 129 mg/dL (05 May 2024 18:58)  POCT Blood Glucose.: 148 mg/dL (05 May 2024 12:54)                          10.1   7.56  )-----------( 284      ( 05 May 2024 19:15 )             33.0         05-05    140  |  100  |  17  ----------------------------<  123<H>  3.9   |  30  |  0.6<L>      Calcium: 8.9 mg/dL (05-05-24 @ 19:15)      LFTs:     Blood Gas Arterial, Lactate: 1.3 mmol/L (05-06-24 @ 04:30)  Blood Gas Arterial, Lactate: 1.7 mmol/L (05-06-24 @ 01:46)  Blood Gas Arterial, Lactate: 1.7 mmol/L (05-05-24 @ 17:26)  Blood Gas Arterial, Lactate: 1.6 mmol/L (05-05-24 @ 13:33)  Blood Gas Arterial, Lactate: 1.7 mmol/L (05-05-24 @ 07:50)  Blood Gas Arterial, Lactate: 1.4 mmol/L (05-04-24 @ 21:30)    ABG - ( 06 May 2024 04:30 )  pH: 7.43  /  pCO2: 48    /  pO2: 59    / HCO3: 32    / Base Excess: 6.3   /  SaO2: 91.6            ABG - ( 06 May 2024 01:46 )  pH: 7.50  /  pCO2: 41    /  pO2: 64    / HCO3: 32    / Base Excess: 8.0   /  SaO2: 95.5            ABG - ( 05 May 2024 17:26 )  pH: 7.44  /  pCO2: 48    /  pO2: 60    / HCO3: 33    / Base Excess: 7.2   /  SaO2: 93.2        Urinalysis Basic - ( 05 May 2024 19:15 )    Color: x / Appearance: x / SG: x / pH: x  Gluc: 123 mg/dL / Ketone: x  / Bili: x / Urobili: x   Blood: x / Protein: x / Nitrite: x   Leuk Esterase: x / RBC: x / WBC x   Sq Epi: x / Non Sq Epi: x / Bacteria: x            RADIOLOGY & ADDITIONAL TESTS:  < from: Xray Chest 1 View-PORTABLE IMMEDIATE (Xray Chest 1 View-PORTABLE IMMEDIATE .) (05.05.24 @ 06:52) >  Impression:  Diffuse bilateral opacities, overall unchanged.    < from: Xray Chest 1 View-PORTABLE IMMEDIATE (Xray Chest 1 View-PORTABLE IMMEDIATE .) (05.05.24 @ 06:00) >  Impression:  Diffuse bilateral airspace opacities, unchanged.  No pneumothorax.

## 2024-05-06 NOTE — PROGRESS NOTE ADULT - ASSESSMENT
ASSESSMENT:  SARAH BERGMAN is a 58y F w/ PMHx of  obesity (BMI 43), GERD, ARFA (CPAP), HTN, ASTHMA admitted for elective gastric bypass and hiatal hernia repair, POD 2, Hosp day 3. Now is POD 0 for RTOR for diagnostic lap with JJ anastomosis revision, JASON, and upper endoscopic release of SBO.    PLAN:  - Monitor respiratory status and vent settings  - Monitor pressor requirements, F/U ABGs  - F/U CECE drain output and appearance  - F/U bedside EGD today  - F/U echocardiogram  - F/U cardiology and pulmonology recommendations  - Rest of care per SICU team  - Surgery to follow    [id number] ASSESSMENT:  SARAH BERGMAN is a 58y F w/ PMHx of  obesity (BMI 43), GERD, RAFA (CPAP), HTN, ASTHMA admitted for elective gastric bypass and hiatal hernia repair, POD 2, Hosp day 3. Now is POD 0 for RTOR for diagnostic lap with JJ anastomosis revision, JASON, and upper endoscopic release of SBO.    PLAN:  - Monitor respiratory status and vent settings  - Monitor pressor requirements, F/U ABGs  - F/U CECE drain output and appearance  - F/U bedside EGD today  - F/U echocardiogram  - F/U cardiology and pulmonology recommendations  - Rest of care per SICU team    [phone]

## 2024-05-06 NOTE — PROGRESS NOTE ADULT - ASSESSMENT
Assessment and Plan:	  58y Female s/p robotic-assisted hiatal herniorraphy w/ gastric bypass (), diagnostic laparoscopy with released and revised JJ anastomosis narrowing w/ negative leak test ().    NEUROLOGICAL:  #Sedation  - precedex  - propofol  #paralytic  - nimbex  - goal train of 0/4  - BIS goal 40-60  #Acute pain  - fentanyl gtt for severe pain  #Depression  - HOLDING home duloxetine  - HOLDING home wellbutrin  - HOLDING home amitriptyline    RESPIRATORY:   #Mechanical ventilation secondary to aspiration pneumonia with hypoxic respiratory failure  - VC//28/40/10  - AB.44/48/60/33  - Goal SPO2 88-92%, goal PaCO2 < 55  - Duonebs  - albuterol inhaler    CARDIOVASCULAR:   #Acute hypotension s/p intubation  - restarted phenylephrine   #HTN  - HOLDING home antihypertensives (losartan, spironolactone)  - Elevated troponemia --> downtrended, no longer trending  - no EKG changes concerning for ACS  - likely 2/2 hypotension demand ischemia  - cardiology recs appreciated    GASTROINTESTINAL/NUTRITION:   #s/p robotic-assisted hiatal herniorraphy w/ gastric bypass (), diagnostic laparoscopy with released and revised JJ anastomosis narrowing w/ negative leak test ()    - LLQ CECE drain with serosanguinous output (more serous)  #Diet, NPO    - per Dr. Montoya, no OGT placement at this time, f/u further plan    - aspiration precautions    - head of bed 30 degrees  #GI Prophylaxis  - protonix IV- if change to PO, will need protonix granules  #Bowel regimen  - none   -Last BM     /RENAL:   #urine output in critically ill    -bowden placed   #Active diuresis    - Lasix 20 mg x2 on    - Lasix 20 IV x 1   #KIKA  - resolving, creatinine improving  - non-oliguric    Labs:          BUN/Cr- 15/0.6  -->,  17/0.6  -->          Electrolytes-(- @ 19:15)Na 140 // K 3.9 // Mg 1.9 // Phos 2.8 (magnesium, potassium, and phosphate repleted)    HEME/ONC:   #DVT prophylaxis  - Lovenox 40 qd  - SCDs    Labs: Hb/Hct:  10.3/34.2  -->,  10.1/33.0  -->                      Plts:  257  -->,  284  -->                 PTT/INR:      T&S Expires:     ID:  WBC- 13.56  --->>,  7.90  --->>,  7.56  --->>  Temp trend- 24hrs T(F): 100.4 ( @ 00:00), Max: 100.4 ( @ 13:00)  Antibiotics    - piperacillin/tazobactam IVPB.. 3.375 every 8 hours ( to end ) for severe aspiration pneumonitis  Cultures:    - MRSA negative    ENDOCRINOLOGY:  - POCT fingersticks q6 hours while NPO  - Glucose goal 140-180    MSK:  Activity - Bedrest (24 @ 07:34) [Active]    LINES/DRAINS:  PIVs, bilateral cephalic midlines (), LLQ drain (), R radial a line (-     ADVANCED DIRECTIVES:  Full Code    HCP/Emergency Contact-    INDICATION FOR SICU: hemodynamic instability requiring vasopressor support      DISPO:  SICU

## 2024-05-06 NOTE — PHYSICAL THERAPY INITIAL EVALUATION ADULT - SPECIFY REASON(S)
15:36. Hold PT 2* to pt intubated and not able to participate with PT at this time. Will f/u with PT as appropriate.

## 2024-05-07 LAB
ALBUMIN SERPL ELPH-MCNC: 2.8 G/DL — LOW (ref 3.5–5.2)
ALP SERPL-CCNC: 111 U/L — SIGNIFICANT CHANGE UP (ref 30–115)
ALT FLD-CCNC: 37 U/L — SIGNIFICANT CHANGE UP (ref 0–41)
ANION GAP SERPL CALC-SCNC: 10 MMOL/L — SIGNIFICANT CHANGE UP (ref 7–14)
AST SERPL-CCNC: 26 U/L — SIGNIFICANT CHANGE UP (ref 0–41)
BASE EXCESS BLDA CALC-SCNC: 6.9 MMOL/L — HIGH (ref -2–3)
BASOPHILS # BLD AUTO: 0.09 K/UL — SIGNIFICANT CHANGE UP (ref 0–0.2)
BASOPHILS NFR BLD AUTO: 0.7 % — SIGNIFICANT CHANGE UP (ref 0–1)
BILIRUB DIRECT SERPL-MCNC: 0.2 MG/DL — SIGNIFICANT CHANGE UP (ref 0–0.3)
BILIRUB INDIRECT FLD-MCNC: 0.1 MG/DL — LOW (ref 0.2–1.2)
BILIRUB SERPL-MCNC: 0.3 MG/DL — SIGNIFICANT CHANGE UP (ref 0.2–1.2)
BUN SERPL-MCNC: 13 MG/DL — SIGNIFICANT CHANGE UP (ref 10–20)
CALCIUM SERPL-MCNC: 8.3 MG/DL — LOW (ref 8.4–10.4)
CHLORIDE SERPL-SCNC: 101 MMOL/L — SIGNIFICANT CHANGE UP (ref 98–110)
CK SERPL-CCNC: 672 U/L — HIGH (ref 0–225)
CO2 SERPL-SCNC: 30 MMOL/L — SIGNIFICANT CHANGE UP (ref 17–32)
CREAT SERPL-MCNC: 0.7 MG/DL — SIGNIFICANT CHANGE UP (ref 0.7–1.5)
EGFR: 100 ML/MIN/1.73M2 — SIGNIFICANT CHANGE UP
EOSINOPHIL # BLD AUTO: 0.6 K/UL — SIGNIFICANT CHANGE UP (ref 0–0.7)
EOSINOPHIL NFR BLD AUTO: 4.4 % — SIGNIFICANT CHANGE UP (ref 0–8)
GAS PNL BLDA: SIGNIFICANT CHANGE UP
GLUCOSE BLDC GLUCOMTR-MCNC: 120 MG/DL — HIGH (ref 70–99)
GLUCOSE BLDC GLUCOMTR-MCNC: 137 MG/DL — HIGH (ref 70–99)
GLUCOSE SERPL-MCNC: 128 MG/DL — HIGH (ref 70–99)
GRAM STN FLD: ABNORMAL
GRAM STN FLD: ABNORMAL
HCO3 BLDA-SCNC: 33 MMOL/L — HIGH (ref 21–28)
HCT VFR BLD CALC: 35.3 % — LOW (ref 37–47)
HGB BLD-MCNC: 10.5 G/DL — LOW (ref 12–16)
HOROWITZ INDEX BLDA+IHG-RTO: 60 — SIGNIFICANT CHANGE UP
IMM GRANULOCYTES NFR BLD AUTO: 5.1 % — HIGH (ref 0.1–0.3)
LYMPHOCYTES # BLD AUTO: 1.72 K/UL — SIGNIFICANT CHANGE UP (ref 1.2–3.4)
LYMPHOCYTES # BLD AUTO: 12.5 % — LOW (ref 20.5–51.1)
MAGNESIUM SERPL-MCNC: 2 MG/DL — SIGNIFICANT CHANGE UP (ref 1.8–2.4)
MCHC RBC-ENTMCNC: 26.4 PG — LOW (ref 27–31)
MCHC RBC-ENTMCNC: 29.7 G/DL — LOW (ref 32–37)
MCV RBC AUTO: 88.9 FL — SIGNIFICANT CHANGE UP (ref 81–99)
MONOCYTES # BLD AUTO: 1.07 K/UL — HIGH (ref 0.1–0.6)
MONOCYTES NFR BLD AUTO: 7.8 % — SIGNIFICANT CHANGE UP (ref 1.7–9.3)
NEUTROPHILS # BLD AUTO: 9.53 K/UL — HIGH (ref 1.4–6.5)
NEUTROPHILS NFR BLD AUTO: 69.5 % — SIGNIFICANT CHANGE UP (ref 42.2–75.2)
NRBC # BLD: 0 /100 WBCS — SIGNIFICANT CHANGE UP (ref 0–0)
PCO2 BLDA: 52 MMHG — HIGH (ref 32–45)
PH BLDA: 7.41 — SIGNIFICANT CHANGE UP (ref 7.35–7.45)
PHOSPHATE SERPL-MCNC: 3 MG/DL — SIGNIFICANT CHANGE UP (ref 2.1–4.9)
PLATELET # BLD AUTO: 372 K/UL — SIGNIFICANT CHANGE UP (ref 130–400)
PMV BLD: 9.9 FL — SIGNIFICANT CHANGE UP (ref 7.4–10.4)
PO2 BLDA: 78 MMHG — LOW (ref 83–108)
POTASSIUM SERPL-MCNC: 4.5 MMOL/L — SIGNIFICANT CHANGE UP (ref 3.5–5)
POTASSIUM SERPL-SCNC: 4.5 MMOL/L — SIGNIFICANT CHANGE UP (ref 3.5–5)
PROT SERPL-MCNC: 5.6 G/DL — LOW (ref 6–8)
RBC # BLD: 3.97 M/UL — LOW (ref 4.2–5.4)
RBC # FLD: 15.6 % — HIGH (ref 11.5–14.5)
SAO2 % BLDA: 97 % — SIGNIFICANT CHANGE UP (ref 94–98)
SODIUM SERPL-SCNC: 141 MMOL/L — SIGNIFICANT CHANGE UP (ref 135–146)
SPECIMEN SOURCE: SIGNIFICANT CHANGE UP
SPECIMEN SOURCE: SIGNIFICANT CHANGE UP
TRIGL SERPL-MCNC: 149 MG/DL — SIGNIFICANT CHANGE UP
WBC # BLD: 13.71 K/UL — HIGH (ref 4.8–10.8)
WBC # FLD AUTO: 13.71 K/UL — HIGH (ref 4.8–10.8)

## 2024-05-07 PROCEDURE — 99291 CRITICAL CARE FIRST HOUR: CPT | Mod: 24,25

## 2024-05-07 PROCEDURE — 71045 X-RAY EXAM CHEST 1 VIEW: CPT | Mod: 26,77

## 2024-05-07 PROCEDURE — 71045 X-RAY EXAM CHEST 1 VIEW: CPT | Mod: 26

## 2024-05-07 RX ORDER — DEXMEDETOMIDINE HYDROCHLORIDE IN 0.9% SODIUM CHLORIDE 4 UG/ML
0.2 INJECTION INTRAVENOUS
Qty: 400 | Refills: 0 | Status: DISCONTINUED | OUTPATIENT
Start: 2024-05-07 | End: 2024-05-08

## 2024-05-07 RX ORDER — FENTANYL CITRATE 50 UG/ML
0.5 INJECTION INTRAVENOUS
Qty: 2500 | Refills: 0 | Status: DISCONTINUED | OUTPATIENT
Start: 2024-05-07 | End: 2024-05-10

## 2024-05-07 RX ORDER — FUROSEMIDE 40 MG
10 TABLET ORAL ONCE
Refills: 0 | Status: COMPLETED | OUTPATIENT
Start: 2024-05-07 | End: 2024-05-07

## 2024-05-07 RX ORDER — FUROSEMIDE 40 MG
20 TABLET ORAL ONCE
Refills: 0 | Status: DISCONTINUED | OUTPATIENT
Start: 2024-05-07 | End: 2024-05-07

## 2024-05-07 RX ORDER — PROPOFOL 10 MG/ML
10 INJECTION, EMULSION INTRAVENOUS
Qty: 1000 | Refills: 0 | Status: DISCONTINUED | OUTPATIENT
Start: 2024-05-07 | End: 2024-05-10

## 2024-05-07 RX ORDER — MIDAZOLAM HYDROCHLORIDE 1 MG/ML
2 INJECTION, SOLUTION INTRAMUSCULAR; INTRAVENOUS ONCE
Refills: 0 | Status: DISCONTINUED | OUTPATIENT
Start: 2024-05-07 | End: 2024-05-07

## 2024-05-07 RX ORDER — ACETAMINOPHEN 500 MG
1000 TABLET ORAL ONCE
Refills: 0 | Status: COMPLETED | OUTPATIENT
Start: 2024-05-07 | End: 2024-05-07

## 2024-05-07 RX ADMIN — PIPERACILLIN AND TAZOBACTAM 25 GRAM(S): 4; .5 INJECTION, POWDER, LYOPHILIZED, FOR SOLUTION INTRAVENOUS at 13:09

## 2024-05-07 RX ADMIN — Medication 1 DROP(S): at 17:12

## 2024-05-07 RX ADMIN — PROPOFOL 6.72 MICROGRAM(S)/KG/MIN: 10 INJECTION, EMULSION INTRAVENOUS at 17:38

## 2024-05-07 RX ADMIN — Medication 10 MILLIGRAM(S): at 09:37

## 2024-05-07 RX ADMIN — FENTANYL CITRATE 5.6 MICROGRAM(S)/KG/HR: 50 INJECTION INTRAVENOUS at 14:19

## 2024-05-07 RX ADMIN — PANTOPRAZOLE SODIUM 40 MILLIGRAM(S): 20 TABLET, DELAYED RELEASE ORAL at 17:29

## 2024-05-07 RX ADMIN — ALBUTEROL 2 PUFF(S): 90 AEROSOL, METERED ORAL at 13:31

## 2024-05-07 RX ADMIN — ALBUTEROL 2 PUFF(S): 90 AEROSOL, METERED ORAL at 08:00

## 2024-05-07 RX ADMIN — ENOXAPARIN SODIUM 40 MILLIGRAM(S): 100 INJECTION SUBCUTANEOUS at 17:12

## 2024-05-07 RX ADMIN — CHLORHEXIDINE GLUCONATE 1 APPLICATION(S): 213 SOLUTION TOPICAL at 05:30

## 2024-05-07 RX ADMIN — PIPERACILLIN AND TAZOBACTAM 25 GRAM(S): 4; .5 INJECTION, POWDER, LYOPHILIZED, FOR SOLUTION INTRAVENOUS at 05:30

## 2024-05-07 RX ADMIN — Medication 400 MILLIGRAM(S): at 22:46

## 2024-05-07 RX ADMIN — Medication 1000 MILLIGRAM(S): at 23:01

## 2024-05-07 RX ADMIN — ENOXAPARIN SODIUM 40 MILLIGRAM(S): 100 INJECTION SUBCUTANEOUS at 05:30

## 2024-05-07 RX ADMIN — Medication 1 DROP(S): at 05:33

## 2024-05-07 RX ADMIN — MIDAZOLAM HYDROCHLORIDE 2 MILLIGRAM(S): 1 INJECTION, SOLUTION INTRAMUSCULAR; INTRAVENOUS at 17:29

## 2024-05-07 RX ADMIN — Medication 50 MILLIEQUIVALENT(S): at 00:44

## 2024-05-07 RX ADMIN — DEXMEDETOMIDINE HYDROCHLORIDE IN 0.9% SODIUM CHLORIDE 5.6 MICROGRAM(S)/KG/HR: 4 INJECTION INTRAVENOUS at 17:37

## 2024-05-07 RX ADMIN — FENTANYL CITRATE 5.6 MICROGRAM(S)/KG/HR: 50 INJECTION INTRAVENOUS at 17:39

## 2024-05-07 NOTE — PROGRESS NOTE ADULT - ASSESSMENT
ASSESSMENT:  SARAH BERGMAN is a 58y F w/ PMHx of  obesity (BMI 43), GERD, RAFA (CPAP), HTN, ASTHMA admitted for elective gastric bypass and hiatal hernia repair, POD 2, Hosp day 3. Now is POD 0 for RTOR for diagnostic lap with JJ anastomosis revision, JASON, and upper endoscopic release of SBO.    PLAN:  - Monitor respiratory status and vent settings  - Monitor pressor requirements, F/U ABGs  - F/U CECE drain output and appearance  - Continue tube feeds through the NJ tube, advance to goal as tolerated  - Echo: LVEF 70%, grade I diastolic dysfunction, mild to moderate TR, severe pulmonary HTN  - F/U sputum cultures  - Monitor labs, replete as needed  - Rest of care per SICU team    [id number]

## 2024-05-07 NOTE — PROGRESS NOTE ADULT - SUBJECTIVE AND OBJECTIVE BOX
GENERAL SURGERY PROGRESS NOTE     SARAH BERGMAN  58y  Female  Hospital day :9d  POD:8d  Procedure: Robot-assisted hiatal herniorrhaphy using da Madhuri Xi    Creation, bypass, gastric, laparoscopic, robot-assisted    Diagnostic laparoscopy    Revision of anastomosis of small intestine    Laparoscopic lysis of abdominal adhesions    Upper endoscopy    Release of small bowel obstruction    Midline catheter insertion    Arterial cannulation, percutaneous, for monitoring    Ultrasound guided venous access    OVERNIGHT EVENTS:  - Paralyzed on nimbex and sedated on propofol  - CECE drain in place with serous output  - Abdomen soft, significant rhonchi bilaterally  - 40 mEq KCl and 1g Mg  - On nabil 0.2 and intubated with vent settings 70/10    T(F): 98.2 (05-07-24 @ 04:00), Max: 99.9 (05-06-24 @ 12:00)  HR: 101 (05-07-24 @ 06:00) (88 - 112)  BP: 108/71 (05-07-24 @ 06:00) (78/51 - 149/77)  ABP: 96/60 (05-07-24 @ 06:00) (67/56 - 142/63)  ABP(mean): 74 (05-07-24 @ 06:00) (45 - 98)  RR: 30 (05-07-24 @ 06:00) (26 - 30)  SpO2: 94% (05-07-24 @ 06:00) (84% - 100%)                                                      DRAINS:   05-05-24 @ 07:01  -  05-06-24 @ 07:00  --------------------------------------------------------  OUT: 130 mL    URINE:   05-05-24 @ 07:01  -  05-06-24 @ 07:00  --------------------------------------------------------  OUT: 1315 mL     Indwelling Urethral Catheter:     Connect To:  Straight Drainage/Gravity    Indication:  Urine Output Monitoring in Critically Ill (05-07-24 @ 03:29)    GI proph:  pantoprazole  Injectable 40 milliGRAM(s) IV Push every 24 hours    AC/ proph: enoxaparin Injectable 40 milliGRAM(s) SubCutaneous every 12 hours    ABx: piperacillin/tazobactam IVPB.. 4.5 Gram(s) IV Intermittent every 8 hours      PHYSICAL EXAM:  GENERAL: NAD  CHEST/LUNG: Intubated  HEART: Regular rate and rhythm; on nabil  ABDOMEN: Soft, Nontender, Nondistended;     LABS  Labs:  CAPILLARY BLOOD GLUCOSE      POCT Blood Glucose.: 120 mg/dL (07 May 2024 05:44)  POCT Blood Glucose.: 113 mg/dL (06 May 2024 16:53)  POCT Blood Glucose.: 106 mg/dL (06 May 2024 11:35)  POCT Blood Glucose.: 111 mg/dL (06 May 2024 07:28)                          9.5    14.46 )-----------( 285      ( 06 May 2024 20:48 )             32.0       Auto Neutrophil %: 80.7 % (05-06-24 @ 20:48)    05-06    139  |  100  |  12  ----------------------------<  119<H>  3.7   |  29  |  0.5<L>      Calcium: 8.3 mg/dL (05-06-24 @ 20:48)      LFTs:             5.1  | 0.3  | 23       ------------------[113     ( 06 May 2024 20:48 )  2.8  | 0.2  | 45          Lipase:x      Amylase:x         Blood Gas Arterial, Lactate: 0.9 mmol/L (05-07-24 @ 04:30)  Blood Gas Arterial, Lactate: 1.0 mmol/L (05-06-24 @ 20:14)  Blood Gas Arterial, Lactate: 1.2 mmol/L (05-06-24 @ 15:48)  Blood Gas Arterial, Lactate: 1.1 mmol/L (05-06-24 @ 10:36)  Blood Gas Arterial, Lactate: 1.0 mmol/L (05-06-24 @ 09:24)  Blood Gas Arterial, Lactate: 1.3 mmol/L (05-06-24 @ 04:30)  Blood Gas Arterial, Lactate: 1.7 mmol/L (05-06-24 @ 01:46)  Blood Gas Arterial, Lactate: 1.7 mmol/L (05-05-24 @ 17:26)  Blood Gas Arterial, Lactate: 1.6 mmol/L (05-05-24 @ 13:33)  Blood Gas Arterial, Lactate: 1.7 mmol/L (05-05-24 @ 07:50)  Blood Gas Arterial, Lactate: 1.4 mmol/L (05-04-24 @ 21:30)    ABG - ( 07 May 2024 04:30 )  pH: 7.38  /  pCO2: 54    /  pO2: 92    / HCO3: 32    / Base Excess: 5.6   /  SaO2: 98.6            ABG - ( 06 May 2024 20:14 )  pH: 7.38  /  pCO2: 49    /  pO2: 125   / HCO3: 29    / Base Excess: 3.3   /  SaO2: 99.3            ABG - ( 06 May 2024 15:48 )  pH: 7.38  /  pCO2: 53    /  pO2: 82    / HCO3: 31    / Base Excess: 4.9   /  SaO2: 98.8            Urinalysis Basic - ( 06 May 2024 20:48 )    Color: x / Appearance: x / SG: x / pH: x  Gluc: 119 mg/dL / Ketone: x  / Bili: x / Urobili: x   Blood: x / Protein: x / Nitrite: x   Leuk Esterase: x / RBC: x / WBC x   Sq Epi: x / Non Sq Epi: x / Bacteria: x            RADIOLOGY & ADDITIONAL TESTS:  < from: Xray Kidney Ureter Bladder (05.06.24 @ 13:17) >  Findings/  impression:  Enteric catheter extends below the hemidiaphragm.  Tubing overlies the abdomen. Air-filled loops of bowel are seen. Findings   most consistent with ileus.

## 2024-05-07 NOTE — PROGRESS NOTE ADULT - ASSESSMENT
Assessment and Plan:	  58y Female s/p robotic-assisted hiatal herniorraphy w/ gastric bypass (4/29), diagnostic laparoscopy with released and revised JJ anastomosis narrowing w/ negative leak test (4/30).    NEUROLOGICAL:  #Sedation  - propofol  #paralytic  - nimbex  - goal train of 0/4  - BIS goal 40-60  #Acute pain  - fentanyl gtt for severe pain  #Depression  - HOLDING home duloxetine  - HOLDING home wellbutrin  - HOLDING home amitriptyline    RESPIRATORY:   #Mechanical ventilation secondary to aspiration pneumonia with hypoxic respiratory failure    -intubated 5/5, LL 23, ET 7.5  - PRVC 340/30/70/10   Duonebs  - albuterol inhaler    CARDIOVASCULAR:   #Acute hypotension secondary to sedation requirement    -phenylephrine <1mcg/kg/min  #HTN  - HOLDING home antihypertensives (losartan, spironolactone)  - Elevated troponemia --> downtrended, no longer trending   cardiology recs appreciated  ECHO (5/6): EF 70%/G1DD/TR/severe pulmonary HTN    GASTROINTESTINAL/NUTRITION:   #s/p robotic-assisted hiatal herniorraphy w/ gastric bypass (4/29),   # s/p diagnostic laparoscopy with released and revised JJ anastomosis narrowing w/ negative leak test (4/30)    -Diet NPO x/rx    - LLQ CECE drain with serosanguinous output     -NG tube placement by Dr. Montoya 65cm    - aspiration precautions    - head of bed 30 degrees  #GI Prophylaxis  - protonix IV- if change to PO, will need protonix granules  #Bowel regimen    -Last BM 5/5    /RENAL:   #urine output in critically ill    -bowden placed 5/5  #intermittent diuresis    - last dose lasix 20mg IV 5/6    #KIKA - resolved  - non-oliguric    Labs:          BUN/Cr- 17/0.6  -->,  12/0.5  -->          Electrolytes-(05-06 @ 20:48)Na 139 // K 3.7 // Mg 1.9 // Phos 3.1       HEME/ONC:   #DVT prophylaxis  - Lovenox 40 q12  - SCDs    Labs: Hb/Hct:  10.1/33.0  -->,  9.5/32.0  -->                      Plts:  284  -->,  285  -->                 PTT/INR:      T&S Expires: 5/7    ID:  WBC- 7.90  --->>,  7.56  --->>,  14.46  --->>  Temp trend- 24hrs T(F): 98.1 (05-07 @ 00:00), Max: 100 (05-06 @ 04:00)  Antibiotics    - piperacillin/tazobactam IVPB.. 3.375 every 8 hours (5/2 to end 5/8) for severe aspiration pneumonitis  Cultures:    - MRSA negative    - sputum culture pending    ENDOCRINOLOGY:  - POCT fingersticks q6 hours while NPO  - Glucose goal 140-180    MSK:  Activity - Bedrest (05-05-24 @ 07:34) [Active]    LINES/DRAINS:  PIVs, bilateral cephalic midlines (5/1), LLQ drain (5/1), R radial arterial line (5/5-), bowden (5/5)     ADVANCED DIRECTIVES:  Full Code    HCP/Emergency Contact-    INDICATION FOR SICU: hemodynamic instability requiring vasopressor support      DISPO:  SICU

## 2024-05-07 NOTE — PROGRESS NOTE ADULT - SUBJECTIVE AND OBJECTIVE BOX
SARAH BERGMAN   [id number]   [birth year]  58yF    ============================   SICU Consult for hemodynamic instability requiring vasopressor support    HPI   57 y/o female BMI 42.4 PMH HTN, RAFA (not on home CPAP/BiPAP) GERD, pseudogout, migraines, renal stones, depression, asthma; PSH bladder repair, cholecystectomy. On 4/29/24, patient underwent an elective robotic-assisted hiatal herniorraphy w/ gastric bypass. On 4/30, patient was having abdominal pain and vomiting with a concern for possible aspiration. Patient underwent CT abdomen and pelvis showed a narrowed JJ anastomosis. On 4/31, patient was taken back to the OR for a diagnostic laparoscopy and found a narrowed jejunojejunal anastomosis, released and revised w/ negative leak test.    This morning, patient was found to by hypotensive and tachycardic on 4C by blue team. SICU consulted for resuscitation and hemodynamic instabililty. Patient was seen bedside on 4C. Patient is a/o x3, systolic BP in the 80s (improving to the 90s with fluid boluses), -110 sinus rhythm, SpO2 99% on NRFM at 15 LPM. Patient reports lower back pain and lower abdominal pain. Patient denies chest pain, dyspnea (although full inspiratory effort is limited by abdominal pain), nausea, vomiting, lightheadedness, dizziness.    Blue team bedside and says that current abdominal exam is her baseline and has not changed since post-op.      24 Hour Events      [] A ten-point review of systems was otherwise negative except as noted above.  [ x ] Due to altered mental status/intubation, subjective information was not attained from the patient. History was obtained, to the extent possible, from review of the chart and collateral sources of information.   SARAH BERGMAN   [id number]   [birth year]  58yF    ============================   SICU Consult for hemodynamic instability requiring vasopressor support    HPI   57 y/o female BMI 42.4 PMH HTN, RAFA (not on home CPAP/BiPAP) GERD, pseudogout, migraines, renal stones, depression, asthma; PSH bladder repair, cholecystectomy. On 4/29/24, patient underwent an elective robotic-assisted hiatal herniorraphy w/ gastric bypass. On 4/30, patient was having abdominal pain and vomiting with a concern for possible aspiration. Patient underwent CT abdomen and pelvis showed a narrowed JJ anastomosis. On 4/31, patient was taken back to the OR for a diagnostic laparoscopy and found a narrowed jejunojejunal anastomosis, released and revised w/ negative leak test.    This morning, patient was found to by hypotensive and tachycardic on 4C by blue team. SICU consulted for resuscitation and hemodynamic instabililty. Patient was seen bedside on 4C. Patient is a/o x3, systolic BP in the 80s (improving to the 90s with fluid boluses), -110 sinus rhythm, SpO2 99% on NRFM at 15 LPM. Patient reports lower back pain and lower abdominal pain. Patient denies chest pain, dyspnea (although full inspiratory effort is limited by abdominal pain), nausea, vomiting, lightheadedness, dizziness.    Blue team bedside and says that current abdominal exam is her baseline and has not changed since post-op.      24 Hour Events      [] A ten-point review of systems was otherwise negative except as noted above.  [ x ] Due to altered mental status/intubation, subjective information was not attained from the patient. History was obtained, to the extent possible, from review of the chart and collateral sources of information.    Daily     Daily     Diet, NPO with Tube Feed:   Tube Feeding Modality: Nasojejunal  Glucerna 1.2 Truong (GLUCERNARTH)  Total Volume for 24 Hours (mL): 480  Continuous  Starting Tube Feed Rate mL per Hour: 10  Increase Tube Feed Rate by (mL): 10     Every 2 hours  Until Goal Tube Feed Rate (mL per Hour): 20  Tube Feed Duration (in Hours): 24  Tube Feed Start Time: 13:10 (05-06-24 @ 14:09)      CURRENT MEDS:  Neurologic Medications  cisatracurium Infusion 3 MICROgram(s)/kG/Min IV Continuous <Continuous>  fentaNYL   Infusion. 0.5 MICROgram(s)/kG/Hr IV Continuous <Continuous>  propofol Infusion 10 MICROgram(s)/kG/Min IV Continuous <Continuous>    Respiratory Medications  albuterol    90 MICROgram(s) HFA Inhaler 2 Puff(s) Inhalation every 6 hours PRN Shortness of Breath and/or Wheezing  albuterol/ipratropium for Nebulization 3 milliLiter(s) Nebulizer every 6 hours  sodium chloride 3%  Inhalation 4 milliLiter(s) Inhalation every 12 hours    Cardiovascular Medications  phenylephrine    Infusion 0.1 MICROgram(s)/kG/Min IV Continuous <Continuous>    Gastrointestinal Medications  pantoprazole  Injectable 40 milliGRAM(s) IV Push every 24 hours    Genitourinary Medications    Hematologic/Oncologic Medications  enoxaparin Injectable 40 milliGRAM(s) SubCutaneous every 12 hours    Antimicrobial/Immunologic Medications  piperacillin/tazobactam IVPB.. 4.5 Gram(s) IV Intermittent every 8 hours    Endocrine/Metabolic Medications    Topical/Other Medications  artificial  tears Solution 1 Drop(s) Both EYES two times a day  chlorhexidine 2% Cloths 1 Application(s) Topical <User Schedule>      ICU Vital Signs Last 24 Hrs  T(C): 36.8 (07 May 2024 04:00), Max: 37.7 (06 May 2024 12:00)  T(F): 98.2 (07 May 2024 04:00), Max: 99.9 (06 May 2024 12:00)  HR: 101 (07 May 2024 06:00) (90 - 112)  BP: 108/71 (07 May 2024 06:00) (78/51 - 149/77)  BP(mean): 85 (07 May 2024 06:00) (60 - 105)  ABP: 96/60 (07 May 2024 06:00) (67/56 - 142/63)  ABP(mean): 74 (07 May 2024 06:00) (45 - 95)  RR: 30 (07 May 2024 06:00) (26 - 30)  SpO2: 94% (07 May 2024 06:00) (84% - 100%)    O2 Parameters below as of 07 May 2024 06:00  Patient On (Oxygen Delivery Method): ventilator  O2 Flow (L/min): 70            ABG - ( 07 May 2024 04:30 )  pH, Arterial: 7.38  pH, Blood: x     /  pCO2: 54    /  pO2: 92    / HCO3: 32    / Base Excess: 5.6   /  SaO2: 98.6                I&O's Summary    06 May 2024 07:01  -  07 May 2024 07:00  --------------------------------------------------------  IN: 1725.3 mL / OUT: 1195 mL / NET: 530.3 mL      I&O's Detail    06 May 2024 07:01  -  07 May 2024 07:00  --------------------------------------------------------  IN:    Cisatracurium: 598.6 mL    FentaNYL: 246.4 mL    Glucerna: 280 mL    IV PiggyBack: 100 mL    Phenylephrine: 185.3 mL    Propofol: 315 mL  Total IN: 1725.3 mL    OUT:    Bulb (mL): 215 mL    Indwelling Catheter - Urethral (mL): 980 mL  Total OUT: 1195 mL    Total NET: 530.3 mL          PHYSICAL EXAM:    General/Neuro    GCS: 3T    = E  4 / V 5  / M 6     Deficits:   On paralytics  Pupils: reactive    Lungs:  On mechanical ventilation (PRVC),  Bilateral breath sounds, Bilateral cracles    Cardiovascular :  Regular rate and rhythm.      GI: Abdomen soft, Non-distended.     Wound: surgical incision clean, dry and intact. CECE Serosanguinous output    Extremities: Extremities warm, pink, well-perfused. Pulses:Rt     Lt    :       Nunez catheter in place, clear urine.      CXR:     LABS:  CAPILLARY BLOOD GLUCOSE      POCT Blood Glucose.: 120 mg/dL (07 May 2024 05:44)  POCT Blood Glucose.: 113 mg/dL (06 May 2024 16:53)  POCT Blood Glucose.: 106 mg/dL (06 May 2024 11:35)                          9.5    14.46 )-----------( 285      ( 06 May 2024 20:48 )             32.0       05-06    139  |  100  |  12  ----------------------------<  119<H>  3.7   |  29  |  0.5<L>    Ca    8.3<L>      06 May 2024 20:48  Phos  3.1     05-06  Mg     1.9     05-06    TPro  5.1<L>  /  Alb  2.8<L>  /  TBili  0.3  /  DBili  0.2  /  AST  23  /  ALT  45<H>  /  AlkPhos  113  05-06            Urinalysis Basic - ( 06 May 2024 20:48 )          Culture - Sputum (collected 06 May 2024 13:40)  Source: ET Tube ET Tube  Gram Stain (07 May 2024 07:51):    Moderate polymorphonuclear leukocytes per low power field    Moderate Squamous epithelial cells per low power field    Few Gram Negative Rods seen per oil power field    Few Gram positive cocci in pairs seen per oil power field

## 2024-05-08 LAB
-  AMOXICILLIN/CLAVULANIC ACID: SIGNIFICANT CHANGE UP
-  AMPICILLIN/SULBACTAM: SIGNIFICANT CHANGE UP
-  AMPICILLIN: SIGNIFICANT CHANGE UP
-  AZTREONAM: SIGNIFICANT CHANGE UP
-  CEFAZOLIN: SIGNIFICANT CHANGE UP
-  CEFEPIME: SIGNIFICANT CHANGE UP
-  CEFOXITIN: SIGNIFICANT CHANGE UP
-  CEFTRIAXONE: SIGNIFICANT CHANGE UP
-  CIPROFLOXACIN: SIGNIFICANT CHANGE UP
-  ERTAPENEM: SIGNIFICANT CHANGE UP
-  GENTAMICIN: SIGNIFICANT CHANGE UP
-  IMIPENEM: SIGNIFICANT CHANGE UP
-  LEVOFLOXACIN: SIGNIFICANT CHANGE UP
-  MEROPENEM: SIGNIFICANT CHANGE UP
-  PIPERACILLIN/TAZOBACTAM: SIGNIFICANT CHANGE UP
-  TOBRAMYCIN: SIGNIFICANT CHANGE UP
-  TRIMETHOPRIM/SULFAMETHOXAZOLE: SIGNIFICANT CHANGE UP
CULTURE RESULTS: ABNORMAL
GAS PNL BLDA: SIGNIFICANT CHANGE UP
GAS PNL BLDA: SIGNIFICANT CHANGE UP
GLUCOSE BLDC GLUCOMTR-MCNC: 101 MG/DL — HIGH (ref 70–99)
GLUCOSE BLDC GLUCOMTR-MCNC: 106 MG/DL — HIGH (ref 70–99)
METHOD TYPE: SIGNIFICANT CHANGE UP
ORGANISM # SPEC MICROSCOPIC CNT: ABNORMAL
ORGANISM # SPEC MICROSCOPIC CNT: SIGNIFICANT CHANGE UP
SPECIMEN SOURCE: SIGNIFICANT CHANGE UP

## 2024-05-08 PROCEDURE — 99291 CRITICAL CARE FIRST HOUR: CPT | Mod: 24,25

## 2024-05-08 PROCEDURE — 71045 X-RAY EXAM CHEST 1 VIEW: CPT | Mod: 26

## 2024-05-08 PROCEDURE — 93010 ELECTROCARDIOGRAM REPORT: CPT

## 2024-05-08 PROCEDURE — 74018 RADEX ABDOMEN 1 VIEW: CPT | Mod: 26

## 2024-05-08 RX ORDER — PIPERACILLIN AND TAZOBACTAM 4; .5 G/20ML; G/20ML
4.5 INJECTION, POWDER, LYOPHILIZED, FOR SOLUTION INTRAVENOUS EVERY 8 HOURS
Refills: 0 | Status: DISCONTINUED | OUTPATIENT
Start: 2024-05-08 | End: 2024-05-13

## 2024-05-08 RX ORDER — ONDANSETRON 8 MG/1
4 TABLET, FILM COATED ORAL EVERY 8 HOURS
Refills: 0 | Status: DISCONTINUED | OUTPATIENT
Start: 2024-05-08 | End: 2024-05-10

## 2024-05-08 RX ORDER — NOREPINEPHRINE BITARTRATE/D5W 8 MG/250ML
0.05 PLASTIC BAG, INJECTION (ML) INTRAVENOUS
Qty: 8 | Refills: 0 | Status: DISCONTINUED | OUTPATIENT
Start: 2024-05-08 | End: 2024-05-10

## 2024-05-08 RX ORDER — ATROPINE SULFATE 0.1 MG/ML
0.5 SYRINGE (ML) INJECTION ONCE
Refills: 0 | Status: DISCONTINUED | OUTPATIENT
Start: 2024-05-08 | End: 2024-05-10

## 2024-05-08 RX ORDER — ATROPINE SULFATE 0.1 MG/ML
0.5 SYRINGE (ML) INJECTION ONCE
Refills: 0 | Status: DISCONTINUED | OUTPATIENT
Start: 2024-05-08 | End: 2024-05-08

## 2024-05-08 RX ORDER — CHLORHEXIDINE GLUCONATE 213 G/1000ML
15 SOLUTION TOPICAL EVERY 12 HOURS
Refills: 0 | Status: DISCONTINUED | OUTPATIENT
Start: 2024-05-08 | End: 2024-05-11

## 2024-05-08 RX ADMIN — PIPERACILLIN AND TAZOBACTAM 25 GRAM(S): 4; .5 INJECTION, POWDER, LYOPHILIZED, FOR SOLUTION INTRAVENOUS at 13:00

## 2024-05-08 RX ADMIN — ALBUTEROL 2 PUFF(S): 90 AEROSOL, METERED ORAL at 21:02

## 2024-05-08 RX ADMIN — Medication 10.5 MICROGRAM(S)/KG/MIN: at 15:54

## 2024-05-08 RX ADMIN — PIPERACILLIN AND TAZOBACTAM 25 GRAM(S): 4; .5 INJECTION, POWDER, LYOPHILIZED, FOR SOLUTION INTRAVENOUS at 00:06

## 2024-05-08 RX ADMIN — ENOXAPARIN SODIUM 40 MILLIGRAM(S): 100 INJECTION SUBCUTANEOUS at 19:20

## 2024-05-08 RX ADMIN — PANTOPRAZOLE SODIUM 40 MILLIGRAM(S): 20 TABLET, DELAYED RELEASE ORAL at 19:20

## 2024-05-08 RX ADMIN — PROPOFOL 6.72 MICROGRAM(S)/KG/MIN: 10 INJECTION, EMULSION INTRAVENOUS at 19:15

## 2024-05-08 RX ADMIN — PIPERACILLIN AND TAZOBACTAM 25 GRAM(S): 4; .5 INJECTION, POWDER, LYOPHILIZED, FOR SOLUTION INTRAVENOUS at 05:24

## 2024-05-08 RX ADMIN — PROPOFOL 6.72 MICROGRAM(S)/KG/MIN: 10 INJECTION, EMULSION INTRAVENOUS at 13:00

## 2024-05-08 RX ADMIN — ENOXAPARIN SODIUM 40 MILLIGRAM(S): 100 INJECTION SUBCUTANEOUS at 05:26

## 2024-05-08 RX ADMIN — CHLORHEXIDINE GLUCONATE 15 MILLILITER(S): 213 SOLUTION TOPICAL at 19:20

## 2024-05-08 RX ADMIN — CHLORHEXIDINE GLUCONATE 1 APPLICATION(S): 213 SOLUTION TOPICAL at 05:25

## 2024-05-08 RX ADMIN — Medication 1 DROP(S): at 18:00

## 2024-05-08 RX ADMIN — Medication 1 DROP(S): at 06:09

## 2024-05-08 RX ADMIN — PROPOFOL 6.72 MICROGRAM(S)/KG/MIN: 10 INJECTION, EMULSION INTRAVENOUS at 15:54

## 2024-05-08 RX ADMIN — FENTANYL CITRATE 5.6 MICROGRAM(S)/KG/HR: 50 INJECTION INTRAVENOUS at 15:54

## 2024-05-08 RX ADMIN — PIPERACILLIN AND TAZOBACTAM 25 GRAM(S): 4; .5 INJECTION, POWDER, LYOPHILIZED, FOR SOLUTION INTRAVENOUS at 21:28

## 2024-05-08 NOTE — PROGRESS NOTE ADULT - ASSESSMENT
ASSESSMENT:  SARAH BERGMAN is a 58y F w/ PMHx of  obesity (BMI 43), GERD, RAFA (CPAP), HTN, ASTHMA admitted for elective gastric bypass and hiatal hernia repair, POD 2, Hosp day 3. Now is POD 0 for RTOR for diagnostic lap with JJ anastomosis revision, JASON, and upper endoscopic release of SBO.    PLAN:  - Monitor respiratory status and vent settings  - Monitor pressor requirements, F/U ABGs  - F/U CECE drain output and appearance  - Monitor for episodes of nausea and vomiting  - Tube feeds held, NPO except meds for now  - Echo: LVEF 70%, grade I diastolic dysfunction, mild to moderate TR, severe pulmonary HTN -> F/U cardiology consult  - Sputum cultures: Klebsiella pneumoniae, gram positive cocci, gram negative rods  - Monitor labs, replete as needed  - Rest of care per SICU team    [phone]

## 2024-05-08 NOTE — PROGRESS NOTE ADULT - SUBJECTIVE AND OBJECTIVE BOX
SARAH BERGMAN   [id number]   65  58yF    ============================   SICU Consult for hemodynamic instability requiring vasopressor support    HPI   59 y/o female BMI 42.4 PMH HTN, RAFA (not on home CPAP/BiPAP) GERD, pseudogout, migraines, renal stones, depression, asthma; PSH bladder repair, cholecystectomy. On 24, patient underwent an elective robotic-assisted hiatal herniorraphy w/ gastric bypass. On , patient was having abdominal pain and vomiting with a concern for possible aspiration. Patient underwent CT abdomen and pelvis showed a narrowed JJ anastomosis. On , patient was taken back to the OR for a diagnostic laparoscopy and found a narrowed jejunojejunal anastomosis, released and revised w/ negative leak test.    This morning, patient was found to by hypotensive and tachycardic on 4C by blue team. SICU consulted for resuscitation and hemodynamic instabililty. Patient was seen bedside on 4C. Patient is a/o x3, systolic BP in the 80s (improving to the 90s with fluid boluses), -110 sinus rhythm, SpO2 99% on NRFM at 15 LPM. Patient reports lower back pain and lower abdominal pain. Patient denies chest pain, dyspnea (although full inspiratory effort is limited by abdominal pain), nausea, vomiting, lightheadedness, dizziness.    Blue team bedside and says that current abdominal exam is her baseline and has not changed since post-op.      24 Hour Events    NIGHT  -Sputum culture results: Numerous Klebsiella pneumoniae and Normal Respiratory Jennifer present prelim, gram stain final with few gpc, few gnr  -pending CK and TG level  -fent at 1, precedex 1.2, nabil. 0.6, and prop 25  -,   -fever 101.3 -->ofirmev x1  -+BM, liquid stool, dignishield replaced    DAY  -Decreased FiO2 from 90 -> 60 ->70  -sputum culture lavage with resp  -CECE drain with serous output, abdomen soft.  - EF 70% hyperdrynamic, severe pulmonary HTN PAH 60.1 -> consult cardio  - Lasix 10 mg   - ABG at noon, if PO2 >95 drop FiO2 to 50  - AB.41/52/78/33  - PRVC 340/30/70/10  - weaned off Nimbex  - vomited --> stopped tube feeds,   - NPO except meds until AM 5/8 then reassess  - Precedex and propofol for sedation, fentanyl for pain      [] A ten-point review of systems was otherwise negative except as noted above.  [ x ] Due to altered mental status/intubation, subjective information was not attained from the patient. History was obtained, to the extent possible, from review of the chart and collateral sources of information.   SARAH BERGMAN   [id number]   65  58yF    ============================   SICU Consult for hemodynamic instability requiring vasopressor support    HPI   59 y/o female BMI 42.4 PMH HTN, RAFA (not on home CPAP/BiPAP) GERD, pseudogout, migraines, renal stones, depression, asthma; PSH bladder repair, cholecystectomy. On 24, patient underwent an elective robotic-assisted hiatal herniorraphy w/ gastric bypass. On , patient was having abdominal pain and vomiting with a concern for possible aspiration. Patient underwent CT abdomen and pelvis showed a narrowed JJ anastomosis. On , patient was taken back to the OR for a diagnostic laparoscopy and found a narrowed jejunojejunal anastomosis, released and revised w/ negative leak test.    This morning, patient was found to by hypotensive and tachycardic on 4C by blue team. SICU consulted for resuscitation and hemodynamic instabililty. Patient was seen bedside on 4C. Patient is a/o x3, systolic BP in the 80s (improving to the 90s with fluid boluses), -110 sinus rhythm, SpO2 99% on NRFM at 15 LPM. Patient reports lower back pain and lower abdominal pain. Patient denies chest pain, dyspnea (although full inspiratory effort is limited by abdominal pain), nausea, vomiting, lightheadedness, dizziness.    Blue team bedside and says that current abdominal exam is her baseline and has not changed since post-op.      24 Hour Events    NIGHT  -Sputum culture results: Numerous Klebsiella pneumoniae and Normal Respiratory Jennifer present prelim, gram stain final with few gpc, few gnr  -pending CK and TG level  -fent at 1, precedex 1.2, nabil. 0.6, and prop 25  -,   -fever 101.3 -->ofirmev x1  -+BM, liquid stool, dignishield replaced    DAY  -Decreased FiO2 from 90 -> 60 ->70  -sputum culture lavage with resp  -CECE drain with serous output, abdomen soft.  - EF 70% hyperdrynamic, severe pulmonary HTN PAH 60.1 -> consult cardio  - Lasix 10 mg   - ABG at noon, if PO2 >95 drop FiO2 to 50  - AB.41/52/78/33  - PRVC 340/30/70/10  - weaned off Nimbex  - vomited --> stopped tube feeds,   - NPO except meds until AM 5/8 then reassess  - Precedex and propofol for sedation, fentanyl for pain      [] A ten-point review of systems was otherwise negative except as noted above.  [ x ] Due to altered mental status/intubation, subjective information was not attained from the patient. History was obtained, to the extent possible, from review of the chart and collateral sources of information.      Daily     Daily     Diet, NPO:   Except Medications (24 @ 19:15)      CURRENT MEDS:  Neurologic Medications  dexMEDEtomidine Infusion 0.2 MICROgram(s)/kG/Hr IV Continuous <Continuous>  fentaNYL   Infusion 0.5 MICROgram(s)/kG/Hr IV Continuous <Continuous>  propofol Infusion 10 MICROgram(s)/kG/Min IV Continuous <Continuous>    Respiratory Medications  albuterol    90 MICROgram(s) HFA Inhaler 2 Puff(s) Inhalation every 6 hours PRN Shortness of Breath and/or Wheezing  sodium chloride 3%  Inhalation 4 milliLiter(s) Inhalation every 12 hours    Cardiovascular Medications  phenylephrine    Infusion 0.1 MICROgram(s)/kG/Min IV Continuous <Continuous>    Gastrointestinal Medications  pantoprazole  Injectable 40 milliGRAM(s) IV Push every 24 hours    Genitourinary Medications    Hematologic/Oncologic Medications  enoxaparin Injectable 40 milliGRAM(s) SubCutaneous every 12 hours    Antimicrobial/Immunologic Medications    Endocrine/Metabolic Medications    Topical/Other Medications  artificial  tears Solution 1 Drop(s) Both EYES two times a day  chlorhexidine 0.12% Liquid 15 milliLiter(s) Oral Mucosa every 12 hours  chlorhexidine 2% Cloths 1 Application(s) Topical <User Schedule>      ICU Vital Signs Last 24 Hrs  T(C): 37.3 (08 May 2024 07:00), Max: 38.5 (07 May 2024 20:00)  T(F): 99.1 (08 May 2024 07:00), Max: 101.3 (07 May 2024 20:00)  HR: 63 (08 May 2024 08:00) (62 - 116)  BP: 96/64 (08 May 2024 06:00) (88/51 - 171/122)  BP(mean): 78 (08 May 2024 06:00) (64 - 142)  ABP: 107/53 (08 May 2024 06:00) (77/66 - 207/91)  ABP(mean): 75 (08 May 2024 06:00) (56 - 136)  RR: 30 (08 May 2024 06:00) (28 - 41)  SpO2: 99% (08 May 2024 06:00) (71% - 100%)    O2 Parameters below as of 08 May 2024 06:00  Patient On (Oxygen Delivery Method): ventilator  O2 Flow (L/min): 60    Mode: AC/ CMV (Assist Control/ Continuous Mandatory Ventilation)  RR (machine): 30  TV (machine): 340  FiO2: 70  PEEP: 10  ITime: 0.9  MAP: 18  PIP: 29    ABG - ( 08 May 2024 05:18 )  pH, Arterial: 7.41  pH, Blood: x     /  pCO2: 50    /  pO2: 118   / HCO3: 32    / Base Excess: 6.1   /  SaO2: 99.2        I&O's Summary    07 May 2024 07:01  -  08 May 2024 07:00  --------------------------------------------------------  IN: 2255.9 mL / OUT: 1830 mL / NET: 425.9 mL      I&O's Detail    07 May 2024 07:01  -  08 May 2024 07:00  --------------------------------------------------------  IN:    Cisatracurium: 188.7 mL    Dexmedetomidine: 448 mL    FentaNYL: 11.2 mL    FentaNYL: 268.8 mL    Glucerna: 100 mL    IV PiggyBack: 300 mL    Phenylephrine: 596.4 mL    Propofol: 107.6 mL    Propofol: 235.2 mL  Total IN: 2255.9 mL    OUT:    Bulb (mL): 70 mL    Indwelling Catheter - Urethral (mL): 1760 mL    Rectal Tube (mL): 0 mL  Total OUT: 1830 mL    Total NET: 425.9 mL          PHYSICAL EXAM:    General/Neuro  Deficits:                             3T  Pupils:reactive equal round   Lungs:    On mechanical ventilation (PRVC),  Bilateral breath sounds, Bilateral cracles  Cardiovascular : S1, S2.  Regular rate and rhythm.  Peripheral edema   Cardiac Rhythm: Normal Sinus Rhythm  GI: Abdomen soft, Non-distended.     Nasogastric tube in place. Surgical sites clean dry and intact, CECE drain serosanguinous.  Extremities: Extremities warm, pink, well-perfused. Pulses:  Derm: Good skin turgor, no skin breakdown.    :       Nunez catheter in place.      LABS:  CAPILLARY BLOOD GLUCOSE      POCT Blood Glucose.: 137 mg/dL (07 May 2024 11:56)                          10.5   13.71 )-----------( 372      ( 07 May 2024 20:38 )             35.3       05-07    141  |  101  |  13  ----------------------------<  128<H>  4.5   |  30  |  0.7    Ca    8.3<L>      07 May 2024 20:38  Phos  3.0     05-07  Mg     2.0     05-07    TPro  5.6<L>  /  Alb  2.8<L>  /  TBili  0.3  /  DBili  0.2  /  AST  26  /  ALT  37  /  AlkPhos  111  05-07        CARDIAC MARKERS ( 07 May 2024 20:38 )  x     / x     / 672 U/L / x     / x          Urinalysis Basic - ( 07 May 2024 20:38 )    Color: x / Appearance: x / SG: x / pH: x  Gluc: 128 mg/dL / Ketone: x  / Bili: x / Urobili: x   Blood: x / Protein: x / Nitrite: x   Leuk Esterase: x / RBC: x / WBC x   Sq Epi: x / Non Sq Epi: x / Bacteria: x        Culture - Sputum (collected 07 May 2024 12:25)  Source: ET Tube ET Tube  Gram Stain (07 May 2024 23:14):    No polymorphonuclear leukocytes per low power field    No Squamous epithelial cells per low power field    Rare Gram positive cocci in pairs per oil power field    Culture - Sputum (collected 06 May 2024 13:40)  Source: ET Tube ET Tube  Gram Stain (07 May 2024 07:51):    Moderate polymorphonuclear leukocytes per low power field    Moderate Squamous epithelial cells per low power field    Few Gram Negative Rods seen per oil power field    Few Gram positive cocci in pairs seen per oil power field  Preliminary Report (07 May 2024 20:11):    Numerous Klebsiella pneumoniae    Normal Respiratory Jennifer present

## 2024-05-08 NOTE — PROGRESS NOTE ADULT - SUBJECTIVE AND OBJECTIVE BOX
The patient was seen and examined   No acute events over the last 24 hours   The patient is s/p Robot-assisted hiatal herniorrhaphy using da Madhuri Xi Creation, bypass, gastric, laparoscopic, robot-assisted Revision of anastomosis of small intestine, Laparoscopic lysis of abdominal adhesions and Release of small bowel obstruction   The patient is currently sedated intubated and on respiratory support       MEDICATIONS  (STANDING):  artificial  tears Solution 1 Drop(s) Both EYES two times a day  chlorhexidine 2% Cloths 1 Application(s) Topical <User Schedule>  dexMEDEtomidine Infusion 0.2 MICROgram(s)/kG/Hr (5.6 mL/Hr) IV Continuous <Continuous>  enoxaparin Injectable 40 milliGRAM(s) SubCutaneous every 12 hours  fentaNYL   Infusion 0.5 MICROgram(s)/kG/Hr (5.6 mL/Hr) IV Continuous <Continuous>  fentaNYL   Infusion. 0.5 MICROgram(s)/kG/Hr (5.6 mL/Hr) IV Continuous <Continuous>  pantoprazole  Injectable 40 milliGRAM(s) IV Push every 24 hours  phenylephrine    Infusion 0.1 MICROgram(s)/kG/Min (4.2 mL/Hr) IV Continuous <Continuous>  propofol Infusion 10 MICROgram(s)/kG/Min (6.72 mL/Hr) IV Continuous <Continuous>  sodium chloride 3%  Inhalation 4 milliLiter(s) Inhalation every 12 hours    MEDICATIONS  (PRN):  albuterol    90 MICROgram(s) HFA Inhaler 2 Puff(s) Inhalation every 6 hours PRN Shortness of Breath and/or Wheezing            Vital Signs Last 24 Hrs  T(C): 37.5 (08 May 2024 04:00), Max: 38.5 (07 May 2024 20:00)  T(F): 99.5 (08 May 2024 04:00), Max: 101.3 (07 May 2024 20:00)  HR: 64 (08 May 2024 06:00) (62 - 116)  BP: 96/64 (08 May 2024 06:00) (88/51 - 171/122)  BP(mean): 78 (08 May 2024 06:00) (64 - 142)  RR: 30 (08 May 2024 06:00) (28 - 41)  SpO2: 99% (08 May 2024 06:00) (71% - 100%)    Parameters below as of 08 May 2024 06:00  Patient On (Oxygen Delivery Method): ventilator  O2 Flow (L/min): 60       REVIEW OF SYSTEMS: Unable to obtain       PHYSICAL EXAM:  · CONSTITUTIONAL: Intubated and sedated   · RESPIRATORY:   airway patent; breath sounds equal; good air movement; respirations non-labored; scattered crackles bilaterally   · CARDIOVASCULAR: regular rate and rhythm + EDD   · EXTREMITIES: No cyanosis, clubbing or edema  · VASCULAR: No carotid bruits   	  TELEMETRY: Sinus rhythm     ECG: Sinus     TTE: Normal LVF     LABS:                        10.5   13.71 )-----------( 372      ( 07 May 2024 20:38 )             35.3     05-07    141  |  101  |  13  ----------------------------<  128<H>  4.5   |  30  |  0.7    Ca    8.3<L>      07 May 2024 20:38  Phos  3.0     05-07  Mg     2.0     05-07    TPro  5.6<L>  /  Alb  2.8<L>  /  TBili  0.3  /  DBili  0.2  /  AST  26  /  ALT  37  /  AlkPhos  111  05-07    CARDIAC MARKERS ( 07 May 2024 20:38 )  x     / x     / 672 U/L / x     / x              I&O's Summary    06 May 2024 07:01  -  07 May 2024 07:00  --------------------------------------------------------  IN: 1803.5 mL / OUT: 1195 mL / NET: 608.5 mL    07 May 2024 07:01  -  08 May 2024 06:58  --------------------------------------------------------  IN: 2255.9 mL / OUT: 1830 mL / NET: 425.9 mL      BNP  RADIOLOGY & ADDITIONAL STUDIES:    IMPRESSION AND PLAN:

## 2024-05-08 NOTE — PROGRESS NOTE ADULT - SUBJECTIVE AND OBJECTIVE BOX
GENERAL SURGERY PROGRESS NOTE     SARAH BERGMAN  58y  Female  Hospital day :10d  POD:9d  Procedure: Robot-assisted hiatal herniorrhaphy using da Madhuri Xi    Creation, bypass, gastric, laparoscopic, robot-assisted    Diagnostic laparoscopy    Revision of anastomosis of small intestine    Laparoscopic lysis of abdominal adhesions    Upper endoscopy    Release of small bowel obstruction    Midline catheter insertion    Arterial cannulation, percutaneous, for monitoring    Ultrasound guided venous access      OVERNIGHT EVENTS:  - Intubated with vent settings 70/10  - On nabil @ 0.6, fentanyl, Precedex and propofol  - Fever 101.3 F -> IV Tylenol given  - One episode of vomiting -> tube feeds held, NPO except meds  - Having liquid bowel movements    T(F): 100.9 (05-07-24 @ 23:00), Max: 101.3 (05-07-24 @ 20:00)  HR: 72 (05-07-24 @ 23:00) (72 - 116)  BP: 171/122 (05-07-24 @ 22:00) (88/51 - 171/122)  ABP: 161/78 (05-07-24 @ 23:00) (73/53 - 207/91)  ABP(mean): 110 (05-07-24 @ 23:00) (56 - 136)  RR: 32 (05-07-24 @ 23:00) (28 - 41)  SpO2: 100% (05-07-24 @ 23:00) (71% - 100%)                                                                        DRAINS:   05-06-24 @ 07:01  -  05-07-24 @ 07:00  --------------------------------------------------------  OUT: 215 mL    URINE:   05-06-24 @ 07:01  -  05-07-24 @ 07:00  --------------------------------------------------------  OUT: 980 mL     Indwelling Urethral Catheter:     Connect To:  Straight Drainage/Gravity    Indication:  Urine Output Monitoring in Critically Ill (05-07-24 @ 03:29)    GI proph:  pantoprazole  Injectable 40 milliGRAM(s) IV Push every 24 hours    AC/ proph: enoxaparin Injectable 40 milliGRAM(s) SubCutaneous every 12 hours    ABx: piperacillin/tazobactam IVPB.. 4.5 Gram(s) IV Intermittent every 8 hours      PHYSICAL EXAM:  GENERAL: NAD  CHEST/LUNG: Intubated  HEART: Regular rate and rhythm, on nabil  ABDOMEN: Soft, Nontender, Nondistended;       LABS  Labs:  CAPILLARY BLOOD GLUCOSE  POCT Blood Glucose.: 137 mg/dL (07 May 2024 11:56)  POCT Blood Glucose.: 120 mg/dL (07 May 2024 05:44)                          10.5   13.71 )-----------( 372      ( 07 May 2024 20:38 )             35.3       Auto Neutrophil %: 69.5 % (05-07-24 @ 20:38)  Auto Immature Granulocyte %: 5.1 % (05-07-24 @ 20:38)    05-07    141  |  101  |  13  ----------------------------<  128<H>  4.5   |  30  |  0.7      Calcium: 8.3 mg/dL (05-07-24 @ 20:38)      LFTs:             5.6  | 0.3  | 26       ------------------[111     ( 07 May 2024 20:38 )  2.8  | 0.2  | 37          Lipase:x      Amylase:x         Blood Gas Arterial, Lactate: 1.1 mmol/L (05-07-24 @ 23:21)  Blood Gas Arterial, Lactate: 1.2 mmol/L (05-07-24 @ 13:49)  Blood Gas Arterial, Lactate: 0.8 mmol/L (05-07-24 @ 09:29)  Blood Gas Arterial, Lactate: 0.9 mmol/L (05-07-24 @ 04:30)  Blood Gas Arterial, Lactate: 1.0 mmol/L (05-06-24 @ 20:14)  Blood Gas Arterial, Lactate: 1.2 mmol/L (05-06-24 @ 15:48)  Blood Gas Arterial, Lactate: 1.1 mmol/L (05-06-24 @ 10:36)  Blood Gas Arterial, Lactate: 1.0 mmol/L (05-06-24 @ 09:24)  Blood Gas Arterial, Lactate: 1.3 mmol/L (05-06-24 @ 04:30)  Blood Gas Arterial, Lactate: 1.7 mmol/L (05-06-24 @ 01:46)  Blood Gas Arterial, Lactate: 1.7 mmol/L (05-05-24 @ 17:26)  Blood Gas Arterial, Lactate: 1.6 mmol/L (05-05-24 @ 13:33)  Blood Gas Arterial, Lactate: 1.7 mmol/L (05-05-24 @ 07:50)    ABG - ( 07 May 2024 23:21 )  pH: 7.39  /  pCO2: 51    /  pO2: 155   / HCO3: 31    / Base Excess: 5.0   /  SaO2: 99.6            ABG - ( 07 May 2024 13:49 )  pH: 7.41  /  pCO2: 52    /  pO2: 78    / HCO3: 33    / Base Excess: 6.9   /  SaO2: 97.0            ABG - ( 07 May 2024 09:29 )  pH: 7.39  /  pCO2: 49    /  pO2: 91    / HCO3: 30    / Base Excess: 4.0   /  SaO2: 98.7              Coags:    CARDIAC MARKERS ( 07 May 2024 20:38 )  x     / x     / 672 U/L / x     / x              Urinalysis Basic - ( 07 May 2024 20:38 )    Color: x / Appearance: x / SG: x / pH: x  Gluc: 128 mg/dL / Ketone: x  / Bili: x / Urobili: x   Blood: x / Protein: x / Nitrite: x   Leuk Esterase: x / RBC: x / WBC x   Sq Epi: x / Non Sq Epi: x / Bacteria: x        Culture - Sputum (collected 07 May 2024 12:25)  Source: ET Tube ET Tube  Gram Stain (07 May 2024 23:14):    No polymorphonuclear leukocytes per low power field    No Squamous epithelial cells per low power field    Rare Gram positive cocci in pairs per oil power field    Culture - Sputum (collected 06 May 2024 13:40)  Source: ET Tube ET Tube  Gram Stain (07 May 2024 07:51):    Moderate polymorphonuclear leukocytes per low power field    Moderate Squamous epithelial cells per low power field    Few Gram Negative Rods seen per oil power field    Few Gram positive cocci in pairs seen per oil power field  Preliminary Report (07 May 2024 20:11):    Numerous Klebsiella pneumoniae    Normal Respiratory Jennifer present          RADIOLOGY & ADDITIONAL TESTS:  < from: Xray Chest 1 View- PORTABLE-Routine (Xray Chest 1 View- PORTABLE-Routine in AM.) (05.07.24 @ 04:20) >  Findings/  impression:  Support devices: ET tube tip 3.5 cm above sivan. Nasogastric tube passes   into stomach. EKG leads overlie thorax, obscuring anatomy..  Cardiac/ Mediastinum: Stable  Lungs/ Pleura: Stable bilateral diffuse airspaceopacities with left   effusion. No pneumothorax  Skeletal/ soft tissues: Stable

## 2024-05-08 NOTE — PROGRESS NOTE ADULT - ASSESSMENT
58y F w/ PMHx of  obesity (BMI 43), GERD, RAFA (CPAP), HTN, ASTHMA admitted for elective gastric bypass and hiatal hernia repair, POD 2, Hosp day 3. Now is POD 0 for RTOR for diagnostic lap with JJ anastomosis revision, JASON, and upper endoscopic release of SBO.      Continue current care as per SICU/Surgery teams   DVT/GI prophylaxis   Continue pressors as needed   Keep negative balance   ID evaluation   Will F/U PRN

## 2024-05-08 NOTE — PHYSICAL THERAPY INITIAL EVALUATION ADULT - THERAPY FREQUENCY, PT EVAL
none. pt is inappropriate for PT. PT removed from program. SICU team notified. Please reconsult PT if further needs arise. none. pt is inappropriate for PT and is removed from program. SICU team notified. Please reconsult PT if further needs arise.

## 2024-05-08 NOTE — PHYSICAL THERAPY INITIAL EVALUATION ADULT - SPECIFY REASON(S)
case discussed with RN and chart reviewed.  becomes severely bradycardic and hypotensive when moved or positioned. She is medically unstable, on pressors, and not appropriate for PT. case discussed with RN and chart reviewed.  becomes severely bradycardic and hypotensive when moved or positioned. She is medically unstable, on pressors, and not appropriate for PT. see below

## 2024-05-08 NOTE — PHARMACOTHERAPY INTERVENTION NOTE - COMMENTS
Patient BMI >40, recommend to increase zosyn to 4.5 g every 8 hours 
Patient currently on norepinephrine, recommend to d/c order of enalaprilat or place hold parameters 
Called [id number] and verified the following medication list:   ·	Famotidine 40mg daily   ·	Loratadine 10mg daily  ·	Losartan 50mg daily  ·	Montelukast 10mg daily   ·	Pantoprazole 20mg daily  ·	Spironolactone 50mg daily   ·	Bupropion XL 150mg daily (8/2023)  ·	Duloxetine DR 60mg BID (8/2023)  ·	Breo 200 (5/2023) 
Patient BMI >40, recommend to increase heparin to 7500 mg IV every 8 hours
Patient NPO except medications, recommend to switch IV pantoprazole to oral 
Recommend obtaining procalcitonin as patient already received a week course of pip/tazo, still elevated WBC, and pending cultures

## 2024-05-08 NOTE — CHART NOTE - NSCHARTNOTEFT_GEN_A_CORE
GI NUTRITION SUPPORT TEAM  -  CONSULT NOTE     HPI: 59 y/o female BMI 42.4 PMH HTN, RAFA (not on home CPAP/BiPAP) GERD, pseudogout, migraines, renal stones, depression, asthma; PSH bladder repair, cholecystectomy. On 4/29/24, patient underwent an elective robotic-assisted hiatal herniorraphy w/ gastric bypass. On 4/30, patient was having abdominal pain and vomiting with a concern for possible aspiration. Patient underwent CT abdomen and pelvis showed a narrowed JJ anastomosis. On 4/30 PM, patient was taken back to the OR for a diagnostic laparoscopy and found a narrowed jejunojejunal anastomosis, released and revised w/ negative leak test. On intubation for 2nd surgery pt with large volume emesis. In the AM 5/1 pt hypotensive, tachycardic and increased O2 requirement with c/o lower abdominal and back pain. Upgraded to SICU and tx for aspiration pneumonia HFNC/BIPAP and required pressors. Respiratory status worsened and progressed to ARDS requiring intubation 5/5.      GI NUTRITION SUPPORT NOTE:    NPO since admission day#10. Briefly fed with sips of clear liquids after initial surgery and started TF's 5/6 with Glucerna 1.2, received a total of 400ml Glucerna 1.2 (475 kcals, 22g protein). Feeds stopped yesterday after episode of emesis when nimbex was d/c'd. Today remains NPO with KUB showing distended loops of bowel, ileus suspected.       REVIEW OF SYSTEMS:  Negative except as noted above.       PAST MEDICAL/SURGICAL HISTORY:   Asthma  Depression  GERD (gastroesophageal reflux disease)  Neck pain  Back pain  HTN (hypertension)  RAFA (obstructive sleep apnea)  Pseudogout  Renal stones  Migraines  Severe obesity (BMI >= 40)  History of cholecystectomy  H/O bladder repair surgery  Renal stones  H/O removal of cyst      ALLERGIES:  contrast media (gadolinium-based) (Rash)    VITALS:  T(F): 99.1 (05-08 @ 07:00), Max: 99.5 (05-08 @ 04:00)  HR: 59 (05-08 @ 12:00) (58 - 98)  BP: 124/58 (05-08 @ 12:00) (96/64 - 139/62)  RR: 5 (05-08 @ 12:00) (1 - 38)  SpO2: 98% (05-08 @ 12:00) (92% - 100%)    HEIGHT/WEIGHT/BMI:   Height (cm): 162.6 (04-30), 162.6 (04-15)  Weight (kg): 112 (04-30), 117.5 (04-15)  BMI (kg/m2): 42.4 (04-30), 44.4 (04-15)    PHYSICAL EXAM:   GENERAL: Intubated, sedated  ABDOMEN: Soft, Nontender, Nondistended; LLQ CECE drain with serosanguinous output  EXTREMITIES:  No clubbing, cyanosis, or edema  SKIN: warm and well perfused; No obvious rashes or lesions  IV ACCESS: peripheral Left AC  ENTERAL ACCESS: NG/J [place]  Rectal tube: no output    I/Os:     05-07-24 @ 07:01  -  05-08-24 @ 07:00  --------------------------------------------------------  IN:    Cisatracurium: 188.7 mL    Dexmedetomidine: 448 mL    FentaNYL: 11.2 mL    FentaNYL: 268.8 mL    Glucerna: 100 mL    IV PiggyBack: 300 mL    Phenylephrine: 596.4 mL    Propofol: 107.6 mL    Propofol: 235.2 mL  Total IN: 2255.9 mL    OUT:    Bulb (mL): 70 mL    Indwelling Catheter - Urethral (mL): 1800 mL    Rectal Tube (mL): 0 mL  Total OUT: 1870 mL    Total NET: 385.9 mL      STANDING MEDICATIONS:   albuterol    90 MICROgram(s) HFA Inhaler 2 Puff(s) Inhalation every 6 hours PRN  artificial  tears Solution 1 Drop(s) Both EYES two times a day  atropine Injectable 0.5 milliGRAM(s) IntraMuscular once  chlorhexidine 0.12% Liquid 15 milliLiter(s) Oral Mucosa every 12 hours  chlorhexidine 2% Cloths 1 Application(s) Topical <User Schedule>  dexMEDEtomidine Infusion 0.2 MICROgram(s)/kG/Hr IV Continuous <Continuous>  enoxaparin Injectable 40 milliGRAM(s) SubCutaneous every 12 hours  fentaNYL   Infusion 0.5 MICROgram(s)/kG/Hr IV Continuous <Continuous>  norepinephrine Infusion 0.05 MICROgram(s)/kG/Min IV Continuous <Continuous>  pantoprazole  Injectable 40 milliGRAM(s) IV Push every 24 hours  piperacillin/tazobactam IVPB.. 4.5 Gram(s) IV Intermittent every 8 hours  propofol Infusion 10 MICROgram(s)/kG/Min IV Continuous <Continuous>  sodium chloride 3%  Inhalation 4 milliLiter(s) Inhalation every 12 hours      LABS:                         10.5   13.71 )-----------( 372      ( 07 May 2024 20:38 )             35.3     141  |  101  |  13  ----------------------------<  128<H>          (05-07-24 @ 20:38)  4.5   |  30  |  0.7    Ca    8.3<L>          (05-07-24 @ 20:38)  Phos  3.0         (05-07-24 @ 20:38)  Mg     2.0         (05-07-24 @ 20:38)    TPro  5.6<L>  /  Alb  2.8<L>  /  TBili  0.3  /  DBili  0.2  /  AST  26  /  ALT  37  /  AlkPhos  111       05-07-24 @ 20:38    Triglycerides, Serum: 149 mg/dL (05-07 @ 20:38)    A1c: 5.9 % (04-15-24 @ 11:13)    Blood Glucose (Past 24 hours):  101 mg/dL (05-08 @ 12:27)      DIET:   Diet, NPO:   Except Medications (05-07-24 @ 19:15) [Active]      RADIOLOGY:  < from: Xray Kidney Ureter Bladder (05.08.24 @ 10:05) >    ACC: [id number] EXAM:  XR KUB 1 VIEW   ORDERED BY: VICTORINO CROW     PROCEDURE DATE:  05/08/2024      INTERPRETATION:  Clinical History / Reason for exam: Catheter evaluation.    Frontal view of the abdomen. Comparison is made with a study done 2 days   prior.    Findings/  impression:    Enteric catheter extends below the hemidiaphragm.    Distended loops of bowel are seen.    Degenerative changes of the spine    --- End of Report ---  < end of copied text >      ASSESSMENT  59 y/o female BMI 42.4 PMH HTN, RAFA (not on home CPAP/BiPAP) GERD, pseudogout, migraines, renal stones, depression, asthma; PSH bladder repair, cholecystectomy. On 4/29/24, patient underwent an elective robotic-assisted hiatal herniorraphy w/ gastric bypass. On 4/30, patient was having abdominal pain and vomiting with a concern for possible aspiration. Patient underwent CT abdomen and pelvis showed a narrowed JJ anastomosis. On 4/30 PM, patient was taken back to the OR for a diagnostic laparoscopy and found a narrowed jejunojejunal anastomosis, released and revised w/ negative leak test. On intubation for 2nd surgery pt with large volume emesis. In the AM 5/1 pt hypotensive, tachycardic and increased O2 requirement with c/o lower abdominal and back pain. Upgraded to SICU and tx for aspiration pneumonia HFNC/BIPAP and required pressors. Respiratory status worsened and progressed to ARDS requiring intubation 5/5    - s/p robotic-assisted hiatal herniorraphy w/ gastric bypass (4/29), diagnostic laparoscopy with released and revised JJ anastomosis narrowing w/ negative leak test (4/30).  - aspiration pneumonia with hypoxic respiratory failure  - class III obesity, BMI 42.4  - at risk for malnutrition (prolonged NPO)  - risk for refeeding syndrome    Est nutrient needs: 2942-9620 kcals (11-14kcals/kg ABW), 136g protein (2.5g/kg IBW)  *ASPEN/SCCM guidelines for obesity       PLAN   - IF plan on starting enteral feeds goal feeds are Vital HP at 45ml/hr with SF Prosouce TF X 4/d (1226kcals + 377 from propofol at current dose=1603 kcals, 137g protein)  - suggest starting 100mg thiamine, 1mg folic acid and MV with minerals daily. If can not give orally give IV (IV daily dose of multivitamin is 10ml and MTE (multitrace elements) 1mg)  - if unable to feed enterally consider parenteral nutrition  - will follow

## 2024-05-08 NOTE — PROGRESS NOTE ADULT - ASSESSMENT
Assessment and Plan:	  58y Female s/p robotic-assisted hiatal herniorraphy w/ gastric bypass (4/29), diagnostic laparoscopy with released and revised JJ anastomosis narrowing w/ negative leak test (4/30).    NEUROLOGICAL:  #Sedation  - propofol and precedex  #paralytic  - nimbex  -> weaned off 5/7 PM  #Acute pain  - fentanyl drip  #Depression  - HOLDING home duloxetine  - HOLDING home wellbutrin  - HOLDING home amitriptyline    RESPIRATORY:   #Mechanical ventilation secondary to aspiration pneumonia with hypoxic respiratory failure    -intubated 5/5, LL 23, ET 7.5  - PRVC 340/30/70/10   Duonebs  - albuterol inhaler    CARDIOVASCULAR:   #Acute hypotension secondary to sedation requirement    -phenylephrine <1mcg/kg/min  #HTN  - HOLDING home antihypertensives (losartan, spironolactone)  - Elevated troponemia --> downtrended, no longer trending   cardiology recs appreciated  ECHO (5/6): EF 70%/G1DD/TR/severe pulmonary HTN    GASTROINTESTINAL/NUTRITION:   #s/p robotic-assisted hiatal herniorraphy w/ gastric bypass (4/29),   # s/p diagnostic laparoscopy with released and revised JJ anastomosis narrowing w/ negative leak test (4/30)    -Diet NPO x/rx    - LLQ CECE drain with serosanguinous output     -NG tube placement by Dr. Montoya 65cm    - aspiration precautions    - head of bed 30 degrees  #GI Prophylaxis  - protonix IV- if change to PO, will need protonix granules  #Bowel regimen    -Last BM 5/8    /RENAL:   #urine output in critically ill    -bowden placed 5/5  #intermittent diuresis    - last dose lasix 20mg IV 5/7  Monitor UO-bowden in place  Labs:          BUN/Cr- 12/0.5  -->,  13/0.7  -->          Electrolytes-(05-07 @ 20:38)Na 141 // K 4.5 // Mg 2.0 // Phos 3.0   #KIKA - resolved  - non-oliguric    HEME/ONC:   #DVT prophylaxis  - Lovenox 40 q12  - SCDs  DVT propylaxis-enoxaparin Injectable  Hb/Hct:  10.1/33.0  -->,  9.5/32.0  -->,  10.5/35.3  -->  Plts:  284  -->,  285  -->,  372  -->    PTT/INR:   T&S Expires: 5/7    ID:  WBC- 7.56  --->>,  14.46  --->>,  13.71  --->>  Temp trend- 24hrs T(F): 100.9 (05-07 @ 23:00), Max: 101.3 (05-07 @ 20:00)  Antibiotics-piperacillin/tazobactam IVPB.. 4.5 every 8 hours  Cultures:  Source: ET Tube ET Tube  Preliminary Report:    Numerous Klebsiella pneumoniae    Normal Respiratory Jennifer present  Antibiotics    - piperacillin/tazobactam IVPB.. 3.375 every 8 hours (5/2 to end 5/8) for severe aspiration pneumonitis  Cultures:    - MRSA negative    - sputum culture Numerous Klebsiella pneumoniae and Normal Respiratory Jennifer present prelim, gram stain final with few gpc, few gnr      ENDOCRINOLOGY:  - POCT fingersticks q6 hours while NPO  - Glucose goal 140-180    MSK:  Activity - Bedrest (05-05-24 @ 07:34) [Active]    LINES/DRAINS:  PIVs, bilateral cephalic midlines (5/1), LLQ drain (5/1), R radial arterial line (5/5-), bowden (5/5)     ADVANCED DIRECTIVES:  Full Code    HCP/Emergency Contact-    INDICATION FOR SICU: hemodynamic instability requiring vasopressor support      DISPO:  SICU Assessment and Plan:	  58y Female s/p robotic-assisted hiatal herniorraphy w/ gastric bypass (), diagnostic laparoscopy with released and revised JJ anastomosis narrowing w/ negative leak test ().    NEUROLOGICAL:  #Sedation  - propofol and precedex  #Acute pain  - fentanyl drip  #Depression  - HOLDING home duloxetine  - HOLDING home wellbutrin  - HOLDING home amitriptyline    RESPIRATORY:   #Mechanical ventilation secondary to aspiration pneumonia with hypoxic respiratory failure    -intubated , LL 23, ET 7.5  - PRVC 340/30/60/10   Duonebs  - albuterol inhaler  - AB.41/50/118/32  lac 1    CARDIOVASCULAR:   #Acute hypotension secondary to sedation requirement    -phenylephrine infusion  #HTN  - HOLDING home antihypertensives (losartan, spironolactone)  - Elevated troponemia --> downtrended, no longer trending   cardiology recs appreciated  ECHO (): EF 70%/G1DD/TR/severe pulmonary HTN    GASTROINTESTINAL/NUTRITION:   #s/p robotic-assisted hiatal herniorraphy w/ gastric bypass (),   # s/p diagnostic laparoscopy with released and revised JJ anastomosis narrowing w/ negative leak test ()    -Diet NPO x/rx    - LLQ CECE drain with serosanguinous output     -NG tube placement by Dr. Montoya 65cm    - aspiration precautions    - head of bed 30 degrees  #GI Prophylaxis  - protonix IV- if change to PO, will need protonix granules  #Bowel regimen    -Last BM     /RENAL:   #urine output in critically ill    -bowden placed   #intermittent diuresis    - last dose lasix 20mg IV     Labs:          BUN/Cr- 12/0.5  -->,  13/0.7  -->          Electrolytes-( @ 20:38)Na 141 // K 4.5 // Mg 2.0 // Phos 3.0     #KIKA - resolved  - non-oliguric    HEME/ONC:   #DVT prophylaxis  - Lovenox 40 q12  - SCDs    Labs: Hb/Hct:  9.5/32.0  -->,  10.5/35.3  -->                      Plts:  285  -->,  372  -->                 PTT/INR:      T&S Expires:     ID:  WBC- 7.56  --->>,  14.46  --->>,  13.71  --->>  Temp trend- 24hrs T(F): 99.5 ( @ 04:00), Max: 101.3 ( @ 20:00)  Antibiotics    - piperacillin/tazobactam IVPB.. 3.375 every 8 hours ( to end ) for severe aspiration pneumonitis  Cultures:    - MRSA negative    - sputum culture Numerous Klebsiella pneumoniae and Normal Respiratory Jennifer present prelim, gram stain final with few gpc, few gnr      ENDOCRINOLOGY:  - POCT fingersticks q6 hours while NPO  - Glucose goal 140-180    MSK:  Activity - Bedrest (24 @ 07:34) [Active]    LINES/DRAINS:  PIVs, bilateral cephalic midlines (), LLQ drain (), R radial arterial line (-), bowden ()     ADVANCED DIRECTIVES:  Full Code    HCP/Emergency Contact-    INDICATION FOR SICU: hemodynamic instability requiring vasopressor support      DISPO:  SICU

## 2024-05-09 LAB
ANION GAP SERPL CALC-SCNC: 10 MMOL/L — SIGNIFICANT CHANGE UP (ref 7–14)
ANION GAP SERPL CALC-SCNC: 14 MMOL/L — SIGNIFICANT CHANGE UP (ref 7–14)
BASOPHILS # BLD AUTO: 0.05 K/UL — SIGNIFICANT CHANGE UP (ref 0–0.2)
BASOPHILS # BLD AUTO: 0.05 K/UL — SIGNIFICANT CHANGE UP (ref 0–0.2)
BASOPHILS NFR BLD AUTO: 0.3 % — SIGNIFICANT CHANGE UP (ref 0–1)
BASOPHILS NFR BLD AUTO: 0.3 % — SIGNIFICANT CHANGE UP (ref 0–1)
BUN SERPL-MCNC: 10 MG/DL — SIGNIFICANT CHANGE UP (ref 10–20)
BUN SERPL-MCNC: 7 MG/DL — LOW (ref 10–20)
CALCIUM SERPL-MCNC: 8.1 MG/DL — LOW (ref 8.4–10.5)
CALCIUM SERPL-MCNC: 8.1 MG/DL — LOW (ref 8.4–10.5)
CHLORIDE SERPL-SCNC: 101 MMOL/L — SIGNIFICANT CHANGE UP (ref 98–110)
CHLORIDE SERPL-SCNC: 102 MMOL/L — SIGNIFICANT CHANGE UP (ref 98–110)
CO2 SERPL-SCNC: 27 MMOL/L — SIGNIFICANT CHANGE UP (ref 17–32)
CO2 SERPL-SCNC: 29 MMOL/L — SIGNIFICANT CHANGE UP (ref 17–32)
CREAT SERPL-MCNC: 0.5 MG/DL — LOW (ref 0.7–1.5)
CREAT SERPL-MCNC: <0.5 MG/DL — LOW (ref 0.7–1.5)
EGFR: 109 ML/MIN/1.73M2 — SIGNIFICANT CHANGE UP
EGFR: 115 ML/MIN/1.73M2 — SIGNIFICANT CHANGE UP
EOSINOPHIL # BLD AUTO: 0.65 K/UL — SIGNIFICANT CHANGE UP (ref 0–0.7)
EOSINOPHIL # BLD AUTO: 0.77 K/UL — HIGH (ref 0–0.7)
EOSINOPHIL NFR BLD AUTO: 4 % — SIGNIFICANT CHANGE UP (ref 0–8)
EOSINOPHIL NFR BLD AUTO: 4.9 % — SIGNIFICANT CHANGE UP (ref 0–8)
GAS PNL BLDA: SIGNIFICANT CHANGE UP
GLUCOSE BLDC GLUCOMTR-MCNC: 116 MG/DL — HIGH (ref 70–99)
GLUCOSE BLDC GLUCOMTR-MCNC: 120 MG/DL — HIGH (ref 70–99)
GLUCOSE BLDC GLUCOMTR-MCNC: 138 MG/DL — HIGH (ref 70–99)
GLUCOSE BLDC GLUCOMTR-MCNC: 145 MG/DL — HIGH (ref 70–99)
GLUCOSE SERPL-MCNC: 105 MG/DL — HIGH (ref 70–99)
GLUCOSE SERPL-MCNC: 127 MG/DL — HIGH (ref 70–99)
HCT VFR BLD CALC: 31.9 % — LOW (ref 37–47)
HCT VFR BLD CALC: 33.8 % — LOW (ref 37–47)
HGB BLD-MCNC: 10 G/DL — LOW (ref 12–16)
HGB BLD-MCNC: 9.7 G/DL — LOW (ref 12–16)
IMM GRANULOCYTES NFR BLD AUTO: 2.3 % — HIGH (ref 0.1–0.3)
IMM GRANULOCYTES NFR BLD AUTO: 4.1 % — HIGH (ref 0.1–0.3)
LYMPHOCYTES # BLD AUTO: 1.92 K/UL — SIGNIFICANT CHANGE UP (ref 1.2–3.4)
LYMPHOCYTES # BLD AUTO: 11.7 % — LOW (ref 20.5–51.1)
LYMPHOCYTES # BLD AUTO: 13.5 % — LOW (ref 20.5–51.1)
LYMPHOCYTES # BLD AUTO: 2.14 K/UL — SIGNIFICANT CHANGE UP (ref 1.2–3.4)
MAGNESIUM SERPL-MCNC: 1.9 MG/DL — SIGNIFICANT CHANGE UP (ref 1.8–2.4)
MAGNESIUM SERPL-MCNC: 1.9 MG/DL — SIGNIFICANT CHANGE UP (ref 1.8–2.4)
MCHC RBC-ENTMCNC: 26.2 PG — LOW (ref 27–31)
MCHC RBC-ENTMCNC: 26.9 PG — LOW (ref 27–31)
MCHC RBC-ENTMCNC: 29.6 G/DL — LOW (ref 32–37)
MCHC RBC-ENTMCNC: 30.4 G/DL — LOW (ref 32–37)
MCV RBC AUTO: 88.4 FL — SIGNIFICANT CHANGE UP (ref 81–99)
MCV RBC AUTO: 88.7 FL — SIGNIFICANT CHANGE UP (ref 81–99)
MONOCYTES # BLD AUTO: 0.87 K/UL — HIGH (ref 0.1–0.6)
MONOCYTES # BLD AUTO: 1.03 K/UL — HIGH (ref 0.1–0.6)
MONOCYTES NFR BLD AUTO: 5.3 % — SIGNIFICANT CHANGE UP (ref 1.7–9.3)
MONOCYTES NFR BLD AUTO: 6.5 % — SIGNIFICANT CHANGE UP (ref 1.7–9.3)
NEUTROPHILS # BLD AUTO: 11.23 K/UL — HIGH (ref 1.4–6.5)
NEUTROPHILS # BLD AUTO: 12.51 K/UL — HIGH (ref 1.4–6.5)
NEUTROPHILS NFR BLD AUTO: 70.7 % — SIGNIFICANT CHANGE UP (ref 42.2–75.2)
NEUTROPHILS NFR BLD AUTO: 76.4 % — HIGH (ref 42.2–75.2)
NRBC # BLD: 0 /100 WBCS — SIGNIFICANT CHANGE UP (ref 0–0)
NRBC # BLD: 0 /100 WBCS — SIGNIFICANT CHANGE UP (ref 0–0)
PHOSPHATE SERPL-MCNC: 2.8 MG/DL — SIGNIFICANT CHANGE UP (ref 2.1–4.9)
PHOSPHATE SERPL-MCNC: 3.2 MG/DL — SIGNIFICANT CHANGE UP (ref 2.1–4.9)
PLATELET # BLD AUTO: 349 K/UL — SIGNIFICANT CHANGE UP (ref 130–400)
PLATELET # BLD AUTO: 359 K/UL — SIGNIFICANT CHANGE UP (ref 130–400)
PMV BLD: 10.3 FL — SIGNIFICANT CHANGE UP (ref 7.4–10.4)
PMV BLD: 9.8 FL — SIGNIFICANT CHANGE UP (ref 7.4–10.4)
POTASSIUM SERPL-MCNC: 4 MMOL/L — SIGNIFICANT CHANGE UP (ref 3.5–5)
POTASSIUM SERPL-MCNC: 4.5 MMOL/L — SIGNIFICANT CHANGE UP (ref 3.5–5)
POTASSIUM SERPL-SCNC: 4 MMOL/L — SIGNIFICANT CHANGE UP (ref 3.5–5)
POTASSIUM SERPL-SCNC: 4.5 MMOL/L — SIGNIFICANT CHANGE UP (ref 3.5–5)
PROCALCITONIN SERPL-MCNC: 0.5 NG/ML — HIGH (ref 0.02–0.1)
RBC # BLD: 3.61 M/UL — LOW (ref 4.2–5.4)
RBC # BLD: 3.81 M/UL — LOW (ref 4.2–5.4)
RBC # FLD: 15.6 % — HIGH (ref 11.5–14.5)
RBC # FLD: 15.6 % — HIGH (ref 11.5–14.5)
SODIUM SERPL-SCNC: 140 MMOL/L — SIGNIFICANT CHANGE UP (ref 135–146)
SODIUM SERPL-SCNC: 143 MMOL/L — SIGNIFICANT CHANGE UP (ref 135–146)
WBC # BLD: 15.87 K/UL — HIGH (ref 4.8–10.8)
WBC # BLD: 16.38 K/UL — HIGH (ref 4.8–10.8)
WBC # FLD AUTO: 15.87 K/UL — HIGH (ref 4.8–10.8)
WBC # FLD AUTO: 16.38 K/UL — HIGH (ref 4.8–10.8)

## 2024-05-09 PROCEDURE — 93970 EXTREMITY STUDY: CPT | Mod: 26

## 2024-05-09 PROCEDURE — 71045 X-RAY EXAM CHEST 1 VIEW: CPT | Mod: 26

## 2024-05-09 PROCEDURE — 99291 CRITICAL CARE FIRST HOUR: CPT | Mod: 24,25

## 2024-05-09 RX ORDER — MAGNESIUM SULFATE 500 MG/ML
1 VIAL (ML) INJECTION ONCE
Refills: 0 | Status: COMPLETED | OUTPATIENT
Start: 2024-05-09 | End: 2024-05-09

## 2024-05-09 RX ORDER — VANCOMYCIN HCL 1 G
1000 VIAL (EA) INTRAVENOUS ONCE
Refills: 0 | Status: COMPLETED | OUTPATIENT
Start: 2024-05-09 | End: 2024-05-09

## 2024-05-09 RX ADMIN — ENOXAPARIN SODIUM 40 MILLIGRAM(S): 100 INJECTION SUBCUTANEOUS at 18:15

## 2024-05-09 RX ADMIN — Medication 250 MILLIGRAM(S): at 11:29

## 2024-05-09 RX ADMIN — PROPOFOL 6.72 MICROGRAM(S)/KG/MIN: 10 INJECTION, EMULSION INTRAVENOUS at 08:17

## 2024-05-09 RX ADMIN — PROPOFOL 6.72 MICROGRAM(S)/KG/MIN: 10 INJECTION, EMULSION INTRAVENOUS at 19:00

## 2024-05-09 RX ADMIN — PIPERACILLIN AND TAZOBACTAM 25 GRAM(S): 4; .5 INJECTION, POWDER, LYOPHILIZED, FOR SOLUTION INTRAVENOUS at 05:11

## 2024-05-09 RX ADMIN — PROPOFOL 6.72 MICROGRAM(S)/KG/MIN: 10 INJECTION, EMULSION INTRAVENOUS at 13:46

## 2024-05-09 RX ADMIN — PANTOPRAZOLE SODIUM 40 MILLIGRAM(S): 20 TABLET, DELAYED RELEASE ORAL at 18:15

## 2024-05-09 RX ADMIN — CHLORHEXIDINE GLUCONATE 15 MILLILITER(S): 213 SOLUTION TOPICAL at 05:28

## 2024-05-09 RX ADMIN — PROPOFOL 6.72 MICROGRAM(S)/KG/MIN: 10 INJECTION, EMULSION INTRAVENOUS at 21:52

## 2024-05-09 RX ADMIN — Medication 1 DROP(S): at 18:24

## 2024-05-09 RX ADMIN — Medication 100 GRAM(S): at 22:35

## 2024-05-09 RX ADMIN — CHLORHEXIDINE GLUCONATE 1 APPLICATION(S): 213 SOLUTION TOPICAL at 05:28

## 2024-05-09 RX ADMIN — Medication 1 DROP(S): at 07:04

## 2024-05-09 RX ADMIN — PIPERACILLIN AND TAZOBACTAM 25 GRAM(S): 4; .5 INJECTION, POWDER, LYOPHILIZED, FOR SOLUTION INTRAVENOUS at 14:13

## 2024-05-09 RX ADMIN — PIPERACILLIN AND TAZOBACTAM 25 GRAM(S): 4; .5 INJECTION, POWDER, LYOPHILIZED, FOR SOLUTION INTRAVENOUS at 21:02

## 2024-05-09 RX ADMIN — Medication 63.75 MILLIMOLE(S): at 11:47

## 2024-05-09 RX ADMIN — FENTANYL CITRATE 5.6 MICROGRAM(S)/KG/HR: 50 INJECTION INTRAVENOUS at 11:29

## 2024-05-09 RX ADMIN — CHLORHEXIDINE GLUCONATE 15 MILLILITER(S): 213 SOLUTION TOPICAL at 18:15

## 2024-05-09 RX ADMIN — ENOXAPARIN SODIUM 40 MILLIGRAM(S): 100 INJECTION SUBCUTANEOUS at 05:28

## 2024-05-09 NOTE — PROGRESS NOTE ADULT - ASSESSMENT
Assessment and Plan:	  58y Female s/p robotic-assisted hiatal herniorraphy w/ gastric bypass (), diagnostic laparoscopy with released and revised JJ anastomosis narrowing w/ negative leak test ().    NEUROLOGICAL:  #Sedation  - propofol  - D/c precedex due to bradycardia to HR 27  - ,   #Acute pain  - fentanyl drip  #Depression  - HOLDING home duloxetine  - HOLDING home wellbutrin  - HOLDING home amitriptyline    RESPIRATORY:   #Mechanical ventilation secondary to aspiration pneumonia with hypoxic respiratory failure  - intubated , LL 23, ET 7.5  - PRVC 340/30/60/10  - Duonebs  - albuterol inhaler  - AB.41/50/118/32  lac 1    CARDIOVASCULAR:   #Acute hypotension secondary to sedation requirement  - phenylephrine infusion currently at 0.5  #HTN  - HOLDING home antihypertensives (losartan, spironolactone)  - Elevated troponemia --> downtrended, no longer trending  - cardiology recs appreciated-->pending cards eval for echo with hyperdynamic HF and severe pulm HTN  ECHO (): EF 70%/G1DD/TR/severe pulmonary HTN    GASTROINTESTINAL/NUTRITION:   #s/p robotic-assisted hiatal herniorraphy w/ gastric bypass (),   # s/p diagnostic laparoscopy with released and revised JJ anastomosis narrowing w/ negative leak test ()  - Diet NPO x/rx  - LLQ CECE drain with serosanguinous output   - NG tube placement by Dr. Montoya 65cm --> NG tube slightly dislodged on  PM while patient turned, approximately 62cm at nare, primary team made aware --> KUB confirming placement?  - aspiration precautions  - head of bed 30 degrees  #GI Prophylaxis  - protonix IV - if change to PO, will need protonix granules  #Bowel regimen  - Last BM     /RENAL:   #urine output in critically ill  - bowden placed   #intermittent diuresis  - last dose lasix 20mg IV   Monitor UO-bowden in place  Labs:          BUN/Cr- 13/0.7  -->,  10/0.5  -->          Electrolytes-( @ 04:00)Na 143 // K 4.0 // Mg 1.9 // Phos 2.8     #KIKA - resolved  - non-oliguric    HEME/ONC:   #DVT prophylaxis  - Lovenox 40 q12  - SCDs  Hb/Hct:  9.5/32.0  -->,  10.5/35.3  -->,  10.0/33.8  -->  Plts:  285  -->,  372  -->,  359  -->    PTT/INR:   T&S:  T&S Expires:     ID:  WBC- 14.46  --->>,  13.71  --->>,  15.87  --->>  Temp trend- 24hrs T(F): 98.6 ( @ 04:00), Max: 99.3 ( @ 00:00)  Antibiotics-piperacillin/tazobactam IVPB.. 4.5 every 8 hours  Antibiotics  - piperacillin/tazobactam IVPB.. 3.375 every 8 hours ( to end ) for severe aspiration pneumonitis, restarted   Cultures:  - MRSA negative  - sputum culture Numerous Klebsiella pneumoniae and Normal Respiratory Jennifer present prelim, gram stain final with few gpc, few gnr  - (): Tracheal aspirate culture: negative    - f/u procalcitonin    ENDOCRINOLOGY:  - POCT fingersticks q6 hours while NPO  - Glucose goal 140-180    MSK:  Activity - Bedrest (24 @ 07:34) [Active]    LINES/DRAINS:  PIVs, bilateral cephalic midlines (), LLQ drain (), R radial arterial line ( - ), bowden ( - )     ADVANCED DIRECTIVES:  Full Code    HCP/Emergency Contact-    INDICATION FOR SICU: hemodynamic instability requiring vasopressor support    DISPO:  SICU

## 2024-05-09 NOTE — PROCEDURE NOTE - NSPROCNAME_GEN_A_CORE
Arterial Puncture/Cannulation
Feeding Tube Placement
Arterial Puncture/Cannulation
Midline Insertion

## 2024-05-09 NOTE — PROCEDURE NOTE - NSPROCDETAILS_GEN_ALL_CORE
location identified, draped/prepped, sterile technique used/sterile dressing applied/sterile technique, catheter placed/supine position/ultrasound guidance
location identified, draped/prepped, sterile technique used, needle inserted/introduced/positive blood return obtained via catheter/connected to a pressurized flush line/sutured in place/all materials/supplies accounted for at end of procedure
location identified, draped/prepped, sterile technique used, needle inserted/introduced

## 2024-05-09 NOTE — PROCEDURE NOTE - NSINDICATIONS_GEN_A_CORE
arterial puncture to obtain ABG's/critical patient/monitoring purposes
critical patient/monitoring purposes
respiratory distress
venous access

## 2024-05-09 NOTE — PROGRESS NOTE ADULT - SUBJECTIVE AND OBJECTIVE BOX
SARAH BERGMAN   [id number]   [birth year]  58yF    ============================   SICU Consult for hemodynamic instability requiring vasopressor support    HPI   57 y/o female BMI 42.4 PMH HTN, RAFA (not on home CPAP/BiPAP) GERD, pseudogout, migraines, renal stones, depression, asthma; PSH bladder repair, cholecystectomy. On 4/29/24, patient underwent an elective robotic-assisted hiatal herniorraphy w/ gastric bypass. On 4/30, patient was having abdominal pain and vomiting with a concern for possible aspiration. Patient underwent CT abdomen and pelvis showed a narrowed JJ anastomosis. On 4/31, patient was taken back to the OR for a diagnostic laparoscopy and found a narrowed jejunojejunal anastomosis, released and revised w/ negative leak test.    This morning, patient was found to by hypotensive and tachycardic on 4C by blue team. SICU consulted for resuscitation and hemodynamic instabililty. Patient was seen bedside on 4C. Patient is a/o x3, systolic BP in the 80s (improving to the 90s with fluid boluses), -110 sinus rhythm, SpO2 99% on NRFM at 15 LPM. Patient reports lower back pain and lower abdominal pain. Patient denies chest pain, dyspnea (although full inspiratory effort is limited by abdominal pain), nausea, vomiting, lightheadedness, dizziness.    Blue team bedside and says that current abdominal exam is her baseline and has not changed since post-op.      24 Hour Events    5/8   NIGHT  -A line positional, 340/30/60/10, cuff 's SBP, prop 50 fent at 1, following commands  -CECE serosanguinous, abd soft, non distended, NJ to suction, Levo 0.1  -zofran x1 for gagging, QTC normal on EKG  - replacing A line     AM  - pending cards eval for hyperdynamic HF on echo, severe pulm HTN  - KUB --> NJT in place, hard to tell if past anastamosis  - SAT  - restarted zosyn  - f/up procal   - bradycardic to HR 27 --> self-resolved, atropine at bedside prn --> switched nabil to levo  - sputum cx --> neg  - switched nabil to levo due to bradycardia  - f/u PM abg   SARAH BERGMAN   [id number]   [birth year]  58yF    ============================   SICU Consult for hemodynamic instability requiring vasopressor support    HPI   57 y/o female BMI 42.4 PMH HTN, RAFA (not on home CPAP/BiPAP) GERD, pseudogout, migraines, renal stones, depression, asthma; PSH bladder repair, cholecystectomy. On 4/29/24, patient underwent an elective robotic-assisted hiatal herniorraphy w/ gastric bypass. On 4/30, patient was having abdominal pain and vomiting with a concern for possible aspiration. Patient underwent CT abdomen and pelvis showed a narrowed JJ anastomosis. On 4/31, patient was taken back to the OR for a diagnostic laparoscopy and found a narrowed jejunojejunal anastomosis, released and revised w/ negative leak test.    This morning, patient was found to by hypotensive and tachycardic on 4C by blue team. SICU consulted for resuscitation and hemodynamic instabililty. Patient was seen bedside on 4C. Patient is a/o x3, systolic BP in the 80s (improving to the 90s with fluid boluses), -110 sinus rhythm, SpO2 99% on NRFM at 15 LPM. Patient reports lower back pain and lower abdominal pain. Patient denies chest pain, dyspnea (although full inspiratory effort is limited by abdominal pain), nausea, vomiting, lightheadedness, dizziness.    Blue team bedside and says that current abdominal exam is her baseline and has not changed since post-op.      24 Hour Events    5/8   NIGHT  -A line positional, 340/30/60/10, cuff 's SBP, prop 50 fent at 1, following commands  -CECE serosanguinous, abd soft, non distended, NJ to suction, Levo 0.1  -zofran x1 for gagging, QTC normal on EKG  - replacing A line     AM  - pending cards eval for hyperdynamic HF on echo, severe pulm HTN  - KUB --> NJT in place, hard to tell if past anastamosis  - SAT  - restarted zosyn  - f/up procal   - bradycardic to HR 27 --> self-resolved, atropine at bedside prn --> switched nabil to levo  - sputum cx --> neg  - switched nabil to levo due to bradycardia  - f/u PM abg    [] A ten-point review of systems was otherwise negative except as noted above.  [ x ] Due to altered mental status/intubation, subjective information was not attained from the patient. History was obtained, to the extent possible, from review of the chart and collateral sources of information.  =======================================  Daily     Daily     Diet, NPO:   Except Medications (05-07-24 @ 19:15)      CURRENT MEDS:  Neurologic Medications  fentaNYL   Infusion 0.5 MICROgram(s)/kG/Hr IV Continuous <Continuous>  ondansetron Injectable 4 milliGRAM(s) IV Push every 8 hours PRN Nausea and/or Vomiting  propofol Infusion 10 MICROgram(s)/kG/Min IV Continuous <Continuous>    Respiratory Medications  albuterol    90 MICROgram(s) HFA Inhaler 2 Puff(s) Inhalation every 6 hours PRN Shortness of Breath and/or Wheezing  sodium chloride 3%  Inhalation 4 milliLiter(s) Inhalation every 12 hours    Cardiovascular Medications  norepinephrine Infusion 0.05 MICROgram(s)/kG/Min IV Continuous <Continuous>    Gastrointestinal Medications  atropine Injectable 0.5 milliGRAM(s) IntraMuscular once  pantoprazole  Injectable 40 milliGRAM(s) IV Push every 24 hours  sodium phosphate 15 milliMole(s)/250 mL IVPB 15 milliMole(s) IV Intermittent once    Genitourinary Medications    Hematologic/Oncologic Medications  enoxaparin Injectable 40 milliGRAM(s) SubCutaneous every 12 hours    Antimicrobial/Immunologic Medications  piperacillin/tazobactam IVPB.. 4.5 Gram(s) IV Intermittent every 8 hours    Endocrine/Metabolic Medications    Topical/Other Medications  artificial  tears Solution 1 Drop(s) Both EYES two times a day  chlorhexidine 0.12% Liquid 15 milliLiter(s) Oral Mucosa every 12 hours  chlorhexidine 2% Cloths 1 Application(s) Topical <User Schedule>      ICU Vital Signs Last 24 Hrs  T(C): 37 (09 May 2024 04:00), Max: 37.4 (09 May 2024 00:00)  T(F): 98.6 (09 May 2024 04:00), Max: 99.3 (09 May 2024 00:00)  HR: 76 (09 May 2024 07:00) (54 - 93)  BP: 109/56 (09 May 2024 07:00) (104/50 - 153/59)  BP(mean): 80 (09 May 2024 07:00) (70 - 97)  ABP: 99/46 (09 May 2024 07:00) (-45/47 - 161/77)  ABP(mean): 65 (09 May 2024 07:00) (56 - 115)  RR: 30 (09 May 2024 07:00) (1 - 38)  SpO2: 98% (09 May 2024 07:00) (92% - 100%)    O2 Parameters below as of 09 May 2024 04:00  Patient On (Oxygen Delivery Method): ventilator            Mode: AC/ CMV (Assist Control/ Continuous Mandatory Ventilation)  RR (machine): 30  TV (machine): 340  FiO2: 50  PEEP: 10  ITime: 0.9  MAP: 17  PIP: 26    ABG - ( 09 May 2024 03:40 )  pH, Arterial: 7.37  pH, Blood: x     /  pCO2: 53    /  pO2: 109   / HCO3: 31    / Base Excess: 4.2   /  SaO2: 99.6                I&O's Summary    08 May 2024 07:01  -  09 May 2024 07:00  --------------------------------------------------------  IN: 1647.4 mL / OUT: 1485 mL / NET: 162.4 mL      I&O's Detail    08 May 2024 07:01  -  09 May 2024 07:00  --------------------------------------------------------  IN:    Dexmedetomidine: 56 mL    FentaNYL: 240.8 mL    IV PiggyBack: 275 mL    Norepinephrine: 249.9 mL    Phenylephrine: 184.8 mL    Propofol: 640.9 mL  Total IN: 1647.4 mL    OUT:    Indwelling Catheter - Urethral (mL): 1485 mL  Total OUT: 1485 mL    Total NET: 162.4 mL          05-08-24 @ 07:01  -  05-09-24 @ 07:00  --------------------------------------------------------  IN: 0 mL/kg/d / OUT: 13.26 mL/kg/d / NET: -13.26 mL/kg/d        PHYSICAL EXAM:    General: Pt lying comfortably in bed.     Neuro:  GCS:     = E   / V   / M      Neuro: Alert & oriented x 3, no focal deficits. CNs II-XII intact. PERRLA, EOMs intact. Strength 5/5 throughout, sensory to light touch intact.     Lungs: Clear to auscultation bilaterally, normal expansion/effort. Oxygen delivery: ** . Pulling ** on IS.  Vent: Mode: AC/ CMV (Assist Control/ Continuous Mandatory Ventilation), RR (machine): 30, TV (machine): 340, FiO2: 50, PEEP: 10, ITime: 0.9, MAP: 17, PIP: 26    Cardiovascular : S1, S2.  Regular rate and rhythm. Cardiac Rhythm: NSR  Peripheral edema:     GI: BS x 4. Abdomen soft, Non-tender, Non-distended. Gastrostomy / Jejunostomy tube in place, dressing c/d/i.  Nasogastric tube in place.  Colostomy / Ileostomy.      Wound: ***    Extremities: Extremities warm, pink, well-perfused.        - Pulse exam: Radial palpable/ dopplerable bilaterally. DP/PT ** palpable/ dopplerable bilaterally.     Derm: Good skin turgor, no skin breakdown.       - DTI: ***    : Nunez catheter in place/voiding freely.     CXR:     LABS:  CAPILLARY BLOOD GLUCOSE      POCT Blood Glucose.: 138 mg/dL (09 May 2024 06:49)  POCT Blood Glucose.: 145 mg/dL (09 May 2024 00:21)  POCT Blood Glucose.: 106 mg/dL (08 May 2024 19:02)  POCT Blood Glucose.: 101 mg/dL (08 May 2024 12:27)                          10.0   15.87 )-----------( 359      ( 09 May 2024 04:00 )             33.8       05-09    143  |  102  |  10  ----------------------------<  127<H>  4.0   |  27  |  0.5<L>    Ca    8.1<L>      09 May 2024 04:00  Phos  2.8     05-09  Mg     1.9     05-09    TPro  5.6<L>  /  Alb  2.8<L>  /  TBili  0.3  /  DBili  0.2  /  AST  26  /  ALT  37  /  AlkPhos  111  05-07        CARDIAC MARKERS ( 07 May 2024 20:38 )  x     / x     / 672 U/L / x     / x          Urinalysis Basic - ( 09 May 2024 04:00 )    Color: x / Appearance: x / SG: x / pH: x  Gluc: 127 mg/dL / Ketone: x  / Bili: x / Urobili: x   Blood: x / Protein: x / Nitrite: x   Leuk Esterase: x / RBC: x / WBC x   Sq Epi: x / Non Sq Epi: x / Bacteria: x        Culture - Sputum (collected 07 May 2024 12:25)  Source: ET Tube ET Tube  Gram Stain (07 May 2024 23:14):    No polymorphonuclear leukocytes per low power field    No Squamous epithelial cells per low power field    Rare Gram positive cocci in pairs per oil power field  Preliminary Report (08 May 2024 15:34):    Normal Respiratory Jennifer present    Culture - Sputum (collected 06 May 2024 13:40)  Source: ET Tube ET Tube  Gram Stain (07 May 2024 07:51):    Moderate polymorphonuclear leukocytes per low power field    Moderate Squamous epithelial cells per low power field    Few Gram Negative Rods seen per oil power field    Few Gram positive cocci in pairs seen per oil power field  Final Report (08 May 2024 15:43):    Numerous Klebsiella pneumoniae    Normal Respiratory Jennifer present  Organism: Klebsiella pneumoniae (08 May 2024 15:43)  Organism: Klebsiella pneumoniae (08 May 2024 15:43)

## 2024-05-09 NOTE — PROGRESS NOTE ADULT - SUBJECTIVE AND OBJECTIVE BOX
GENERAL SURGERY PROGRESS NOTE     SARAH BERGMAN  [address] [place] [id number] day :11d    POD:10d  Procedure: Creation, bypass, gastric, laparoscopic, robot-assisted    Robot-assisted hiatal herniorrhaphy using da Madhuri Xi    Creation, bypass, gastric, laparoscopic, robot-assisted    Diagnostic laparoscopy    Revision of anastomosis of small intestine    Laparoscopic lysis of abdominal adhesions    Upper endoscopy    Release of small bowel obstruction    Midline catheter insertion    Arterial cannulation, percutaneous, for monitoring    Ultrasound guided venous access      Surgical Attending: Enedelia Montoya  Overnight events: Pt remains intubated, sedated and mechanically ventilated with settings 340/30/50/10. She still requires pressors at this time and was switched from Vaso to levo currently at .1.    T(F): 97.9 (05-09-24 @ 08:00), Max: 99.3 (05-09-24 @ 00:00)  HR: 79 (05-09-24 @ 09:00) (54 - 93)  BP: 112/55 (05-09-24 @ 09:00) (101/57 - 153/59)  ABP: 113/52 (05-09-24 @ 09:00) (-45/47 - 161/77)  ABP(mean): 73 (05-09-24 @ 09:00) (56 - 115)  RR: 30 (05-09-24 @ 09:00) (1 - 38)  SpO2: 98% (05-09-24 @ 09:00) (93% - 100%)    IN'S / OUT's:    05-08-24 @ 07:01  -  05-09-24 @ 07:00  --------------------------------------------------------  IN:    Dexmedetomidine: 56 mL    FentaNYL: 240.8 mL    IV PiggyBack: 275 mL    Norepinephrine: 249.9 mL    Phenylephrine: 184.8 mL    Propofol: 640.9 mL  Total IN: 1647.4 mL    OUT:    Indwelling Catheter - Urethral (mL): 1485 mL  Total OUT: 1485 mL    Total NET: 162.4 mL      05-09-24 @ 07:01  -  05-09-24 @ 09:44  --------------------------------------------------------  IN:    FentaNYL: 22.4 mL    IV PiggyBack: 25 mL    Norepinephrine: 33.6 mL    Propofol: 60.5 mL  Total IN: 141.5 mL    OUT:    Indwelling Catheter - Urethral (mL): 135 mL  Total OUT: 135 mL    Total NET: 6.5 mL          PHYSICAL EXAM:  GENERAL: Intubated and sedated, NGT in place  CHEST/LUNG: Equal chest rise B/L, intubated and mechanically ventiated  HEART: Regular rate and rhythm  ABDOMEN: Soft, Nontender, Nondistended; CECE drain in place draining serous sanguinous, Well healing laparoscopic incisions  EXTREMITIES:  No clubbing, cyanosis, or edema      LABS  Labs:  CAPILLARY BLOOD GLUCOSE      POCT Blood Glucose.: 138 mg/dL (09 May 2024 06:49)  POCT Blood Glucose.: 145 mg/dL (09 May 2024 00:21)  POCT Blood Glucose.: 106 mg/dL (08 May 2024 19:02)  POCT Blood Glucose.: 101 mg/dL (08 May 2024 12:27)                          10.0   15.87 )-----------( 359      ( 09 May 2024 04:00 )             33.8       Auto Neutrophil %: 70.7 % (05-09-24 @ 04:00)  Auto Immature Granulocyte %: 4.1 % (05-09-24 @ 04:00)    05-09    143  |  102  |  10  ----------------------------<  127<H>  4.0   |  27  |  0.5<L>      Calcium: 8.1 mg/dL (05-09-24 @ 04:00)      LFTs:             5.6  | 0.3  | 26       ------------------[111     ( 07 May 2024 20:38 )  2.8  | 0.2  | 37          Lipase:x      Amylase:x         Blood Gas Arterial, Lactate: 1.0 mmol/L (05-09-24 @ 03:40)  Blood Gas Arterial, Lactate: 1.2 mmol/L (05-08-24 @ 18:46)  Blood Gas Arterial, Lactate: 1.0 mmol/L (05-08-24 @ 05:18)  Blood Gas Arterial, Lactate: 1.1 mmol/L (05-07-24 @ 23:21)  Blood Gas Arterial, Lactate: 1.2 mmol/L (05-07-24 @ 13:49)  Blood Gas Arterial, Lactate: 0.8 mmol/L (05-07-24 @ 09:29)  Blood Gas Arterial, Lactate: 0.9 mmol/L (05-07-24 @ 04:30)  Blood Gas Arterial, Lactate: 1.0 mmol/L (05-06-24 @ 20:14)  Blood Gas Arterial, Lactate: 1.2 mmol/L (05-06-24 @ 15:48)  Blood Gas Arterial, Lactate: 1.1 mmol/L (05-06-24 @ 10:36)    ABG - ( 09 May 2024 03:40 )  pH: 7.37  /  pCO2: 53    /  pO2: 109   / HCO3: 31    / Base Excess: 4.2   /  SaO2: 99.6            ABG - ( 08 May 2024 18:46 )  pH: 7.46  /  pCO2: 41    /  pO2: 157   / HCO3: 29    / Base Excess: 4.9   /  SaO2: 100.0           ABG - ( 08 May 2024 05:18 )  pH: 7.41  /  pCO2: 50    /  pO2: 118   / HCO3: 32    / Base Excess: 6.1   /  SaO2: 99.2              Coags:    CARDIAC MARKERS ( 07 May 2024 20:38 )  x     / x     / 672 U/L / x     / x              Urinalysis Basic - ( 09 May 2024 04:00 )    Color: x / Appearance: x / SG: x / pH: x  Gluc: 127 mg/dL / Ketone: x  / Bili: x / Urobili: x   Blood: x / Protein: x / Nitrite: x   Leuk Esterase: x / RBC: x / WBC x   Sq Epi: x / Non Sq Epi: x / Bacteria: x        Culture - Sputum (collected 07 May 2024 12:25)  Source: ET Tube ET Tube  Gram Stain (07 May 2024 23:14):    No polymorphonuclear leukocytes per low power field    No Squamous epithelial cells per low power field    Rare Gram positive cocci in pairs per oil power field  Preliminary Report (08 May 2024 15:34):    Normal Respiratory Jennifer present    Culture - Sputum (collected 06 May 2024 13:40)  Source: ET Tube ET Tube  Gram Stain (07 May 2024 07:51):    Moderate polymorphonuclear leukocytes per low power field    Moderate Squamous epithelial cells per low power field    Few Gram Negative Rods seen per oil power field    Few Gram positive cocci in pairs seen per oil power field  Final Report (08 May 2024 15:43):    Numerous Klebsiella pneumoniae    Normal Respiratory Jennifer present  Organism: Klebsiella pneumoniae (08 May 2024 15:43)  Organism: Klebsiella pneumoniae (08 May 2024 15:43)          RADIOLOGY & ADDITIONAL TESTS:      A/P:  SARAH BERGMAN is a 58y F w/ PMHx of  obesity (BMI 43), GERD, RAFA (CPAP), HTN, ASTHMA admitted for elective gastric bypass and hiatal hernia repair, POD 2, Hosp day 3. Now is POD 0 for RTOR for diagnostic lap with JJ anastomosis revision, JASON, and upper endoscopic release of SBO.      PLAN:   - monitor CECE drain output quality and quantity  - Strict I/O's  - continue to hold tube feeds, NPO except meds  - Continue Hemodynamic monitoring   - monitor respiratory status, wean vent settings as tolerated  - wean pressor requirements as tolerated  - Continue with Zosyn 4.5g q8  - DVT and GI ppx  - multi modal pain control  - Rest of management per SICU      #Antibiotics: piperacillin/tazobactam IVPB.. 4.5 Gram(s) IV Intermittent every 8 hours, 05-08-24 @ 13:00   Day **  #DVT ppx: enoxaparin Injectable 40 milliGRAM(s) SubCutaneous every 12 hours    #GI ppx: pantoprazole  Injectable 40 milliGRAM(s) IV Push every 24 hours    Disposition:  SICU    Above plan discussed with Attending Surgeon Dr. Montoya , patient, patient family, and rest of health care team    [phone]

## 2024-05-09 NOTE — PROCEDURE NOTE - NSSITEPREP_SKIN_A_CORE
chlorhexidine/Adherence to aseptic technique: hand hygiene prior to donning barriers (gown, gloves), don cap and mask, sterile drape over patient
chlorhexidine
alcohol/chlorhexidine

## 2024-05-10 ENCOUNTER — APPOINTMENT (OUTPATIENT)
Dept: SURGERY | Facility: CLINIC | Age: 59
End: 2024-05-10

## 2024-05-10 LAB
ANION GAP SERPL CALC-SCNC: 9 MMOL/L — SIGNIFICANT CHANGE UP (ref 7–14)
BUN SERPL-MCNC: 6 MG/DL — LOW (ref 10–20)
CALCIUM SERPL-MCNC: 8.5 MG/DL — SIGNIFICANT CHANGE UP (ref 8.4–10.5)
CHLORIDE SERPL-SCNC: 104 MMOL/L — SIGNIFICANT CHANGE UP (ref 98–110)
CO2 SERPL-SCNC: 28 MMOL/L — SIGNIFICANT CHANGE UP (ref 17–32)
CREAT SERPL-MCNC: 0.5 MG/DL — LOW (ref 0.7–1.5)
EGFR: 109 ML/MIN/1.73M2 — SIGNIFICANT CHANGE UP
GAS PNL BLDA: SIGNIFICANT CHANGE UP
GLUCOSE BLDC GLUCOMTR-MCNC: 105 MG/DL — HIGH (ref 70–99)
GLUCOSE BLDC GLUCOMTR-MCNC: 112 MG/DL — HIGH (ref 70–99)
GLUCOSE SERPL-MCNC: 102 MG/DL — HIGH (ref 70–99)
HCT VFR BLD CALC: 34.1 % — LOW (ref 37–47)
HGB BLD-MCNC: 10.2 G/DL — LOW (ref 12–16)
MAGNESIUM SERPL-MCNC: 2.2 MG/DL — SIGNIFICANT CHANGE UP (ref 1.8–2.4)
MCHC RBC-ENTMCNC: 26.4 PG — LOW (ref 27–31)
MCHC RBC-ENTMCNC: 29.9 G/DL — LOW (ref 32–37)
MCV RBC AUTO: 88.3 FL — SIGNIFICANT CHANGE UP (ref 81–99)
NRBC # BLD: 0 /100 WBCS — SIGNIFICANT CHANGE UP (ref 0–0)
PHOSPHATE SERPL-MCNC: 2.9 MG/DL — SIGNIFICANT CHANGE UP (ref 2.1–4.9)
PLATELET # BLD AUTO: 303 K/UL — SIGNIFICANT CHANGE UP (ref 130–400)
PMV BLD: 9.7 FL — SIGNIFICANT CHANGE UP (ref 7.4–10.4)
POTASSIUM SERPL-MCNC: 4 MMOL/L — SIGNIFICANT CHANGE UP (ref 3.5–5)
POTASSIUM SERPL-SCNC: 4 MMOL/L — SIGNIFICANT CHANGE UP (ref 3.5–5)
RBC # BLD: 3.86 M/UL — LOW (ref 4.2–5.4)
RBC # FLD: 15.5 % — HIGH (ref 11.5–14.5)
SODIUM SERPL-SCNC: 141 MMOL/L — SIGNIFICANT CHANGE UP (ref 135–146)
WBC # BLD: 15.67 K/UL — HIGH (ref 4.8–10.8)
WBC # FLD AUTO: 15.67 K/UL — HIGH (ref 4.8–10.8)

## 2024-05-10 PROCEDURE — 99291 CRITICAL CARE FIRST HOUR: CPT | Mod: 24,25

## 2024-05-10 PROCEDURE — 71045 X-RAY EXAM CHEST 1 VIEW: CPT | Mod: 26

## 2024-05-10 RX ORDER — ACETAZOLAMIDE 250 MG/1
125 TABLET ORAL ONCE
Refills: 0 | Status: COMPLETED | OUTPATIENT
Start: 2024-05-10 | End: 2024-05-10

## 2024-05-10 RX ORDER — ACETAMINOPHEN 500 MG
650 TABLET ORAL EVERY 6 HOURS
Refills: 0 | Status: DISCONTINUED | OUTPATIENT
Start: 2024-05-10 | End: 2024-05-15

## 2024-05-10 RX ORDER — PANTOPRAZOLE SODIUM 20 MG/1
40 TABLET, DELAYED RELEASE ORAL EVERY 24 HOURS
Refills: 0 | Status: DISCONTINUED | OUTPATIENT
Start: 2024-05-10 | End: 2024-05-11

## 2024-05-10 RX ORDER — ACETAZOLAMIDE 250 MG/1
125 TABLET ORAL EVERY 24 HOURS
Refills: 0 | Status: DISCONTINUED | OUTPATIENT
Start: 2024-05-11 | End: 2024-05-11

## 2024-05-10 RX ORDER — ACETAMINOPHEN 500 MG
650 TABLET ORAL EVERY 6 HOURS
Refills: 0 | Status: DISCONTINUED | OUTPATIENT
Start: 2024-05-10 | End: 2024-05-10

## 2024-05-10 RX ORDER — POTASSIUM PHOSPHATE, MONOBASIC POTASSIUM PHOSPHATE, DIBASIC 236; 224 MG/ML; MG/ML
15 INJECTION, SOLUTION INTRAVENOUS ONCE
Refills: 0 | Status: COMPLETED | OUTPATIENT
Start: 2024-05-10 | End: 2024-05-10

## 2024-05-10 RX ORDER — HYDROMORPHONE HYDROCHLORIDE 2 MG/ML
0.5 INJECTION INTRAMUSCULAR; INTRAVENOUS; SUBCUTANEOUS EVERY 4 HOURS
Refills: 0 | Status: DISCONTINUED | OUTPATIENT
Start: 2024-05-10 | End: 2024-05-13

## 2024-05-10 RX ORDER — HYDROMORPHONE HYDROCHLORIDE 2 MG/ML
0.2 INJECTION INTRAMUSCULAR; INTRAVENOUS; SUBCUTANEOUS EVERY 12 HOURS
Refills: 0 | Status: DISCONTINUED | OUTPATIENT
Start: 2024-05-10 | End: 2024-05-13

## 2024-05-10 RX ORDER — ONDANSETRON 8 MG/1
4 TABLET, FILM COATED ORAL EVERY 8 HOURS
Refills: 0 | Status: DISCONTINUED | OUTPATIENT
Start: 2024-05-10 | End: 2024-05-15

## 2024-05-10 RX ORDER — PROPOFOL 10 MG/ML
10 INJECTION, EMULSION INTRAVENOUS
Qty: 500 | Refills: 0 | Status: DISCONTINUED | OUTPATIENT
Start: 2024-05-10 | End: 2024-05-10

## 2024-05-10 RX ADMIN — Medication 650 MILLIGRAM(S): at 12:41

## 2024-05-10 RX ADMIN — ACETAZOLAMIDE 125 MILLIGRAM(S): 250 TABLET ORAL at 10:20

## 2024-05-10 RX ADMIN — Medication 650 MILLIGRAM(S): at 17:40

## 2024-05-10 RX ADMIN — PIPERACILLIN AND TAZOBACTAM 25 GRAM(S): 4; .5 INJECTION, POWDER, LYOPHILIZED, FOR SOLUTION INTRAVENOUS at 04:45

## 2024-05-10 RX ADMIN — POTASSIUM PHOSPHATE, MONOBASIC POTASSIUM PHOSPHATE, DIBASIC 63.75 MILLIMOLE(S): 236; 224 INJECTION, SOLUTION INTRAVENOUS at 23:26

## 2024-05-10 RX ADMIN — PROPOFOL 6.72 MICROGRAM(S)/KG/MIN: 10 INJECTION, EMULSION INTRAVENOUS at 00:30

## 2024-05-10 RX ADMIN — PIPERACILLIN AND TAZOBACTAM 25 GRAM(S): 4; .5 INJECTION, POWDER, LYOPHILIZED, FOR SOLUTION INTRAVENOUS at 12:36

## 2024-05-10 RX ADMIN — PROPOFOL 6.72 MICROGRAM(S)/KG/MIN: 10 INJECTION, EMULSION INTRAVENOUS at 04:45

## 2024-05-10 RX ADMIN — CHLORHEXIDINE GLUCONATE 15 MILLILITER(S): 213 SOLUTION TOPICAL at 05:01

## 2024-05-10 RX ADMIN — ENOXAPARIN SODIUM 40 MILLIGRAM(S): 100 INJECTION SUBCUTANEOUS at 17:40

## 2024-05-10 RX ADMIN — Medication 1 DROP(S): at 05:02

## 2024-05-10 RX ADMIN — Medication 650 MILLIGRAM(S): at 13:41

## 2024-05-10 RX ADMIN — PIPERACILLIN AND TAZOBACTAM 25 GRAM(S): 4; .5 INJECTION, POWDER, LYOPHILIZED, FOR SOLUTION INTRAVENOUS at 20:28

## 2024-05-10 RX ADMIN — ENOXAPARIN SODIUM 40 MILLIGRAM(S): 100 INJECTION SUBCUTANEOUS at 05:01

## 2024-05-10 RX ADMIN — PROPOFOL 6.72 MICROGRAM(S)/KG/MIN: 10 INJECTION, EMULSION INTRAVENOUS at 06:44

## 2024-05-10 RX ADMIN — CHLORHEXIDINE GLUCONATE 15 MILLILITER(S): 213 SOLUTION TOPICAL at 18:03

## 2024-05-10 RX ADMIN — CHLORHEXIDINE GLUCONATE 1 APPLICATION(S): 213 SOLUTION TOPICAL at 05:02

## 2024-05-10 RX ADMIN — PROPOFOL 6.72 MICROGRAM(S)/KG/MIN: 10 INJECTION, EMULSION INTRAVENOUS at 04:02

## 2024-05-10 RX ADMIN — PANTOPRAZOLE SODIUM 40 MILLIGRAM(S): 20 TABLET, DELAYED RELEASE ORAL at 17:44

## 2024-05-10 RX ADMIN — ONDANSETRON 4 MILLIGRAM(S): 8 TABLET, FILM COATED ORAL at 01:39

## 2024-05-10 NOTE — PROGRESS NOTE ADULT - ASSESSMENT
ASSESSMENT:  SARAH BERGMAN is a 58y F w/ PMHx of  obesity (BMI 43), GERD, ARFA (CPAP), HTN, ASTHMA admitted for elective gastric bypass and hiatal hernia repair, POD 2, Hosp day 3. Now is POD 0 for RTOR for diagnostic lap with JJ anastomosis revision, JASON, and upper endoscopic release of SBO.    PLAN:   - monitor CECE drain output quality and quantity  - continue to hold tube feeds, NPO except meds, continue to keep NJ tube to low continuous suction  - Continue hemodynamic monitoring   - Monitor respiratory status, wean vent settings as tolerated  - Wean pressor requirements as tolerated  - Dignishield in place, monitor output  - Daily AM CXR  - F/U final sputum culture results  - Continue with Zosyn 4.5g q8  - DVT and GI ppx  - Multi-modal pain control  - Rest of management per SICU    [phone]

## 2024-05-10 NOTE — PROGRESS NOTE ADULT - SUBJECTIVE AND OBJECTIVE BOX
GENERAL SURGERY PROGRESS NOTE     SARAH BERGMAN  58y  Female  Hospital day :12d  POD:11d  Procedure: Robot-assisted hiatal herniorrhaphy using da Madhuri Xi    Creation, bypass, gastric, laparoscopic, robot-assisted    Diagnostic laparoscopy    Revision of anastomosis of small intestine    Laparoscopic lysis of abdominal adhesions    Upper endoscopy    Release of small bowel obstruction    Midline catheter insertion    Arterial cannulation, percutaneous, for monitoring    Ultrasound guided venous access    OVERNIGHT EVENTS:  - Intubated with vent settings 50/8  - On levo @ 0.07  - On propofol and fentanyl for sedation  - Dignishield, CECE drain, and NJ tube in place  - Urine output 50cc/hr  - 1g Mg given    T(F): 98.2 (05-10-24 @ 08:00), Max: 99.3 (05-10-24 @ 00:00)  HR: 106 (05-10-24 @ 09:00) (71 - 112)  BP: 118/58 (05-10-24 @ 09:00) (100/58 - 177/73)  ABP: 158/147 (05-10-24 @ 09:00) (90/41 - 174/76)  ABP(mean): 150 (05-10-24 @ 09:00) (58 - 150)  RR: 25 (05-10-24 @ 09:) (15 - 38)  SpO2: 99% (05-10-24 @ 09:08) (95% - 100%)    DIET/FLUIDS:   N24 @ 07:01  -  05-10-24 @ 07:00  --------------------------------------------------------  OUT: 500 mL                                                                                 DRAINS:   24 07:01  -  05-10-24 @ 07:00  --------------------------------------------------------  OUT: 65 mL      BM:   24 @ 07:01  -  05-10-24 @ 07:00  --------------------------------------------------------  OUT: 200 mL    URINE:   24 @ 07:01  -  05-10-24 @ 07:00  --------------------------------------------------------  OUT: 1305 mL       GI proph:  pantoprazole  Injectable 40 milliGRAM(s) IV Push every 24 hours    AC/ proph: enoxaparin Injectable 40 milliGRAM(s) SubCutaneous every 12 hours    ABx: piperacillin/tazobactam IVPB.. 4.5 Gram(s) IV Intermittent every 8 hours      PHYSICAL EXAM:  GENERAL: NAD  CHEST/LUNG: Intubated  HEART: Regular rate and rhythm  ABDOMEN: Soft, Nontender, Mildly distended; Nathanield, CECE drain, and NJ tube in place      LABS  Labs:  CAPILLARY BLOOD GLUCOSE  POCT Blood Glucose.: 112 mg/dL (10 May 2024 06:41)  POCT Blood Glucose.: 116 mg/dL (09 May 2024 23:48)  POCT Blood Glucose.: 120 mg/dL (09 May 2024 17:07)                          9.7    16.38 )-----------( 349      ( 09 May 2024 19:25 )             31.9       Auto Neutrophil %: 76.4 % (24 @ 19:25)  Auto Immature Granulocyte %: 2.3 % (24 @ 19:25)        140  |  101  |  7<L>  ----------------------------<  105<H>  4.5   |  29  |  <0.5<L>      Calcium: 8.1 mg/dL (24 @ 19:25)      LFTs:     Blood Gas Arterial, Lactate: 0.9 mmol/L (05-10-24 @ 03:34)  Blood Gas Arterial, Lactate: 1.0 mmol/L (24 @ 21:04)  Blood Gas Arterial, Lactate: 1.1 mmol/L (24 @ 16:10)  Blood Gas Arterial, Lactate: 1.0 mmol/L (24 @ 03:40)  Blood Gas Arterial, Lactate: 1.2 mmol/L (24 @ 18:46)  Blood Gas Arterial, Lactate: 1.0 mmol/L (24 @ 05:18)  Blood Gas Arterial, Lactate: 1.1 mmol/L (24 @ 23:21)  Blood Gas Arterial, Lactate: 1.2 mmol/L (24 @ 13:49)  Blood Gas Arterial, Lactate: 0.8 mmol/L (24 @ 09:29)    ABG - ( 10 May 2024 03:34 )  pH: 7.40  /  pCO2: 49    /  pO2: 91    / HCO3: 30    / Base Excess: 4.8   /  SaO2: 98.9            ABG - ( 09 May 2024 21:04 )  pH: 7.42  /  pCO2: 48    /  pO2: 88    / HCO3: 31    / Base Excess: 5.5   /  SaO2: 98.0            ABG - ( 09 May 2024 16:10 )  pH: 7.44  /  pCO2: 46    /  pO2: 85    / HCO3: 31    / Base Excess: 6.2   /  SaO2: 98.4      Urinalysis Basic - ( 09 May 2024 19:25 )    Color: x / Appearance: x / SG: x / pH: x  Gluc: 105 mg/dL / Ketone: x  / Bili: x / Urobili: x   Blood: x / Protein: x / Nitrite: x   Leuk Esterase: x / RBC: x / WBC x   Sq Epi: x / Non Sq Epi: x / Bacteria: x        Culture - Sputum (collected 07 May 2024 12:25)  Source: ET Tube ET Tube  Gram Stain (07 May 2024 23:14):    No polymorphonuclear leukocytes per low power field    No Squamous epithelial cells per low power field    Rare Gram positive cocci in pairs per oil power field  Preliminary Report (08 May 2024 15:34):    Normal Respiratory Jennifer present          RADIOLOGY & ADDITIONAL TESTS:  < from: Xray Chest 1 View- PORTABLE-Routine (Xray Chest 1 View- PORTABLE-Routine in AM.) (05.10.24 @ 06:50) >  Impression:  Bilateral opacities. Support devices as described. Stable.    < from: [facility] Duplex Lower Ext Vein Scan, Bilat (24 @ 13:04) >  IMPRESSION:  No evidence of deep venous thrombosis in either lower extremity.

## 2024-05-10 NOTE — PROGRESS NOTE ADULT - SUBJECTIVE AND OBJECTIVE BOX
SARAH BERGMAN   [id number]   65  58yF    ============================   SICU Consult for hemodynamic instability requiring vasopressor support    HPI   57 y/o female BMI 42.4 PMH HTN, RAFA (not on home CPAP/BiPAP) GERD, pseudogout, migraines, renal stones, depression, asthma; PSH bladder repair, cholecystectomy. On 24, patient underwent an elective robotic-assisted hiatal herniorraphy w/ gastric bypass. On , patient was having abdominal pain and vomiting with a concern for possible aspiration. Patient underwent CT abdomen and pelvis showed a narrowed JJ anastomosis. On , patient was taken back to the OR for a diagnostic laparoscopy and found a narrowed jejunojejunal anastomosis, released and revised w/ negative leak test.    This morning, patient was found to by hypotensive and tachycardic on 4C by blue team. SICU consulted for resuscitation and hemodynamic instabililty. Patient was seen bedside on 4C. Patient is a/o x3, systolic BP in the 80s (improving to the 90s with fluid boluses), -110 sinus rhythm, SpO2 99% on NRFM at 15 LPM. Patient reports lower back pain and lower abdominal pain. Patient denies chest pain, dyspnea (although full inspiratory effort is limited by abdominal pain), nausea, vomiting, lightheadedness, dizziness.    Blue team bedside and says that current abdominal exam is her baseline and has not changed since post-op.      24 Hour Events    5/9  PM  -1gm mag  -UOP 50cc/hr  -Levo 0.06  -340/30/50/8  - MN AB.42/48/88/31  -following commands, prop 50, fent 1.5    AM  -Decreased peep, 340/30/50/8  -Add Vancomycin  -F/U Dupplex US   -Chest x-ray tomorrow AM  -Duplex negative for DVT b/l  -FiO2 50%, 7.44/46/85/31 lac 1.1 --> FiO2 40%    [] A ten-point review of systems was otherwise negative except as noted above.  [ x ] Due to altered mental status/intubation, subjective information was not attained from the patient. History was obtained, to the extent possible, from review of the chart and collateral sources of information.  ================================================================== SARAH BERGMAN   [id number]   65  58yF    ============================   SICU Consult for hemodynamic instability requiring vasopressor support    HPI   59 y/o female BMI 42.4 PMH HTN, RAFA (not on home CPAP/BiPAP) GERD, pseudogout, migraines, renal stones, depression, asthma; PSH bladder repair, cholecystectomy. On 24, patient underwent an elective robotic-assisted hiatal herniorraphy w/ gastric bypass. On , patient was having abdominal pain and vomiting with a concern for possible aspiration. Patient underwent CT abdomen and pelvis showed a narrowed JJ anastomosis. On , patient was taken back to the OR for a diagnostic laparoscopy and found a narrowed jejunojejunal anastomosis, released and revised w/ negative leak test.    This morning, patient was found to by hypotensive and tachycardic on 4C by blue team. SICU consulted for resuscitation and hemodynamic instabililty. Patient was seen bedside on 4C. Patient is a/o x3, systolic BP in the 80s (improving to the 90s with fluid boluses), -110 sinus rhythm, SpO2 99% on NRFM at 15 LPM. Patient reports lower back pain and lower abdominal pain. Patient denies chest pain, dyspnea (although full inspiratory effort is limited by abdominal pain), nausea, vomiting, lightheadedness, dizziness.    Blue team bedside and says that current abdominal exam is her baseline and has not changed since post-op.      24 Hour Events    5/9  PM  -1gm mag  -UOP 50cc/hr  -Levo 0.06  -340/30/50/8  - MN AB.42/48/88/31  -following commands, prop 50, fent 1.5    AM  -Decreased peep, 340/30/50/8  -Add Vancomycin  -F/U Dupplex US   -Chest x-ray tomorrow AM  -Duplex negative for DVT b/l  -FiO2 50%, 7.44/46/85/31 lac 1.1 --> FiO2 40%    [] A ten-point review of systems was otherwise negative except as noted above.  [ x ] Due to altered mental status/intubation, subjective information was not attained from the patient. History was obtained, to the extent possible, from review of the chart and collateral sources of information.  ==================================================================    Daily     Daily     Diet, NPO:   Except Medications (24 @ 19:15)      CURRENT MEDS:  Neurologic Medications  fentaNYL   Infusion 0.5 MICROgram(s)/kG/Hr IV Continuous <Continuous>  ondansetron Injectable 4 milliGRAM(s) IV Push every 8 hours PRN Nausea and/or Vomiting  propofol Infusion 10 MICROgram(s)/kG/Min IV Continuous <Continuous>    Respiratory Medications  albuterol    90 MICROgram(s) HFA Inhaler 2 Puff(s) Inhalation every 6 hours PRN Shortness of Breath and/or Wheezing  sodium chloride 3%  Inhalation 4 milliLiter(s) Inhalation every 12 hours    Cardiovascular Medications  norepinephrine Infusion 0.05 MICROgram(s)/kG/Min IV Continuous <Continuous>    Gastrointestinal Medications  atropine Injectable 0.5 milliGRAM(s) IntraMuscular once  pantoprazole  Injectable 40 milliGRAM(s) IV Push every 24 hours    Genitourinary Medications    Hematologic/Oncologic Medications  enoxaparin Injectable 40 milliGRAM(s) SubCutaneous every 12 hours    Antimicrobial/Immunologic Medications  piperacillin/tazobactam IVPB.. 4.5 Gram(s) IV Intermittent every 8 hours    Endocrine/Metabolic Medications    Topical/Other Medications  artificial  tears Solution 1 Drop(s) Both EYES two times a day  chlorhexidine 0.12% Liquid 15 milliLiter(s) Oral Mucosa every 12 hours  chlorhexidine 2% Cloths 1 Application(s) Topical <User Schedule>      ICU Vital Signs Last 24 Hrs  T(C): 37 (10 May 2024 04:00), Max: 37.4 (10 May 2024 00:00)  T(F): 98.6 (10 May 2024 04:00), Max: 99.3 (10 May 2024 00:00)  HR: 72 (10 May 2024 07:00) (71 - 100)  BP: 130/60 (10 May 2024 07:00) (100/58 - 177/73)  BP(mean): 87 (10 May 2024 07:00) (73 - 105)  ABP: 107/51 (10 May 2024 07:00) (90/41 - 174/76)  ABP(mean): 72 (10 May 2024 07:00) (58 - 106)  RR: 30 (10 May 2024 07:00) (15 - 38)  SpO2: 97% (10 May 2024 07:00) (95% - 100%)    O2 Parameters below as of 10 May 2024 00:00  Patient On (Oxygen Delivery Method): ventilator            Mode: AC/ CMV (Assist Control/ Continuous Mandatory Ventilation)  RR (machine): 30  TV (machine): 340  FiO2: 50  PEEP: 8  ITime: 0.9  MAP: 17  PIP: 27    ABG - ( 10 May 2024 03:34 )  pH, Arterial: 7.40  pH, Blood: x     /  pCO2: 49    /  pO2: 91    / HCO3: 30    / Base Excess: 4.8   /  SaO2: 98.9                I&O's Summary    09 May 2024 07:01  -  10 May 2024 07:00  --------------------------------------------------------  IN: 2193.3 mL / OUT: 2370 mL / NET: -176.7 mL    10 May 2024 07:01  -  10 May 2024 08:42  --------------------------------------------------------  IN: 0 mL / OUT: 125 mL / NET: -125 mL      I&O's Detail    09 May 2024 07:01  -  10 May 2024 07:00  --------------------------------------------------------  IN:    FentaNYL: 156.8 mL    IV PiggyBack: 250 mL    IV PiggyBack: 250 mL    IV PiggyBack: 100 mL    IV PiggyBack: 300 mL    Norepinephrine: 357 mL    Propofol: 645.1 mL    Propofol: 134.4 mL  Total IN: 2193.3 mL    OUT:    Bulb (mL): 65 mL    Indwelling Catheter - Urethral (mL): 1305 mL    Nasogastric/Oral tube (mL): 500 mL    Other (mL): 300 mL    Rectal Tube (mL): 200 mL  Total OUT: 2370 mL    Total NET: -176.7 mL      10 May 2024 07:  -  10 May 2024 08:42  --------------------------------------------------------  IN:  Total IN: 0 mL    OUT:    FentaNYL: 0 mL    Indwelling Catheter - Urethral (mL): 125 mL    Norepinephrine: 0 mL    Propofol: 0 mL  Total OUT: 125 mL    Total NET: -125 mL          24 @ 07:01  -  05-10-24 @ 07:00  --------------------------------------------------------  IN: 0 mL/kg/d / OUT: 11.65 mL/kg/d / NET: -11.65 mL/kg/d    05-10-24 @ 07:01  -  05-10-24 @ 08:42  --------------------------------------------------------  IN: 0 mL/kg/d / OUT: 1.12 mL/kg/d / NET: -1.12 mL/kg/d        PHYSICAL EXAM:    General: Pt lying comfortably in bed.     Neuro:  GCS:     = E   / V   / M      Neuro: Alert & oriented x 3, no focal deficits. CNs II-XII intact. PERRLA, EOMs intact. Strength 5/5 throughout, sensory to light touch intact.     Lungs: Clear to auscultation bilaterally, normal expansion/effort. Oxygen delivery: ** . Pulling ** on IS.  Vent: Mode: AC/ CMV (Assist Control/ Continuous Mandatory Ventilation), RR (machine): 30, TV (machine): 340, FiO2: 50, PEEP: 8, ITime: 0.9, MAP: 17, PIP: 27    Cardiovascular : S1, S2.  Regular rate and rhythm. Cardiac Rhythm: NSR  Peripheral edema:     GI: BS x 4. Abdomen soft, Non-tender, Non-distended. Gastrostomy / Jejunostomy tube in place, dressing c/d/i.  Nasogastric tube in place.  Colostomy / Ileostomy.      Wound: ***    Extremities: Extremities warm, pink, well-perfused.        - Pulse exam: Radial palpable/ dopplerable bilaterally. DP/PT ** palpable/ dopplerable bilaterally.     Derm: Good skin turgor, no skin breakdown.       - DTI: ***    : Nunez catheter in place/voiding freely.     CXR:     LABS:  CAPILLARY BLOOD GLUCOSE      POCT Blood Glucose.: 112 mg/dL (10 May 2024 06:41)  POCT Blood Glucose.: 116 mg/dL (09 May 2024 23:48)  POCT Blood Glucose.: 120 mg/dL (09 May 2024 17:07)                          9.7    16.38 )-----------( 349      ( 09 May 2024 19:25 )             31.9       05-    140  |  101  |  7<L>  ----------------------------<  105<H>  4.5   |  29  |  <0.5<L>    Ca    8.1<L>      09 May 2024 19:25  Phos  3.2       Mg     1.9     05-              Urinalysis Basic - ( 09 May 2024 19:25 )    Color: x / Appearance: x / SG: x / pH: x  Gluc: 105 mg/dL / Ketone: x  / Bili: x / Urobili: x   Blood: x / Protein: x / Nitrite: x   Leuk Esterase: x / RBC: x / WBC x   Sq Epi: x / Non Sq Epi: x / Bacteria: x        Culture - Sputum (collected 07 May 2024 12:25)  Source: ET Tube ET Tube  Gram Stain (07 May 2024 23:14):    No polymorphonuclear leukocytes per low power field    No Squamous epithelial cells per low power field    Rare Gram positive cocci in pairs per oil power field  Preliminary Report (08 May 2024 15:34):    Normal Respiratory Jennifer present       SARAH BERGMAN   [id number]   65  58yF    ============================   SICU Consult for hemodynamic instability requiring vasopressor support    HPI   59 y/o female BMI 42.4 PMH HTN, RAFA (not on home CPAP/BiPAP) GERD, pseudogout, migraines, renal stones, depression, asthma; PSH bladder repair, cholecystectomy. On 24, patient underwent an elective robotic-assisted hiatal herniorraphy w/ gastric bypass. On , patient was having abdominal pain and vomiting with a concern for possible aspiration. Patient underwent CT abdomen and pelvis showed a narrowed JJ anastomosis. On , patient was taken back to the OR for a diagnostic laparoscopy and found a narrowed jejunojejunal anastomosis, released and revised w/ negative leak test.    This morning, patient was found to by hypotensive and tachycardic on 4C by blue team. SICU consulted for resuscitation and hemodynamic instabililty. Patient was seen bedside on 4C. Patient is a/o x3, systolic BP in the 80s (improving to the 90s with fluid boluses), -110 sinus rhythm, SpO2 99% on NRFM at 15 LPM. Patient reports lower back pain and lower abdominal pain. Patient denies chest pain, dyspnea (although full inspiratory effort is limited by abdominal pain), nausea, vomiting, lightheadedness, dizziness.    Blue team bedside and says that current abdominal exam is her baseline and has not changed since post-op.      24 Hour Events    5/9  PM  -1gm mag  -UOP 50cc/hr  -Levo 0.06  -340/30/50/8  - MN AB.42/48/88/31  -following commands, prop 50, fent 1.5    AM  -Decreased peep, 340/30/50/8  -Add Vancomycin  -F/U Dupplex US   -Chest x-ray tomorrow AM  -Duplex negative for DVT b/l  -FiO2 50%, 7.44/46/85/31 lac 1.1 --> FiO2 40%    [] A ten-point review of systems was otherwise negative except as noted above.  [ x ] Due to altered mental status/intubation, subjective information was not attained from the patient. History was obtained, to the extent possible, from review of the chart and collateral sources of information.  ==================================================================    Daily     Daily     Diet, NPO:   Except Medications (24 @ 19:15)      CURRENT MEDS:  Neurologic Medications  fentaNYL   Infusion 0.5 MICROgram(s)/kG/Hr IV Continuous <Continuous>  ondansetron Injectable 4 milliGRAM(s) IV Push every 8 hours PRN Nausea and/or Vomiting  propofol Infusion 10 MICROgram(s)/kG/Min IV Continuous <Continuous>    Respiratory Medications  albuterol    90 MICROgram(s) HFA Inhaler 2 Puff(s) Inhalation every 6 hours PRN Shortness of Breath and/or Wheezing  sodium chloride 3%  Inhalation 4 milliLiter(s) Inhalation every 12 hours    Cardiovascular Medications  norepinephrine Infusion 0.05 MICROgram(s)/kG/Min IV Continuous <Continuous>    Gastrointestinal Medications  atropine Injectable 0.5 milliGRAM(s) IntraMuscular once  pantoprazole  Injectable 40 milliGRAM(s) IV Push every 24 hours    Genitourinary Medications    Hematologic/Oncologic Medications  enoxaparin Injectable 40 milliGRAM(s) SubCutaneous every 12 hours    Antimicrobial/Immunologic Medications  piperacillin/tazobactam IVPB.. 4.5 Gram(s) IV Intermittent every 8 hours    Endocrine/Metabolic Medications    Topical/Other Medications  artificial  tears Solution 1 Drop(s) Both EYES two times a day  chlorhexidine 0.12% Liquid 15 milliLiter(s) Oral Mucosa every 12 hours  chlorhexidine 2% Cloths 1 Application(s) Topical <User Schedule>      ICU Vital Signs Last 24 Hrs  T(C): 37 (10 May 2024 04:00), Max: 37.4 (10 May 2024 00:00)  T(F): 98.6 (10 May 2024 04:00), Max: 99.3 (10 May 2024 00:00)  HR: 72 (10 May 2024 07:00) (71 - 100)  BP: 130/60 (10 May 2024 07:00) (100/58 - 177/73)  BP(mean): 87 (10 May 2024 07:00) (73 - 105)  ABP: 107/51 (10 May 2024 07:00) (90/41 - 174/76)  ABP(mean): 72 (10 May 2024 07:00) (58 - 106)  RR: 30 (10 May 2024 07:00) (15 - 38)  SpO2: 97% (10 May 2024 07:00) (95% - 100%)    O2 Parameters below as of 10 May 2024 00:00  Patient On (Oxygen Delivery Method): ventilator            Mode: AC/ CMV (Assist Control/ Continuous Mandatory Ventilation)  RR (machine): 30  TV (machine): 340  FiO2: 50  PEEP: 8  ITime: 0.9  MAP: 17  PIP: 27    ABG - ( 10 May 2024 03:34 )  pH, Arterial: 7.40  pH, Blood: x     /  pCO2: 49    /  pO2: 91    / HCO3: 30    / Base Excess: 4.8   /  SaO2: 98.9                I&O's Summary    09 May 2024 07:01  -  10 May 2024 07:00  --------------------------------------------------------  IN: 2193.3 mL / OUT: 2370 mL / NET: -176.7 mL    10 May 2024 07:01  -  10 May 2024 08:42  --------------------------------------------------------  IN: 0 mL / OUT: 125 mL / NET: -125 mL      I&O's Detail    09 May 2024 07:01  -  10 May 2024 07:00  --------------------------------------------------------  IN:    FentaNYL: 156.8 mL    IV PiggyBack: 250 mL    IV PiggyBack: 250 mL    IV PiggyBack: 100 mL    IV PiggyBack: 300 mL    Norepinephrine: 357 mL    Propofol: 645.1 mL    Propofol: 134.4 mL  Total IN: 2193.3 mL    OUT:    Bulb (mL): 65 mL    Indwelling Catheter - Urethral (mL): 1305 mL    Nasogastric/Oral tube (mL): 500 mL    Other (mL): 300 mL    Rectal Tube (mL): 200 mL  Total OUT: 2370 mL    Total NET: -176.7 mL      10 May 2024 07:  -  10 May 2024 08:42  --------------------------------------------------------  IN:  Total IN: 0 mL    OUT:    FentaNYL: 0 mL    Indwelling Catheter - Urethral (mL): 125 mL    Norepinephrine: 0 mL    Propofol: 0 mL  Total OUT: 125 mL    Total NET: -125 mL          24 @ 07:01  -  05-10-24 @ 07:00  --------------------------------------------------------  IN: 0 mL/kg/d / OUT: 11.65 mL/kg/d / NET: -11.65 mL/kg/d    05-10-24 @ 07:01  -  05-10-24 @ 08:42  --------------------------------------------------------  IN: 0 mL/kg/d / OUT: 1.12 mL/kg/d / NET: -1.12 mL/kg/d        PHYSICAL EXAM:    General: Pt lying comfortably in bed.     Neuro: Alert, able to follow commands, no focal deficits. CNs II-XII grossly intact. PERRLA, spontaneous EOMs. Moving all extremities.     Lungs: Clear to auscultation bilaterally, normal expansion/effort. Oxygen delivery: Vent: Mode: AC/ CMV (Assist Control/ Continuous Mandatory Ventilation), RR (machine): 30, TV (machine): 340, FiO2: 50, PEEP: 8, ITime: 0.9, MAP: 17, PIP: 27    Cardiovascular : S1, S2.  Regular rate and rhythm. Cardiac Rhythm: NSR    GI: BS x 4. Abdomen soft, Non-tender, Non-distended. NG tube in place.    Wound: Abdominal surgical incision sites intact, c/d/i.     Extremities: Extremities warm, pink, well-perfused.     Derm: Good skin turgor, no skin breakdown.     : Nunez catheter in place.    CXR: < from: Xray Chest 1 View- PORTABLE-Routine (Xray Chest 1 View- PORTABLE-Routine in AM.) (05.10.24 @ 06:50) >  Findings:    Support devices: Patient is intubated. Enteric catheter is seen.  Telemetry leads are identified.  Cardiac/mediastinum/hilum: Unremarkable.  Lung parenchyma/Pleura: Bilateral opacities  Skeleton/soft tissues: Unremarkable.  Impression: Bilateral opacities. Support devices as described. Stable.    < end of copied text >      LABS:  CAPILLARY BLOOD GLUCOSE      POCT Blood Glucose.: 112 mg/dL (10 May 2024 06:41)  POCT Blood Glucose.: 116 mg/dL (09 May 2024 23:48)  POCT Blood Glucose.: 120 mg/dL (09 May 2024 17:07)                          9.7    16.38 )-----------( 349      ( 09 May 2024 19:25 )             31.9           140  |  101  |  7<L>  ----------------------------<  105<H>  4.5   |  29  |  <0.5<L>    Ca    8.1<L>      09 May 2024 19:25  Phos  3.2       Mg     1.9                   Urinalysis Basic - ( 09 May 2024 19:25 )    Color: x / Appearance: x / SG: x / pH: x  Gluc: 105 mg/dL / Ketone: x  / Bili: x / Urobili: x   Blood: x / Protein: x / Nitrite: x   Leuk Esterase: x / RBC: x / WBC x   Sq Epi: x / Non Sq Epi: x / Bacteria: x        Culture - Sputum (collected 07 May 2024 12:25)  Source: ET Tube ET Tube  Gram Stain (07 May 2024 23:14):    No polymorphonuclear leukocytes per low power field    No Squamous epithelial cells per low power field    Rare Gram positive cocci in pairs per oil power field  Preliminary Report (08 May 2024 15:34):    Normal Respiratory Jennifer present

## 2024-05-10 NOTE — PROGRESS NOTE ADULT - ASSESSMENT
Assessment and Plan:	  58y Female s/p robotic-assisted hiatal herniorraphy w/ gastric bypass (), diagnostic laparoscopy with released and revised JJ anastomosis narrowing w/ negative leak test ().    NEUROLOGICAL:  #Sedation  - propofol  - D/c precedex due to bradycardia to HR 27  - ,   #Acute pain  - fentanyl drip  #Depression  - HOLDING home duloxetine  - HOLDING home wellbutrin  - HOLDING home amitriptyline    RESPIRATORY:   #Mechanical ventilation secondary to aspiration pneumonia with hypoxic respiratory failure  - intubated , LL 23, ET 7.5  - PRVC 340/30/60/10  - Duonebs  - albuterol inhaler  - AB.41/50/118/32  lac 1    CARDIOVASCULAR:   #Acute hypotension secondary to sedation requirement  - phenylephrine infusion currently at 0.5  #HTN  - HOLDING home antihypertensives (losartan, spironolactone)  - Elevated troponemia --> downtrended, no longer trending  - cardiology recs appreciated-->pending cards eval for echo with hyperdynamic HF and severe pulm HTN  ECHO (): EF 70%/G1DD/TR/severe pulmonary HTN    GASTROINTESTINAL/NUTRITION:   #s/p robotic-assisted hiatal herniorraphy w/ gastric bypass (),   # s/p diagnostic laparoscopy with released and revised JJ anastomosis narrowing w/ negative leak test ()  - Diet NPO x/rx  - LLQ CECE drain with serosanguinous output   - NG tube placement by Dr. Montoya 65cm --> NG tube slightly dislodged on  PM while patient turned, approximately 62cm at nare, primary team made aware --> KUB confirming placement?  - aspiration precautions  - head of bed 30 degrees  #GI Prophylaxis  - protonix IV - if change to PO, will need protonix granules  #Bowel regimen  - Last BM     /RENAL:   #urine output in critically ill  - bowden placed   #intermittent diuresis  - last dose lasix 20mg IV   Monitor UO-bowden in place  Labs:          BUN/Cr- 10/0.5  -->,  7/<0.5  -->          Electrolytes-( @ 19:25)Na 140 // K 4.5 // Mg 1.9 // Phos 3.2       #KIKA - resolved  - non-oliguric    HEME/ONC:   #DVT prophylaxis  - Lovenox 40 q12  - SCDs  Hb/Hct:  10.5/35.3  -->,  10.0/33.8  -->,  9.7/31.9  -->  Plts:  372  -->,  359  -->,  349  -->    PTT/INR:   T&S:    WBC- 13.71  --->>,  15.87  --->>,  16.38  --->>  Temp trend- 24hrs T(F): 99.1 ( @ 20:00), Max: 99.1 ( @ 20:00)  Antibiotics-piperacillin/tazobactam IVPB.. 4.5 every 8 hours  Antibiotics  - piperacillin/tazobactam IVPB.. 3.375 every 8 hours ( to end ) for severe aspiration pneumonitis, restarted   Cultures:  - MRSA negative  - sputum culture Numerous Klebsiella pneumoniae and Normal Respiratory Jennifer present prelim, gram stain final with few gpc, few gnr  - (): Tracheal aspirate culture: negative    - f/u procalcitonin    ENDOCRINOLOGY:  - POCT fingersticks q6 hours while NPO  - Glucose goal 140-180    MSK:  Activity - Bedrest (24 @ 07:34) [Active]    LINES/DRAINS:  PIVs, bilateral cephalic midlines (), LLQ drain (), R radial arterial line ( - ), left radial A line placed  bowden ( -     ADVANCED DIRECTIVES:  Full Code    HCP/Emergency Contact-    INDICATION FOR SICU: hemodynamic instability requiring vasopressor support    DISPO:  SICU Assessment and Plan:	  58y Female s/p robotic-assisted hiatal herniorraphy w/ gastric bypass (4/29), diagnostic laparoscopy with released and revised JJ anastomosis narrowing w/ negative leak test (4/30).    NEUROLOGICAL:  #Sedation  - No Precedex (episode of bradycardia)  - ,   #Acute pain  - acetaminophen   Oral Liquid .. 650 milliGRAM(s) Oral every 6 hours  - HYDROmorphone  Injectable 0.2 milliGRAM(s) IV Push every 12 hours PRN Moderate Pain (4 - 6)  - HYDROmorphone  Injectable 0.5 milliGRAM(s) IV Push every 4 hours PRN Severe Pain (7 - 10)  #Depression  - HOLDING home duloxetine  - HOLDING home wellbutrin  - HOLDING home amitriptyline    RESPIRATORY:   #Mechanical ventilation secondary to aspiration pneumonia with hypoxic respiratory failure - resolving  - intubated 5/5 - 5/10, extubated after parameters were met.   - NC 6L  - sputum culture pending    CARDIOVASCULAR:   #Acute hypotension secondary to sedation requirement - resolving  - levophed gtt currently off  #HTN  - HOLDING home antihypertensives (losartan, spironolactone)  - Elevated troponemia --> downtrended, no longer trending  - cardiology recs appreciated-->pending cards eval for echo with hyperdynamic HF and severe pulm HTN  ECHO (5/6): EF 70%/G1DD/TR/severe pulmonary HTN    GASTROINTESTINAL/NUTRITION:   #s/p robotic-assisted hiatal herniorraphy w/ gastric bypass (4/29),   # s/p diagnostic laparoscopy with released and revised JJ anastomosis narrowing w/ negative leak test (4/30)  - Diet NPO x/rx  - LLQ CECE drain with serosanguinous output   - NG tube - d/c'd 5/10  - aspiration precautions  - head of bed 30 degrees  #GI Prophylaxis  - protonix suspension  #Bowel regimen  - Last BM 5/8    /RENAL:   #urine output in critically ill  - bowden placed 5/5  #intermittent diuresis  - azetazolamide 125mg IV x 1  Monitor UO-bowden in place  Labs:          BUN/Cr- 10/0.5  -->,  7/<0.5  -->          Electrolytes-(05-09 @ 19:25)Na 140 // K 4.5 // Mg 1.9 // Phos 3.2     #KIKA - resolved  - non-oliguric    HEME/ONC:   #DVT prophylaxis  - Lovenox 40 q12  - SCDs  Hb/Hct:  10.5/35.3  -->,  10.0/33.8  -->,  9.7/31.9  -->  Plts:  372  -->,  359  -->,  349  -->    PTT/INR:   T&S:    WBC- 13.71  --->>,  15.87  --->>,  16.38  --->>  Temp trend- 24hrs T(F): 99.1 (05-09 @ 20:00), Max: 99.1 (05-09 @ 20:00)  Antibiotics-piperacillin/tazobactam IVPB.. 4.5 every 8 hours  Antibiotics  - piperacillin/tazobactam IVPB.. 3.375 every 8 hours (5/2 to end 5/8) for severe aspiration pneumonitis, restarted 5/8  Cultures:  - MRSA negative  - sputum culture Numerous Klebsiella pneumoniae and Normal Respiratory Jennifer present prelim, gram stain final with few gpc, few gnr  - (5/7): Tracheal aspirate culture: negative  - pending repeat sputum cx  - no longer trending procal    ENDOCRINOLOGY:  - POCT fingersticks q6 hours while NPO  - Glucose goal 140-180    MSK:  Activity - Bedrest (05-05-24 @ 07:34) [Active]    LINES/DRAINS:  PIVs, bilateral cephalic midlines (5/1), LLQ drain (5/1), R radial arterial line (5/5 - 5/9), left radial A line placed 5/9 bowden (5/5 -     ADVANCED DIRECTIVES:  Full Code    HCP/Emergency Contact-    INDICATION FOR SICU: hemodynamic instability requiring vasopressor support    DISPO:  SICU

## 2024-05-11 LAB
GAS PNL BLDA: SIGNIFICANT CHANGE UP
GAS PNL BLDA: SIGNIFICANT CHANGE UP
GLUCOSE BLDC GLUCOMTR-MCNC: 103 MG/DL — HIGH (ref 70–99)
GLUCOSE BLDC GLUCOMTR-MCNC: 105 MG/DL — HIGH (ref 70–99)
GLUCOSE BLDC GLUCOMTR-MCNC: 98 MG/DL — SIGNIFICANT CHANGE UP (ref 70–99)
GRAM STN FLD: ABNORMAL
SPECIMEN SOURCE: SIGNIFICANT CHANGE UP

## 2024-05-11 PROCEDURE — 99291 CRITICAL CARE FIRST HOUR: CPT | Mod: 24,25

## 2024-05-11 PROCEDURE — 99233 SBSQ HOSP IP/OBS HIGH 50: CPT | Mod: 24

## 2024-05-11 PROCEDURE — 71045 X-RAY EXAM CHEST 1 VIEW: CPT | Mod: 26

## 2024-05-11 RX ORDER — SODIUM CHLORIDE 9 MG/ML
1000 INJECTION, SOLUTION INTRAVENOUS
Refills: 0 | Status: DISCONTINUED | OUTPATIENT
Start: 2024-05-11 | End: 2024-05-11

## 2024-05-11 RX ORDER — SODIUM CHLORIDE 9 MG/ML
1000 INJECTION, SOLUTION INTRAVENOUS
Refills: 0 | Status: DISCONTINUED | OUTPATIENT
Start: 2024-05-11 | End: 2024-05-12

## 2024-05-11 RX ORDER — ACETAZOLAMIDE 250 MG/1
125 TABLET ORAL EVERY 24 HOURS
Refills: 0 | Status: DISCONTINUED | OUTPATIENT
Start: 2024-05-11 | End: 2024-05-13

## 2024-05-11 RX ORDER — PANTOPRAZOLE SODIUM 20 MG/1
40 TABLET, DELAYED RELEASE ORAL DAILY
Refills: 0 | Status: DISCONTINUED | OUTPATIENT
Start: 2024-05-11 | End: 2024-05-15

## 2024-05-11 RX ADMIN — ACETAZOLAMIDE 125 MILLIGRAM(S): 250 TABLET ORAL at 18:47

## 2024-05-11 RX ADMIN — PIPERACILLIN AND TAZOBACTAM 25 GRAM(S): 4; .5 INJECTION, POWDER, LYOPHILIZED, FOR SOLUTION INTRAVENOUS at 12:31

## 2024-05-11 RX ADMIN — Medication 650 MILLIGRAM(S): at 05:41

## 2024-05-11 RX ADMIN — Medication 1 DROP(S): at 18:00

## 2024-05-11 RX ADMIN — ENOXAPARIN SODIUM 40 MILLIGRAM(S): 100 INJECTION SUBCUTANEOUS at 05:41

## 2024-05-11 RX ADMIN — Medication 650 MILLIGRAM(S): at 18:33

## 2024-05-11 RX ADMIN — PANTOPRAZOLE SODIUM 40 MILLIGRAM(S): 20 TABLET, DELAYED RELEASE ORAL at 18:47

## 2024-05-11 RX ADMIN — Medication 1 DROP(S): at 05:42

## 2024-05-11 RX ADMIN — Medication 650 MILLIGRAM(S): at 18:18

## 2024-05-11 RX ADMIN — ENOXAPARIN SODIUM 40 MILLIGRAM(S): 100 INJECTION SUBCUTANEOUS at 18:18

## 2024-05-11 RX ADMIN — SODIUM CHLORIDE 75 MILLILITER(S): 9 INJECTION, SOLUTION INTRAVENOUS at 09:46

## 2024-05-11 RX ADMIN — Medication 650 MILLIGRAM(S): at 13:01

## 2024-05-11 RX ADMIN — Medication 650 MILLIGRAM(S): at 06:11

## 2024-05-11 RX ADMIN — HYDROMORPHONE HYDROCHLORIDE 0.2 MILLIGRAM(S): 2 INJECTION INTRAMUSCULAR; INTRAVENOUS; SUBCUTANEOUS at 09:59

## 2024-05-11 RX ADMIN — CHLORHEXIDINE GLUCONATE 1 APPLICATION(S): 213 SOLUTION TOPICAL at 05:53

## 2024-05-11 RX ADMIN — HYDROMORPHONE HYDROCHLORIDE 0.2 MILLIGRAM(S): 2 INJECTION INTRAMUSCULAR; INTRAVENOUS; SUBCUTANEOUS at 09:44

## 2024-05-11 RX ADMIN — PIPERACILLIN AND TAZOBACTAM 25 GRAM(S): 4; .5 INJECTION, POWDER, LYOPHILIZED, FOR SOLUTION INTRAVENOUS at 05:41

## 2024-05-11 RX ADMIN — PIPERACILLIN AND TAZOBACTAM 25 GRAM(S): 4; .5 INJECTION, POWDER, LYOPHILIZED, FOR SOLUTION INTRAVENOUS at 21:14

## 2024-05-11 RX ADMIN — Medication 650 MILLIGRAM(S): at 12:31

## 2024-05-11 NOTE — PROGRESS NOTE ADULT - ASSESSMENT
ASSESSMENT:  SARAH BERGMAN is a 58y F w/ PMHx of  obesity (BMI 43), GERD, RAFA (CPAP), HTN, ASTHMA admitted for elective gastric bypass and hiatal hernia repair, POD 2, Hosp day 3. Now is POD 0 for RTOR for diagnostic lap with JJ anastomosis revision, JASON, and upper endoscopic release of SBO.    PLAN:   - monitor CECE drain output quality and quantity  - S/p NJ tube removal, continue bariatric clear liquids, monitor tolerance  - Continue hemodynamic monitoring   - S/p extubation, monitor respiratory status and oxygen saturations, wean high-flow nasal cannula as tolerated  - Off pressors  - Daily AM CXR and ABG  - F/U repeat sputum culture results  - Continue with Zosyn 4.5g q8  - DVT and GI ppx  - Multi-modal pain control  - Rest of management per SICU    [phone]    [phone]

## 2024-05-11 NOTE — PROGRESS NOTE ADULT - SUBJECTIVE AND OBJECTIVE BOX
SARAH BERGMAN   [id number]   [birth year]  58yF    ============================   SICU Consult for hemodynamic instability requiring vasopressor support    HPI   59 y/o female BMI 42.4 PMH HTN, RAFA (not on home CPAP/BiPAP) GERD, pseudogout, migraines, renal stones, depression, asthma; PSH bladder repair, cholecystectomy. On 4/29/24, patient underwent an elective robotic-assisted hiatal herniorraphy w/ gastric bypass. On 4/30, patient was having abdominal pain and vomiting with a concern for possible aspiration. Patient underwent CT abdomen and pelvis showed a narrowed JJ anastomosis. On 4/31, patient was taken back to the OR for a diagnostic laparoscopy and found a narrowed jejunojejunal anastomosis, released and revised w/ negative leak test.    This morning, patient was found to by hypotensive and tachycardic on 4C by blue team. SICU consulted for resuscitation and hemodynamic instabililty. Patient was seen bedside on 4C. Patient is a/o x3, systolic BP in the 80s (improving to the 90s with fluid boluses), -110 sinus rhythm, SpO2 99% on NRFM at 15 LPM. Patient reports lower back pain and lower abdominal pain. Patient denies chest pain, dyspnea (although full inspiratory effort is limited by abdominal pain), nausea, vomiting, lightheadedness, dizziness.    Blue team bedside and says that current abdominal exam is her baseline and has not changed since post-op.      24 Hour Events  5/10  NIGHT  -PM rounds: desaturated to 78% on 5L NC, placed on NRB and improved to 90s. Placing on CPAP 40%/5 during sleep, transitioned to HF, abdomen soft, non-distended, CECE minimal serosangunious, pt unsure if passing gas   -15Naphos  -F/U UOP after diuresis   -AM CXR  -AM ABG   -F/U sputum CX      DAY   - AM ABG on PEEP of 6 then SBT with all parameters  - Levo off since 8am  - Acetazolamide 125 IV x1, started daily dose PO  - f/u 4pm BMP  - Repeat sputum cultures  - RSBI 91, NIF 40, (+) cuff leak, abg 7.57/32/159/29 --> extubated to 6L nasal cannula  - post extubation ABG --> 7.42 / 47 / 86 / 30 / Lactate 0.9  - started APAP PO ATC  - NGT removed per primary team  - advanced diet to joaquin clears  - f/u CXR tomorrow        [] A ten-point review of systems was otherwise negative except as noted above.  [ x ] Due to altered mental status/intubation, subjective information was not attained from the patient. History was obtained, to the extent possible, from review of the chart and collateral sources of information.  ==================================================================   SARAH BERGMAN   [id number]   [birth year]  58yF    ============================   SICU Consult for hemodynamic instability requiring vasopressor support    HPI   59 y/o female BMI 42.4 PMH HTN, RAFA (not on home CPAP/BiPAP) GERD, pseudogout, migraines, renal stones, depression, asthma; PSH bladder repair, cholecystectomy. On 4/29/24, patient underwent an elective robotic-assisted hiatal herniorraphy w/ gastric bypass. On 4/30, patient was having abdominal pain and vomiting with a concern for possible aspiration. Patient underwent CT abdomen and pelvis showed a narrowed JJ anastomosis. On 4/31, patient was taken back to the OR for a diagnostic laparoscopy and found a narrowed jejunojejunal anastomosis, released and revised w/ negative leak test.    This morning, patient was found to by hypotensive and tachycardic on 4C by blue team. SICU consulted for resuscitation and hemodynamic instabililty. Patient was seen bedside on 4C. Patient is a/o x3, systolic BP in the 80s (improving to the 90s with fluid boluses), -110 sinus rhythm, SpO2 99% on NRFM at 15 LPM. Patient reports lower back pain and lower abdominal pain. Patient denies chest pain, dyspnea (although full inspiratory effort is limited by abdominal pain), nausea, vomiting, lightheadedness, dizziness.    Blue team bedside and says that current abdominal exam is her baseline and has not changed since post-op.      24 Hour Events  5/10  NIGHT  -PM rounds: desaturated to 78% on 5L NC, placed on NRB and improved to 90s. Placing on CPAP 40%/5 during sleep, transitioned to HF, abdomen soft, non-distended, CECE minimal serosangunious, pt unsure if passing gas   -15Naphos  -F/U UOP after diuresis   -AM CXR  -AM ABG   -F/U sputum CX      DAY   - AM ABG on PEEP of 6 then SBT with all parameters  - Levo off since 8am  - Acetazolamide 125 IV x1, started daily dose PO  - f/u 4pm BMP  - Repeat sputum cultures  - RSBI 91, NIF 40, (+) cuff leak, abg 7.57/32/159/29 --> extubated to 6L nasal cannula  - post extubation ABG --> 7.42 / 47 / 86 / 30 / Lactate 0.9  - started APAP PO ATC  - NGT removed per primary team  - advanced diet to joaquin clears  - f/u CXR tomorrow        [] A ten-point review of systems was otherwise negative except as noted above.  [ x ] Due to altered mental status/intubation, subjective information was not attained from the patient. History was obtained, to the extent possible, from review of the chart and collateral sources of information.  ==================================================================  Daily     Daily     Diet, Clear Liquid:   Bariatric Clear Liquid (BARICLLIQ) (05-10-24 @ 16:24)      CURRENT MEDS:  Neurologic Medications  acetaminophen   Oral Liquid .. 650 milliGRAM(s) Oral every 6 hours  HYDROmorphone  Injectable 0.2 milliGRAM(s) IV Push every 12 hours PRN Moderate Pain (4 - 6)  HYDROmorphone  Injectable 0.5 milliGRAM(s) IV Push every 4 hours PRN Severe Pain (7 - 10)  ondansetron Injectable 4 milliGRAM(s) IV Push every 8 hours PRN Nausea and/or Vomiting    Respiratory Medications  albuterol    90 MICROgram(s) HFA Inhaler 2 Puff(s) Inhalation every 6 hours PRN Shortness of Breath and/or Wheezing    Cardiovascular Medications  acetaZOLAMIDE    Tablet 125 milliGRAM(s) Oral every 24 hours    Gastrointestinal Medications  pantoprazole   Suspension 40 milliGRAM(s) Oral every 24 hours    Genitourinary Medications    Hematologic/Oncologic Medications  enoxaparin Injectable 40 milliGRAM(s) SubCutaneous every 12 hours    Antimicrobial/Immunologic Medications  piperacillin/tazobactam IVPB.. 4.5 Gram(s) IV Intermittent every 8 hours    Endocrine/Metabolic Medications    Topical/Other Medications  artificial  tears Solution 1 Drop(s) Both EYES two times a day  chlorhexidine 2% Cloths 1 Application(s) Topical <User Schedule>      ICU Vital Signs Last 24 Hrs  T(C): 36.9 (11 May 2024 08:00), Max: 37.3 (10 May 2024 12:00)  T(F): 98.4 (11 May 2024 08:00), Max: 99.1 (10 May 2024 12:00)  HR: 89 (11 May 2024 07:00) (89 - 112)  BP: 113/57 (11 May 2024 07:00) (101/61 - 141/58)  BP(mean): 81 (11 May 2024 07:00) (72 - 83)  ABP: 85/66 (11 May 2024 07:00) (82/59 - 158/147)  ABP(mean): 75 (11 May 2024 07:00) (68 - 150)  RR: 22 (11 May 2024 07:00) (21 - 34)  SpO2: 99% (11 May 2024 07:00) (80% - 100%)    O2 Parameters below as of 11 May 2024 07:00  Patient On (Oxygen Delivery Method): nasal cannula, high flow  O2 Flow (L/min): 40  O2 Concentration (%): 50        Mode: CPAP with PS  FiO2: 50  PEEP: 8  PS: 5  ITime: 0.75  MAP: 12  PIP: 18    ABG - ( 10 May 2024 15:17 )  pH, Arterial: 7.42  pH, Blood: x     /  pCO2: 47    /  pO2: 86    / HCO3: 30    / Base Excess: 5.1   /  SaO2: 99.4                I&O's Summary    10 May 2024 07:01  -  11 May 2024 07:00  --------------------------------------------------------  IN: 1253 mL / OUT: 3025 mL / NET: -1772 mL    11 May 2024 07:01  -  11 May 2024 08:04  --------------------------------------------------------  IN: 0 mL / OUT: 100 mL / NET: -100 mL      I&O's Detail    10 May 2024 07:01  -  11 May 2024 07:00  --------------------------------------------------------  IN:    IV PiggyBack: 378 mL    IV PiggyBack: 275 mL    Oral Fluid: 600 mL  Total IN: 1253 mL    OUT:    Bulb (mL): 90 mL    FentaNYL: 0 mL    Indwelling Catheter - Urethral (mL): 2935 mL    Norepinephrine: 0 mL    Propofol: 0 mL  Total OUT: 3025 mL    Total NET: -1772 mL      11 May 2024 07:01  -  11 May 2024 08:04  --------------------------------------------------------  IN:  Total IN: 0 mL    OUT:    Indwelling Catheter - Urethral (mL): 100 mL  Total OUT: 100 mL    Total NET: -100 mL          05-10-24 @ 07:01  -  05-11-24 @ 07:00  --------------------------------------------------------  IN: 0 mL/kg/d / OUT: 26.21 mL/kg/d / NET: -26.21 mL/kg/d    05-11-24 @ 07:01  -  05-11-24 @ 08:04  --------------------------------------------------------  IN: 0 mL/kg/d / OUT: 0.89 mL/kg/d / NET: -0.89 mL/kg/d        PHYSICAL EXAM:    General: Pt lying comfortably in bed.      Neuro: Alert & oriented x 3, no focal deficits. CNs II-XII grossly intact. Spontaneously moving all extremities    Lungs: Rhonchi noted on right side, mildly increased expansion/effort. Oxygen delivery: HFNC 50%/40L.    Cardiovascular: S1, S2.  Regular rate and rhythm. Cardiac Rhythm: NSR    GI: Abdomen soft, Non-tender, mildly distended. Surgical incisions c/d/i, secured with Dermabond.     Extremities: Extremities warm, pink, well-perfused.     Derm: Good skin turgor, no skin breakdown.     : Nunez catheter in place    CXR: pending    LABS:  CAPILLARY BLOOD GLUCOSE      POCT Blood Glucose.: 105 mg/dL (10 May 2024 11:05)                          10.2   15.67 )-----------( 303      ( 10 May 2024 22:13 )             34.1       05-10    141  |  104  |  6<L>  ----------------------------<  102<H>  4.0   |  28  |  0.5<L>    Ca    8.5      10 May 2024 22:13  Phos  2.9     05-10  Mg     2.2     05-10              Urinalysis Basic - ( 10 May 2024 22:13 )    Color: x / Appearance: x / SG: x / pH: x  Gluc: 102 mg/dL / Ketone: x  / Bili: x / Urobili: x   Blood: x / Protein: x / Nitrite: x   Leuk Esterase: x / RBC: x / WBC x   Sq Epi: x / Non Sq Epi: x / Bacteria: x        Culture - Sputum (collected 10 May 2024 11:40)  Source: Trach Asp Tracheal Aspirate  Gram Stain (11 May 2024 03:40):    No polymorphonuclear leukocytes per low power field    Moderate Squamous epithelial cells per low power field    Few Gram positive cocci in pairs seen per oil power field    Rare Gram Positive Rods seen per oil power field

## 2024-05-11 NOTE — PROGRESS NOTE ADULT - ASSESSMENT
Assessment and Plan:	  58y Female s/p robotic-assisted hiatal herniorraphy w/ gastric bypass (4/29), diagnostic laparoscopy with released and revised JJ anastomosis narrowing w/ negative leak test (4/30).    NEUROLOGICAL:  #Acute pain  - acetaminophen   Oral Liquid .. 650 milliGRAM(s) Oral every 6 hours  - HYDROmorphone  Injectable 0.2 milliGRAM(s) IV Push every 12 hours PRN Moderate Pain (4 - 6)  - HYDROmorphone  Injectable 0.5 milliGRAM(s) IV Push every 4 hours PRN Severe Pain (7 - 10)  #Depression  - HOLDING home duloxetine  - HOLDING home wellbutrin  - HOLDING home amitriptyline    RESPIRATORY:   #Mechanical ventilation secondary to aspiration pneumonia with hypoxic respiratory failure - resolving  - intubated 5/5 - 5/10, extubated after parameters were met  - NC 6L during day   - 5/10 PM: Acute desaturation to SpO2 78% on 5L NC -> CPAP 40%/5 > transitioned to HFNC 40L/40%  - sputum culture pending    CARDIOVASCULAR:   #Acute hypotension secondary to sedation requirement - resolving  - levophed gtt currently off  #HTN  - HOLDING home antihypertensives (losartan, spironolactone)  - Elevated troponemia --> downtrended, no longer trending  - cardiology recs appreciated-->pending cards eval for echo with hyperdynamic HF and severe pulm HTN  ECHO (5/6): EF 70%/G1DD/TR/severe pulmonary HTN    GASTROINTESTINAL/NUTRITION:   #s/p robotic-assisted hiatal herniorraphy w/ gastric bypass (4/29),   # s/p diagnostic laparoscopy with released and revised JJ anastomosis narrowing w/ negative leak test (4/30)  - Diet: Hari clears  - LLQ CECE drain with serosanguinous output   - NG tube - d/c'd 5/10  - aspiration precautions  - head of bed 30 degrees  #GI Prophylaxis  - protonix suspension  #Bowel regimen  - Last BM 5/8    /RENAL:   #urine output in critically ill  - bowden placed 5/5  #intermittent diuresis  - 5/10: azetazolamide 125mg IV x 1   - PO azetazolamide 125 PO daily started for severe pHTN  Monitor UO-bowden in place    Labs:          BUN/Cr- 7/<0.5  -->,  6/0.5  -->          Electrolytes-(05-10 @ 22:13)Na 141 // K 4.0 // Mg 2.2 // Phos 2.9     #KIKA - resolved  - Non-oliguric    HEME/ONC:   #DVT prophylaxis  - Lovenox 40 q12  - SCDs      Labs: Hb/Hct:  9.7/31.9  -->,  10.2/34.1  -->                      Plts:  349  -->,  303  -->                 PTT/INR:        ID:   WBC- 15.87  --->>,  16.38  --->>,  15.67  --->>  Temp trend- 24hrs T(F): 98.4 (05-10 @ 20:00), Max: 99.1 (05-10 @ 12:00)  Current antibiotics- piperacillin/tazobactam IVPB.. 3.375 every 8 hours (5/2 to end 5/8) for severe aspiration pneumonitis, restarted 5/8  Cultures:  - MRSA negative  - sputum culture Numerous Klebsiella pneumoniae and Normal Respiratory Jennifer present prelim, gram stain final with few gpc, few gnr  - (5/7): Tracheal aspirate culture: negative  - pending repeat sputum cx  - no longer trending procal    ENDOCRINOLOGY:  - Glucose goal 140-180    MSK:  Activity - increased as tolerated    LINES/DRAINS:  PIVs, bilateral cephalic midlines (5/1), LLQ drain (5/1), R radial arterial line (5/5 - 5/9), left radial A line placed 5/9 bowden (5/5 -     ADVANCED DIRECTIVES:  Full Code    HCP/Emergency Contact-    INDICATION FOR SICU: hemodynamic instability requiring vasopressor support    DISPO:  SICU

## 2024-05-11 NOTE — PROGRESS NOTE ADULT - SUBJECTIVE AND OBJECTIVE BOX
GENERAL SURGERY PROGRESS NOTE     SARAH BERGMAN  58y  Female  Hospital day :13d  POD:12d  Procedure: Robot-assisted hiatal herniorrhaphy using da Madhuri Xi    Creation, bypass, gastric, laparoscopic, robot-assisted    Diagnostic laparoscopy    Revision of anastomosis of small intestine    Laparoscopic lysis of abdominal adhesions    Upper endoscopy    Release of small bowel obstruction    Midline catheter insertion    Arterial cannulation, percutaneous, for monitoring    Ultrasound guided venous access    OVERNIGHT EVENTS:  - Off pressors  - NC 5L -> SaO2 78% -> Non-rebreather mask -> SaO2 90% -> CPAP -> High-flow nasal cannula  - CECE drain in place with serosanguinous output  - 15 Naphos    T(F): 98.2 (24 @ 00:00), Max: 99.1 (05-10-24 @ 12:00)  HR: 103 (24 01:) (71 - 112)  BP: 141/58 (24 00:00) (101/61 - 145/65)  ABP: 151/70 (24:00) (82/59 - 171/69)  ABP(mean): 99 (24:) (64 - 150)  RR: 23 (24:) (21 - 37)  SpO2: 99% (24 01:) (80% - 100%)    DIET/FLUIDS:   N24 @ 07:01  -  05-10-24 @ 07:00  --------------------------------------------------------  OUT: 500 mL                                                                                 DRAINS:   24 07:01  -  05-10-24 @ 07:00  --------------------------------------------------------  OUT: 65 mL      BM:   24 @ 07:01  -  05-10-24 @ 07:00  --------------------------------------------------------  OUT: 200 mL      URINE:   24 @ 07:01  -  05-10-24 @ 07:00  --------------------------------------------------------  OUT: 1305 mL     Indwelling Urethral Catheter:     Connect To:  Straight Drainage/Gravity    Indication:  Urine Output Monitoring in Critically Ill (05-10-24 @ 09:15)    GI proph:  pantoprazole   Suspension 40 milliGRAM(s) Oral every 24 hours    AC/ proph: enoxaparin Injectable 40 milliGRAM(s) SubCutaneous every 12 hours    ABx: piperacillin/tazobactam IVPB.. 4.5 Gram(s) IV Intermittent every 8 hours      PHYSICAL EXAM:  GENERAL: NAD  CHEST/LUNG: On high-flow nasal cannula  HEART: Regular rate and rhythm  ABDOMEN: Soft, Nontender, Nondistended; CECE drain in place      LABS  Labs:  CAPILLARY BLOOD GLUCOSE  POCT Blood Glucose.: 105 mg/dL (10 May 2024 11:05)  POCT Blood Glucose.: 112 mg/dL (10 May 2024 06:41)                          10.2   15.67 )-----------( 303      ( 10 May 2024 22:13 )             34.1         05-10    141  |  104  |  6<L>  ----------------------------<  102<H>  4.0   |  28  |  0.5<L>      Calcium: 8.5 mg/dL (05-10-24 @ 22:13)      LFTs:     Blood Gas Arterial, Lactate: 0.9 mmol/L (05-10-24 @ 15:17)  Blood Gas Arterial, Lactate: 1.4 mmol/L (05-10-24 @ 09:29)  Blood Gas Arterial, Lactate: 0.9 mmol/L (05-10-24 @ 03:34)  Blood Gas Arterial, Lactate: 1.0 mmol/L (24 @ 21:04)  Blood Gas Arterial, Lactate: 1.1 mmol/L (24 @ 16:10)  Blood Gas Arterial, Lactate: 1.0 mmol/L (24 @ 03:40)  Blood Gas Arterial, Lactate: 1.2 mmol/L (24 @ 18:46)  Blood Gas Arterial, Lactate: 1.0 mmol/L (24 @ 05:18)    ABG - ( 10 May 2024 15:17 )  pH: 7.42  /  pCO2: 47    /  pO2: 86    / HCO3: 30    / Base Excess: 5.1   /  SaO2: 99.4            ABG - ( 10 May 2024 09:29 )  pH: 7.57  /  pCO2: 32    /  pO2: 159   / HCO3: 29    / Base Excess: 7.1   /  SaO2: 99.8            ABG - ( 10 May 2024 03:34 )  pH: 7.40  /  pCO2: 49    /  pO2: 91    / HCO3: 30    / Base Excess: 4.8   /  SaO2: 98.9          Urinalysis Basic - ( 10 May 2024 22:13 )    Color: x / Appearance: x / SG: x / pH: x  Gluc: 102 mg/dL / Ketone: x  / Bili: x / Urobili: x   Blood: x / Protein: x / Nitrite: x   Leuk Esterase: x / RBC: x / WBC x   Sq Epi: x / Non Sq Epi: x / Bacteria: x            RADIOLOGY & ADDITIONAL TESTS:  < from: Xray Chest 1 View- PORTABLE-Routine (Xray Chest 1 View- PORTABLE-Routine in AM.) (05.10.24 @ 06:50) >  Impression:  Bilateral opacities. Support devices as described. Stable.

## 2024-05-12 LAB
ANION GAP SERPL CALC-SCNC: 7 MMOL/L — SIGNIFICANT CHANGE UP (ref 7–14)
BUN SERPL-MCNC: 7 MG/DL — LOW (ref 10–20)
CALCIUM SERPL-MCNC: 8.4 MG/DL — SIGNIFICANT CHANGE UP (ref 8.4–10.5)
CHLORIDE SERPL-SCNC: 103 MMOL/L — SIGNIFICANT CHANGE UP (ref 98–110)
CO2 SERPL-SCNC: 28 MMOL/L — SIGNIFICANT CHANGE UP (ref 17–32)
CREAT SERPL-MCNC: 0.5 MG/DL — LOW (ref 0.7–1.5)
CULTURE RESULTS: ABNORMAL
CULTURE RESULTS: ABNORMAL
EGFR: 109 ML/MIN/1.73M2 — SIGNIFICANT CHANGE UP
GLUCOSE BLDC GLUCOMTR-MCNC: 116 MG/DL — HIGH (ref 70–99)
GLUCOSE BLDC GLUCOMTR-MCNC: 119 MG/DL — HIGH (ref 70–99)
GLUCOSE SERPL-MCNC: 113 MG/DL — HIGH (ref 70–99)
HCT VFR BLD CALC: 32.8 % — LOW (ref 37–47)
HGB BLD-MCNC: 9.8 G/DL — LOW (ref 12–16)
MAGNESIUM SERPL-MCNC: 2 MG/DL — SIGNIFICANT CHANGE UP (ref 1.8–2.4)
MCHC RBC-ENTMCNC: 26.3 PG — LOW (ref 27–31)
MCHC RBC-ENTMCNC: 29.9 G/DL — LOW (ref 32–37)
MCV RBC AUTO: 87.9 FL — SIGNIFICANT CHANGE UP (ref 81–99)
NRBC # BLD: 0 /100 WBCS — SIGNIFICANT CHANGE UP (ref 0–0)
PHOSPHATE SERPL-MCNC: 2.5 MG/DL — SIGNIFICANT CHANGE UP (ref 2.1–4.9)
PLATELET # BLD AUTO: 296 K/UL — SIGNIFICANT CHANGE UP (ref 130–400)
PMV BLD: 10.1 FL — SIGNIFICANT CHANGE UP (ref 7.4–10.4)
POTASSIUM SERPL-MCNC: 3.9 MMOL/L — SIGNIFICANT CHANGE UP (ref 3.5–5)
POTASSIUM SERPL-SCNC: 3.9 MMOL/L — SIGNIFICANT CHANGE UP (ref 3.5–5)
RBC # BLD: 3.73 M/UL — LOW (ref 4.2–5.4)
RBC # FLD: 15.4 % — HIGH (ref 11.5–14.5)
SODIUM SERPL-SCNC: 138 MMOL/L — SIGNIFICANT CHANGE UP (ref 135–146)
SPECIMEN SOURCE: SIGNIFICANT CHANGE UP
SPECIMEN SOURCE: SIGNIFICANT CHANGE UP
WBC # BLD: 13.08 K/UL — HIGH (ref 4.8–10.8)
WBC # FLD AUTO: 13.08 K/UL — HIGH (ref 4.8–10.8)

## 2024-05-12 PROCEDURE — 71045 X-RAY EXAM CHEST 1 VIEW: CPT | Mod: 26

## 2024-05-12 PROCEDURE — 99291 CRITICAL CARE FIRST HOUR: CPT | Mod: 24,25

## 2024-05-12 PROCEDURE — 99233 SBSQ HOSP IP/OBS HIGH 50: CPT | Mod: 24

## 2024-05-12 RX ORDER — LISINOPRIL 2.5 MG/1
10 TABLET ORAL ONCE
Refills: 0 | Status: DISCONTINUED | OUTPATIENT
Start: 2024-05-12 | End: 2024-05-12

## 2024-05-12 RX ORDER — SODIUM CHLORIDE 9 MG/ML
1000 INJECTION, SOLUTION INTRAVENOUS
Refills: 0 | Status: DISCONTINUED | OUTPATIENT
Start: 2024-05-12 | End: 2024-05-13

## 2024-05-12 RX ORDER — LISINOPRIL 2.5 MG/1
10 TABLET ORAL DAILY
Refills: 0 | Status: DISCONTINUED | OUTPATIENT
Start: 2024-05-12 | End: 2024-05-12

## 2024-05-12 RX ADMIN — Medication 600 MILLIGRAM(S): at 23:09

## 2024-05-12 RX ADMIN — Medication 650 MILLIGRAM(S): at 23:09

## 2024-05-12 RX ADMIN — ONDANSETRON 4 MILLIGRAM(S): 8 TABLET, FILM COATED ORAL at 16:30

## 2024-05-12 RX ADMIN — Medication 650 MILLIGRAM(S): at 23:39

## 2024-05-12 RX ADMIN — Medication 85 MILLIMOLE(S): at 05:37

## 2024-05-12 RX ADMIN — PIPERACILLIN AND TAZOBACTAM 25 GRAM(S): 4; .5 INJECTION, POWDER, LYOPHILIZED, FOR SOLUTION INTRAVENOUS at 05:37

## 2024-05-12 RX ADMIN — CHLORHEXIDINE GLUCONATE 1 APPLICATION(S): 213 SOLUTION TOPICAL at 05:37

## 2024-05-12 RX ADMIN — ACETAZOLAMIDE 125 MILLIGRAM(S): 250 TABLET ORAL at 18:48

## 2024-05-12 RX ADMIN — SODIUM CHLORIDE 50 MILLILITER(S): 9 INJECTION, SOLUTION INTRAVENOUS at 11:08

## 2024-05-12 RX ADMIN — Medication 1 DROP(S): at 06:20

## 2024-05-12 RX ADMIN — PANTOPRAZOLE SODIUM 40 MILLIGRAM(S): 20 TABLET, DELAYED RELEASE ORAL at 12:09

## 2024-05-12 RX ADMIN — PIPERACILLIN AND TAZOBACTAM 25 GRAM(S): 4; .5 INJECTION, POWDER, LYOPHILIZED, FOR SOLUTION INTRAVENOUS at 12:10

## 2024-05-12 RX ADMIN — Medication 1 DROP(S): at 18:49

## 2024-05-12 RX ADMIN — ENOXAPARIN SODIUM 40 MILLIGRAM(S): 100 INJECTION SUBCUTANEOUS at 18:48

## 2024-05-12 RX ADMIN — SODIUM CHLORIDE 50 MILLILITER(S): 9 INJECTION, SOLUTION INTRAVENOUS at 12:10

## 2024-05-12 RX ADMIN — ENOXAPARIN SODIUM 40 MILLIGRAM(S): 100 INJECTION SUBCUTANEOUS at 05:37

## 2024-05-12 RX ADMIN — PIPERACILLIN AND TAZOBACTAM 25 GRAM(S): 4; .5 INJECTION, POWDER, LYOPHILIZED, FOR SOLUTION INTRAVENOUS at 21:24

## 2024-05-12 NOTE — PROGRESS NOTE ADULT - SUBJECTIVE AND OBJECTIVE BOX
SARAH BERGMAN   [id number]   [birth year]  58yF    ============================   SICU Consult for hemodynamic instability requiring vasopressor support    HPI   57 y/o female BMI 42.4 PMH HTN, RAFA (not on home CPAP/BiPAP) GERD, pseudogout, migraines, renal stones, depression, asthma; PSH bladder repair, cholecystectomy. On 4/29/24, patient underwent an elective robotic-assisted hiatal herniorraphy w/ gastric bypass. On 4/30, patient was having abdominal pain and vomiting with a concern for possible aspiration. Patient underwent CT abdomen and pelvis showed a narrowed JJ anastomosis. On 4/31, patient was taken back to the OR for a diagnostic laparoscopy and found a narrowed jejunojejunal anastomosis, released and revised w/ negative leak test.    This morning, patient was found to by hypotensive and tachycardic on 4C by blue team. SICU consulted for resuscitation and hemodynamic instabililty. Patient was seen bedside on 4C. Patient is a/o x3, systolic BP in the 80s (improving to the 90s with fluid boluses), -110 sinus rhythm, SpO2 99% on NRFM at 15 LPM. Patient reports lower back pain and lower abdominal pain. Patient denies chest pain, dyspnea (although full inspiratory effort is limited by abdominal pain), nausea, vomiting, lightheadedness, dizziness.    Blue team bedside and says that current abdominal exam is her baseline and has not changed since post-op.      24 Hour Events  5/11  NIGHT  -PM rounds: abdomen soft, tender epigastric region, non-distended   - d/c bowden after diamox @ [place]  -ABG on 40L/40%: 7.51/34/105/27 Lac 1.0  -AM CXR      DAY  - add joaquin ensures  - d5 1/2NS @ 75cc  - Keep bowden x12 hrs, possible d/c at midnight  - OOBTC  - CECE d/c'd  - give diamox dose  - d/c rob  - ABG on HFNC 40/40: 7.41/45/73/28 Lac 0.7  - sputum cx neg        [] A ten-point review of systems was otherwise negative except as noted above.  [ x ] Due to altered mental status/intubation, subjective information was not attained from the patient. History was obtained, to the extent possible, from review of the chart and collateral sources of information.  ==================================================================   SARAH BERGMAN   [id number]   [birth year]  58yF    ============================   SICU Consult for hemodynamic instability requiring vasopressor support    HPI   57 y/o female BMI 42.4 PMH HTN, RAFA (not on home CPAP/BiPAP) GERD, pseudogout, migraines, renal stones, depression, asthma; PSH bladder repair, cholecystectomy. On 4/29/24, patient underwent an elective robotic-assisted hiatal herniorraphy w/ gastric bypass. On 4/30, patient was having abdominal pain and vomiting with a concern for possible aspiration. Patient underwent CT abdomen and pelvis showed a narrowed JJ anastomosis. On 4/31, patient was taken back to the OR for a diagnostic laparoscopy and found a narrowed jejunojejunal anastomosis, released and revised w/ negative leak test.    This morning, patient was found to by hypotensive and tachycardic on 4C by blue team. SICU consulted for resuscitation and hemodynamic instabililty. Patient was seen bedside on 4C. Patient is a/o x3, systolic BP in the 80s (improving to the 90s with fluid boluses), -110 sinus rhythm, SpO2 99% on NRFM at 15 LPM. Patient reports lower back pain and lower abdominal pain. Patient denies chest pain, dyspnea (although full inspiratory effort is limited by abdominal pain), nausea, vomiting, lightheadedness, dizziness.    Blue team bedside and says that current abdominal exam is her baseline and has not changed since post-op.      24 Hour Events  5/11  NIGHT  -PM rounds: abdomen soft, tender epigastric region, non-distended   -TOV at 9AM   -ABG on 40L/40%: 7.51/34/105/27 Lac 1.0   -AM CXR  -Naphos        DAY  - add joaquin ensures  - d5 1/2NS @ 75cc  - Keep bowden x12 hrs, possible d/c at midnight  - OOBTC  - CECE d/c'd  - give diamox dose  - d/c rob  - ABG on HFNC 40/40: 7.41/45/73/28 Lac 0.7  - sputum cx neg        [] A ten-point review of systems was otherwise negative except as noted above.  [ x ] Due to altered mental status/intubation, subjective information was not attained from the patient. History was obtained, to the extent possible, from review of the chart and collateral sources of information.  ==================================================================   SARAH BERGMAN   [id number]   [birth year]  58yF    ============================   SICU Consult for hemodynamic instability requiring vasopressor support    HPI   59 y/o female BMI 42.4 PMH HTN, RAFA (not on home CPAP/BiPAP) GERD, pseudogout, migraines, renal stones, depression, asthma; PSH bladder repair, cholecystectomy. On 4/29/24, patient underwent an elective robotic-assisted hiatal herniorraphy w/ gastric bypass. On 4/30, patient was having abdominal pain and vomiting with a concern for possible aspiration. Patient underwent CT abdomen and pelvis showed a narrowed JJ anastomosis. On 4/31, patient was taken back to the OR for a diagnostic laparoscopy and found a narrowed jejunojejunal anastomosis, released and revised w/ negative leak test.    This morning, patient was found to by hypotensive and tachycardic on 4C by blue team. SICU consulted for resuscitation and hemodynamic instabililty. Patient was seen bedside on 4C. Patient is a/o x3, systolic BP in the 80s (improving to the 90s with fluid boluses), -110 sinus rhythm, SpO2 99% on NRFM at 15 LPM. Patient reports lower back pain and lower abdominal pain. Patient denies chest pain, dyspnea (although full inspiratory effort is limited by abdominal pain), nausea, vomiting, lightheadedness, dizziness.    Blue team bedside and says that current abdominal exam is her baseline and has not changed since post-op.      24 Hour Events  5/11  NIGHT  -PM rounds: abdomen soft, tender epigastric region, non-distended   -TOV at 9AM   -ABG on 40L/40%: 7.51/34/105/27 Lac 1.0   -AM CXR  -Naphos        DAY  - add joaquin ensures  - d5 1/2NS @ 75cc  - Keep bowden x12 hrs, possible d/c at midnight  - OOBTC  - CECE d/c'd  - give diamox dose  - d/c rob  - ABG on HFNC 40/40: 7.41/45/73/28 Lac 0.7  - sputum cx neg    Physical Exam:  Neuro- alert and oriented x 3  Lungs- HFNC, fine crackles B/L, no wheezing  Cardiac- S1 S2, RRR, no murmur  Abd- soft, NT, ND, + bowel sounds  MSK- ROM x 4, distal pulses intact        [] A ten-point review of systems was otherwise negative except as noted above.  [ x ] Due to altered mental status/intubation, subjective information was not attained from the patient. History was obtained, to the extent possible, from review of the chart and collateral sources of information.  ==================================================================

## 2024-05-12 NOTE — PROGRESS NOTE ADULT - ASSESSMENT
58y Female s/p robotic-assisted hiatal herniorraphy w/ gastric bypass (4/29), diagnostic laparoscopy with released and revised JJ anastomosis narrowing w/ negative leak test (4/30).    NEUROLOGICAL:  #Acute pain  - acetaminophen   Oral Liquid .. 650 milliGRAM(s) Oral every 6 hours  - HYDROmorphone  Injectable 0.2 milliGRAM(s) IV Push every 12 hours PRN Moderate Pain (4 - 6)  - HYDROmorphone  Injectable 0.5 milliGRAM(s) IV Push every 4 hours PRN Severe Pain (7 - 10)  #Depression  - HOLDING home duloxetine  - HOLDING home wellbutrin  - HOLDING home amitriptyline    RESPIRATORY:   #Mechanical ventilation secondary to aspiration pneumonia with hypoxic respiratory failure - resolving  - intubated 5/5 - 5/10, extubated after parameters were met  - 5/10 PM: Acute desaturation to SpO2 78% on 5L NC -> CPAP 40%/5 > transitioned to HFNC 40L/40%  - ABG on HFNC 40/40: 7.51/34/105/27 Lac 1.0  - sputum culture prelim negative    CARDIOVASCULAR:   #Acute hypotension secondary to sedation requirement - resolved  - Levophed gtt currently off  #HTN  - HOLDING home antihypertensives (losartan, spironolactone)  - Elevated troponemia --> downtrended, no longer trending  - cardiology recs appreciated-->pending cards eval for echo with hyperdynamic HF and severe pulm HTN  ECHO (5/6): EF 70%/G1DD/TR/severe pulmonary HTN    GASTROINTESTINAL/NUTRITION:   #s/p robotic-assisted hiatal herniorraphy w/ gastric bypass (4/29),   # s/p diagnostic laparoscopy with released and revised JJ anastomosis narrowing w/ negative leak test (4/30)  - Diet: Hari clears with Hari ensures  - LLQ CECE drain d/c'd   - NG tube - d/c'd 5/10  - aspiration precautions  - head of bed 30 degrees  #GI Prophylaxis  - protonix PO  #Bowel regimen  - Last BM 5/8    /RENAL:   #urine output in critically ill  - bowden placed 5/5 > D/C'd 5/12 pending TOV 9AM   #IVF  - dD5 1/2 NS at 75mL/hr  #intermittent diuresis  - PO azetazolamide 125 PO daily started for severe pHTN  Monitor UO-bowden in place  #KIKA - resolved  - Non-oliguric

## 2024-05-12 NOTE — PROGRESS NOTE ADULT - SUBJECTIVE AND OBJECTIVE BOX
SUBJ:      MEDICATIONS  (STANDING):  acetaminophen   Oral Liquid .. 650 milliGRAM(s) Oral every 6 hours  acetaZOLAMIDE    Tablet 125 milliGRAM(s) Oral every 24 hours  artificial  tears Solution 1 Drop(s) Both EYES two times a day  chlorhexidine 2% Cloths 1 Application(s) Topical <User Schedule>  dextrose 5% + sodium chloride 0.45%. 1000 milliLiter(s) (75 mL/Hr) IV Continuous <Continuous>  enoxaparin Injectable 40 milliGRAM(s) SubCutaneous every 12 hours  pantoprazole    Tablet 40 milliGRAM(s) Oral daily  piperacillin/tazobactam IVPB.. 4.5 Gram(s) IV Intermittent every 8 hours    MEDICATIONS  (PRN):  albuterol    90 MICROgram(s) HFA Inhaler 2 Puff(s) Inhalation every 6 hours PRN Shortness of Breath and/or Wheezing  HYDROmorphone  Injectable 0.2 milliGRAM(s) IV Push every 12 hours PRN Moderate Pain (4 - 6)  HYDROmorphone  Injectable 0.5 milliGRAM(s) IV Push every 4 hours PRN Severe Pain (7 - 10)  ondansetron Injectable 4 milliGRAM(s) IV Push every 8 hours PRN Nausea and/or Vomiting            Vital Signs Last 24 Hrs  T(C): 36.8 (12 May 2024 08:00), Max: 37.2 (11 May 2024 12:00)  T(F): 98.3 (12 May 2024 08:00), Max: 99 (11 May 2024 12:00)  HR: 95 (12 May 2024 09:00) (84 - 95)  BP: 155/66 (12 May 2024 09:00) (103/58 - 161/70)  BP(mean): 95 (12 May 2024 09:00) (75 - 102)  RR: 27 (12 May 2024 09:00) (16 - 28)  SpO2: 98% (12 May 2024 09:00) (94% - 99%)    Parameters below as of 12 May 2024 08:00  Patient On (Oxygen Delivery Method): nasal cannula, high flow  O2 Flow (L/min): 40  O2 Concentration (%): 40     REVIEW OF SYSTEMS:  CONSTITUTIONAL: No fever, weight loss, or fatigue  CARDIOLOGY: PAtient denies chest pain, shortness of breath or syncopal episodes.   RESPIRATORY: denies shortness of breath, wheezeing.   NEUROLOGICAL: NO weakness, no focal deficits to report.  GI: no BRBPR, no N,V,diarrhea.    PSYCHIATRY: normal mood and affect  HEENT: no nasal discharge, no ecchymosis  SKIN: no ecchymosis, no breakdown  MUSCULOSKELETAL: Full range of motion x4.        PHYSICAL EXAM:  · CONSTITUTIONAL:	Well-developed, well nourished    BMI-  ·RESPIRATORY:   airway patent; breath sounds equal; good air movement; respirations non-labored; clear to auscultation bilaterally; no chest wall tenderness; no intercostal retractions; no rales,rhonchi or wheeze  · CARDIOVASCULAR	regular rate and rhythm  no rub  no murmur  normal PMI  · EXTREMITIES: No cyanosis, clubbing or edema  · VASCULAR: 	Equal and normal pulses (carotid, femoral, dorsalis pedis)  	  TELEMETRY:    ECG:    TTE:    LABS:                        9.8    13.08 )-----------( 296      ( 12 May 2024 01:01 )             32.8     05-12    138  |  103  |  7<L>  ----------------------------<  113<H>  3.9   |  28  |  0.5<L>    Ca    8.4      12 May 2024 01:01  Phos  2.5     05-12  Mg     2.0     05-12              I&O's Summary    11 May 2024 07:01  -  12 May 2024 07:00  --------------------------------------------------------  IN: 2066 mL / OUT: 2290 mL / NET: -224 mL      BNP  RADIOLOGY & ADDITIONAL STUDIES:    IMPRESSION AND PLAN:

## 2024-05-12 NOTE — PROGRESS NOTE ADULT - ASSESSMENT
58y F w/ PMHx of  HTN, RAFA (not on home CPAP/BiPAP), GERD, pseudogout, migraines, renal stones, h/o depression and anxiety, surgical history of bladder repair and cholecystectomy, who presented to [facility] on 4/29/2024 for elective robotic-assisted hiatal herniorrhaphy w/ gastric bypass. Post-operative course c/b abdominal pain and emesis. CT abdomen/pelvis obtained which showed a narrowed JJ anastomosis. On 5/1, patient was taken back to the OR for a diagnostic laparoscopy, release and revision of jejunojejunal anastomosis w/ negative leak test. Concern for aspiration intra-operatively, patient found to be hypotensive and tachycardic post-op. Admitted to SICU for resuscitation and pressor needs.     5/5 - Intubated, mechanically ventilated 2/2 aspiration pneumonia w/ hypoxic respiratory failure   5/10 - Extubated after parameters were met; acute desaturation to SpO2 78% on 5L NC --> CPAP 40%/5 --> transitioned to HFNC 40L/40%     PLAN:  - Continue Hari clears, monitor tolerance  - Daily AM CXR and ABG  - Monitor respiratory status and O2 saturations, wean off high-flow nasal cannula as tolerated   - F/u repeat sputum culture results  - Zosyn 4.5g q8h   - PPX: Lovenox 40 bid, PTX 40 qd   - Multi-modal pain control  - Rest of care per SICU   -------------------------------  General Surgery - Blue Team  [phone]

## 2024-05-12 NOTE — PROGRESS NOTE ADULT - ASSESSMENT
Assessment and Plan:	  58y Female s/p robotic-assisted hiatal herniorraphy w/ gastric bypass (4/29), diagnostic laparoscopy with released and revised JJ anastomosis narrowing w/ negative leak test (4/30).    NEUROLOGICAL:  #Acute pain  - acetaminophen   Oral Liquid .. 650 milliGRAM(s) Oral every 6 hours  - HYDROmorphone  Injectable 0.2 milliGRAM(s) IV Push every 12 hours PRN Moderate Pain (4 - 6)  - HYDROmorphone  Injectable 0.5 milliGRAM(s) IV Push every 4 hours PRN Severe Pain (7 - 10)  #Depression  - HOLDING home duloxetine  - HOLDING home wellbutrin  - HOLDING home amitriptyline    RESPIRATORY:   #Mechanical ventilation secondary to aspiration pneumonia with hypoxic respiratory failure - resolving  - intubated 5/5 - 5/10, extubated after parameters were met  - 5/10 PM: Acute desaturation to SpO2 78% on 5L NC -> CPAP 40%/5 > transitioned to HFNC 40L/40%  - ABG on HFNC 40/40: 7.51/34/105/27 Lac 1.0  - sputum culture prelim negative    CARDIOVASCULAR:   #Acute hypotension secondary to sedation requirement - resolved  - Levophed gtt currently off  #HTN  - HOLDING home antihypertensives (losartan, spironolactone)  - Elevated troponemia --> downtrended, no longer trending  - cardiology recs appreciated-->pending cards eval for echo with hyperdynamic HF and severe pulm HTN  ECHO (5/6): EF 70%/G1DD/TR/severe pulmonary HTN    GASTROINTESTINAL/NUTRITION:   #s/p robotic-assisted hiatal herniorraphy w/ gastric bypass (4/29),   # s/p diagnostic laparoscopy with released and revised JJ anastomosis narrowing w/ negative leak test (4/30)  - Diet: Hari clears with Hari ensures  - LLQ CECE drain d/c'd   - NG tube - d/c'd 5/10  - aspiration precautions  - head of bed 30 degrees  #GI Prophylaxis  - protonix PO  #Bowel regimen  - Last BM 5/8    /RENAL:   #urine output in critically ill  - bowden placed 5/5 > D/C 5/12 pending TOV 7AM   #IVF  - d5 1/2 NS at 75mL/hr  #intermittent diuresis  - PO azetazolamide 125 PO daily started for severe pHTN  Monitor UO-bowden in place    Labs:          BUN/Cr- 7/<0.5  -->,  6/0.5  -->          Electrolytes-(05-10 @ 22:13)Na 141 // K 4.0 // Mg 2.2 // Phos 2.9     #KIKA - resolved  - Non-oliguric    HEME/ONC:   #DVT prophylaxis  - Lovenox 40 q12  - SCDs      Labs: Hb/Hct:  9.7/31.9  -->,  10.2/34.1  -->                      Plts:  349  -->,  303  -->                 PTT/INR:        ID:   WBC- 15.87  --->>,  16.38  --->>,  15.67  --->>  Temp trend- 24hrs T(F): 98.4 (05-10 @ 20:00), Max: 99.1 (05-10 @ 12:00)  Current antibiotics- piperacillin/tazobactam IVPB.. 3.375 every 8 hours (5/2 to end 5/8) for severe aspiration pneumonitis, restarted 5/8  Cultures:  - MRSA negative  - sputum culture Numerous Klebsiella pneumoniae and Normal Respiratory Jennifer present prelim, gram stain final with few gpc, few gnr  - (5/7): Tracheal aspirate culture: negative  - repeat sputum cx- Few GPC in pairs, rare GPR  - no longer trending procal    ENDOCRINOLOGY:  - Glucose goal 140-180    MSK:  Activity - increased as tolerated    LINES/DRAINS:  PIVs, bilateral cephalic midlines (5/1), LLQ drain (5/1), R radial arterial line (5/5 - 5/9), bowden (5/5 -     ADVANCED DIRECTIVES:  Full Code    HCP/Emergency Contact-    INDICATION FOR SICU: hemodynamic instability requiring vasopressor support    DISPO:  SICU Assessment and Plan:	  58y Female s/p robotic-assisted hiatal herniorraphy w/ gastric bypass (4/29), diagnostic laparoscopy with released and revised JJ anastomosis narrowing w/ negative leak test (4/30).    NEUROLOGICAL:  #Acute pain  - acetaminophen   Oral Liquid .. 650 milliGRAM(s) Oral every 6 hours  - HYDROmorphone  Injectable 0.2 milliGRAM(s) IV Push every 12 hours PRN Moderate Pain (4 - 6)  - HYDROmorphone  Injectable 0.5 milliGRAM(s) IV Push every 4 hours PRN Severe Pain (7 - 10)  #Depression  - HOLDING home duloxetine  - HOLDING home wellbutrin  - HOLDING home amitriptyline    RESPIRATORY:   #Mechanical ventilation secondary to aspiration pneumonia with hypoxic respiratory failure - resolving  - intubated 5/5 - 5/10, extubated after parameters were met  - 5/10 PM: Acute desaturation to SpO2 78% on 5L NC -> CPAP 40%/5 > transitioned to HFNC 40L/40%  - ABG on HFNC 40/40: 7.51/34/105/27 Lac 1.0  - sputum culture prelim negative    CARDIOVASCULAR:   #Acute hypotension secondary to sedation requirement - resolved  - Levophed gtt currently off  #HTN  - HOLDING home antihypertensives (losartan, spironolactone)  - Elevated troponemia --> downtrended, no longer trending  - cardiology recs appreciated-->pending cards eval for echo with hyperdynamic HF and severe pulm HTN  ECHO (5/6): EF 70%/G1DD/TR/severe pulmonary HTN    GASTROINTESTINAL/NUTRITION:   #s/p robotic-assisted hiatal herniorraphy w/ gastric bypass (4/29),   # s/p diagnostic laparoscopy with released and revised JJ anastomosis narrowing w/ negative leak test (4/30)  - Diet: Hari clears with Hari ensures  - LLQ CECE drain d/c'd   - NG tube - d/c'd 5/10  - aspiration precautions  - head of bed 30 degrees  #GI Prophylaxis  - protonix PO  #Bowel regimen  - Last BM 5/8    /RENAL:   #urine output in critically ill  - bowden placed 5/5 > D/C'd 5/12 pending TOV 9AM   #IVF  - dD5 1/2 NS at 75mL/hr  #intermittent diuresis  - PO azetazolamide 125 PO daily started for severe pHTN  Monitor UO-bowden in place  #KIKA - resolved  - Non-oliguric            BUN/Cr- 6/0.5  -->,  7/0.5  -->          Electrolytes-(05-12 @ 01:01)Na 138 // K 3.9 // Mg 2.0 // Phos 2.5 - phos repleted      HEME/ONC:   #DVT prophylaxis  - Lovenox 40 q12  - SCDs    Labs: Hb/Hct:  10.2/34.1  -->,  9.8/32.8  -->                      Plts:  303  -->,  296  -->                 PTT/INR:          ID:   WBC- 16.38  --->>,  15.67  --->>,  13.08  --->>    Temp trend- 24hrs T(F): 99 (05-11 @ 23:00), Max: 99 (05-11 @ 06:00)  Current antibiotics- piperacillin/tazobactam IVPB.. 3.375 every 8 hours (5/2 to end 5/8) for severe aspiration pneumonitis, restarted 5/8  Cultures:  - MRSA negative  - sputum culture Numerous Klebsiella pneumoniae and Normal Respiratory Jennifer present prelim, gram stain final with few gpc, few gnr  - (5/7): Tracheal aspirate culture: negative  - repeat sputum cx- Few GPC in pairs, rare GPR  - no longer trending procal    ENDOCRINOLOGY:  - Glucose goal 140-180    MSK:  Activity - increased as tolerated    LINES/DRAINS:  PIVs, bilateral cephalic midlines (5/1), LLQ drain (5/1), R radial arterial line (5/5 - 5/9), bowden (5/5 -     ADVANCED DIRECTIVES:  Full Code    HCP/Emergency Contact-    INDICATION FOR SICU: hemodynamic instability requiring vasopressor support    DISPO:  SICU

## 2024-05-12 NOTE — PROGRESS NOTE ADULT - SUBJECTIVE AND OBJECTIVE BOX
BARIATRIC SURGERY PROGRESS NOTE    Patient: SARAH BERGMAN , 58y ([birth year])Female   MRN: [id number]  Location: [address]  Visit: 04-29-24 Inpatient  Date: 05-12-24 @ 05:05    Hospital Day #:  Post-Op Day #:    PROCEDURE/DX/INJURIES:     INTERVAL HX:     PAST MEDICAL & SURGICAL HISTORY:  Asthma      Depression      GERD (gastroesophageal reflux disease)      Neck pain      Back pain      HTN (hypertension)      RAFA (obstructive sleep apnea)      Pseudogout      Renal stones      Migraines      Severe obesity (BMI >= 40)      History of cholecystectomy      H/O bladder repair surgery      Renal stones      H/O removal of cyst          VITALS:   T(F): 99 (05-11-24 @ 23:00), Max: 99 (05-11-24 @ 06:00)  HR: 89 (05-12-24 @ 04:00)  BP: 143/66 (05-12-24 @ 04:00)  RR: 27 (05-12-24 @ 04:00)  SpO2: 98% (05-12-24 @ 04:00)      DIET:   Diet, Clear Liquid:   Consistent Carbohydrate No Snacks (CSTCHO)  Bariatric Clear Liquid (BARICLLIQ)  Supplement Feeding Modality:  Oral  Ensure Clear Cans or Servings Per Day:  3       Frequency:  Three Times a day (05-11-24 @ 09:26) [Active]      FLUIDS: dextrose 5% + sodium chloride 0.45%.: Solution, 1000 milliLiter(s) infuse at 75 mL/Hr (05-11-24 @ 09:27)      I & O's:    05-10-24 @ 07:01  -  05-11-24 @ 07:00  --------------------------------------------------------  IN:    IV PiggyBack: 378 mL    IV PiggyBack: 275 mL    Oral Fluid: 600 mL  Total IN: 1253 mL    OUT:    Bulb (mL): 90 mL    FentaNYL: 0 mL    Indwelling Catheter - Urethral (mL): 2935 mL    Norepinephrine: 0 mL    Propofol: 0 mL  Total OUT: 3025 mL    Total NET: -1772 mL      05-11-24 @ 07:01  -  05-12-24 @ 05:05  --------------------------------------------------------  IN:    dextrose 5% + sodium chloride 0.45%: 1425 mL    IV PiggyBack: 200 mL  Total IN: 1625 mL    OUT:    Indwelling Catheter - Urethral (mL): 1790 mL  Total OUT: 1790 mL    Total NET: -165 mL          Bowel Movement: : [] YES [] NO  Flatus: : [] YES [] NO    PHYSICAL EXAM:  General: NAD, AAOx3, calm and cooperative  HEENT: EOMI, sclera non-icteric.  Heart: RRR   Lungs: Breathing comfortably  Abdomen:  Soft, ***nontender, nondistended.   MSK/Extremities: Warm & well-perfused.   Skin: Warm, dry. No jaundice.   Incision/wound: healing well, dressings in place, clean, dry and intact    MEDICATIONS  (STANDING):  acetaminophen   Oral Liquid .. 650 milliGRAM(s) Oral every 6 hours  acetaZOLAMIDE    Tablet 125 milliGRAM(s) Oral every 24 hours  artificial  tears Solution 1 Drop(s) Both EYES two times a day  chlorhexidine 2% Cloths 1 Application(s) Topical <User Schedule>  dextrose 5% + sodium chloride 0.45%. 1000 milliLiter(s) (75 mL/Hr) IV Continuous <Continuous>  enoxaparin Injectable 40 milliGRAM(s) SubCutaneous every 12 hours  pantoprazole    Tablet 40 milliGRAM(s) Oral daily  piperacillin/tazobactam IVPB.. 4.5 Gram(s) IV Intermittent every 8 hours  sodium phosphate 30 milliMole(s)/500 mL IVPB 30 milliMole(s) IV Intermittent once    MEDICATIONS  (PRN):  albuterol    90 MICROgram(s) HFA Inhaler 2 Puff(s) Inhalation every 6 hours PRN Shortness of Breath and/or Wheezing  HYDROmorphone  Injectable 0.2 milliGRAM(s) IV Push every 12 hours PRN Moderate Pain (4 - 6)  HYDROmorphone  Injectable 0.5 milliGRAM(s) IV Push every 4 hours PRN Severe Pain (7 - 10)  ondansetron Injectable 4 milliGRAM(s) IV Push every 8 hours PRN Nausea and/or Vomiting      DVT PROPHYLAXIS: enoxaparin Injectable 40 milliGRAM(s) SubCutaneous every 12 hours    GI PROPHYLAXIS: pantoprazole    Tablet 40 milliGRAM(s) Oral daily    ANTICOAGULATION:   ANTIBIOTICS:  piperacillin/tazobactam IVPB.. 4.5 Gram(s)          LAB/STUDIES:  Labs:  CAPILLARY BLOOD GLUCOSE      POCT Blood Glucose.: 105 mg/dL (11 May 2024 23:11)  POCT Blood Glucose.: 98 mg/dL (11 May 2024 18:13)  POCT Blood Glucose.: 103 mg/dL (11 May 2024 12:36)                          9.8    13.08 )-----------( 296      ( 12 May 2024 01:01 )             32.8         05-12    138  |  103  |  7<L>  ----------------------------<  113<H>  3.9   |  28  |  0.5<L>      Calcium: 8.4 mg/dL (05-12-24 @ 01:01)      LFTs:     Blood Gas Arterial, Lactate: 1.0 mmol/L (05-11-24 @ 21:32)  Blood Gas Arterial, Lactate: 0.7 mmol/L (05-11-24 @ 13:02)  Blood Gas Arterial, Lactate: 0.9 mmol/L (05-10-24 @ 15:17)  Blood Gas Arterial, Lactate: 1.4 mmol/L (05-10-24 @ 09:29)  Blood Gas Arterial, Lactate: 0.9 mmol/L (05-10-24 @ 03:34)  Blood Gas Arterial, Lactate: 1.0 mmol/L (05-09-24 @ 21:04)  Blood Gas Arterial, Lactate: 1.1 mmol/L (05-09-24 @ 16:10)    ABG - ( 11 May 2024 21:32 )  pH: 7.51  /  pCO2: 34    /  pO2: 105   / HCO3: 27    / Base Excess: 4.3   /  SaO2: x               ABG - ( 11 May 2024 13:02 )  pH: 7.41  /  pCO2: 45    /  pO2: 73    / HCO3: 28    / Base Excess: 3.4   /  SaO2: 97.2            ABG - ( 10 May 2024 15:17 )  pH: 7.42  /  pCO2: 47    /  pO2: 86    / HCO3: 30    / Base Excess: 5.1   /  SaO2: 99.4              Coags:            Urinalysis Basic - ( 12 May 2024 01:01 )    Color: x / Appearance: x / SG: x / pH: x  Gluc: 113 mg/dL / Ketone: x  / Bili: x / Urobili: x   Blood: x / Protein: x / Nitrite: x   Leuk Esterase: x / RBC: x / WBC x   Sq Epi: x / Non Sq Epi: x / Bacteria: x        Culture - Sputum (collected 10 May 2024 11:40)  Source: Trach Asp Tracheal Aspirate  Gram Stain (11 May 2024 03:40):    No polymorphonuclear leukocytes per low power field    Moderate Squamous epithelial cells per low power field    Few Gram positive cocci in pairs seen per oil power field    Rare Gram Positive Rods seen per oil power field  Preliminary Report (11 May 2024 18:21):    Normal Respiratory Jennifer present              IMAGING:      ACCESS/ DEVICES:  [ ] Peripheral IV  [ ] Central Venous Line	[ ] R	[ ] L	[ ] IJ	[ ] Fem	[ ] SC	Placed:   [ ] Arterial Line		[ ] R	[ ] L	[ ] Fem	[ ] Rad	[ ] Ax	Placed:   [ ] PICC:					[ ] Mediport  [ ] Urinary Catheter,  Date Placed:   [ ] Chest tube: [ ] Right, [ ] Left  [ ] CECE/William Drains      ASSESSMENT:  58y F w/ PMHx of  ****    PLAN:  -  -  -   GENERAL SURGERY PROGRESS NOTE    Patient: SARAH BERGMAN , 58y ([birth year])Female   MRN: [id number]  Location: [address]  Visit: 04-29-24 Inpatient  Date: 05-12-24 @ 05:05    Hospital Day #: 13  Post-Op Day #:  12    PROCEDURE/DX/INJURIES:   4/12 - Gastric bypass, hiatal hernia repair   5/1 - Diagnostic laparoscopy, revision of hiatal hernia repair w/ mesh, intra-op endoscopy     INTERVAL HX:   On HFNC. Tolerating Hari clears. Drain d/c'd yesterday.   Afebrile, HDS. Off pressors.     PAST MEDICAL & SURGICAL HISTORY:  ·	Asthma  ·	Depression  ·	GERD (gastroesophageal reflux disease)  ·	Neck pain  ·	Back pain  ·	HTN (hypertension)  ·	RAFA (obstructive sleep apnea)  ·	Pseudogout  ·	Renal stones  ·	Migraines  ·	Severe obesity (BMI >= 40)  ·	History of cholecystectomy  ·	H/O bladder repair surgery  ·	H/O removal of cyst    VITALS:   T(F): 99 (05-11-24 @ 23:00), Max: 99 (05-11-24 @ 06:00)  HR: 89 (05-12-24 @ 04:00)  BP: 143/66 (05-12-24 @ 04:00)  RR: 27 (05-12-24 @ 04:00)  SpO2: 98% (05-12-24 @ 04:00)      DIET:   Diet, Clear Liquid:   Consistent Carbohydrate No Snacks (CSTCHO)  Bariatric Clear Liquid (BARICLLIQ)  Supplement Feeding Modality:  Oral  Ensure Clear Cans or Servings Per Day:  3       Frequency:  Three Times a day (05-11-24 @ 09:26) [Active]      FLUIDS: dextrose 5% + sodium chloride 0.45%.: Solution, 1000 milliLiter(s) infuse at 75 mL/Hr (05-11-24 @ 09:27)      I & O's:    05-10-24 @ 07:01  -  05-11-24 @ 07:00  --------------------------------------------------------  IN:    IV PiggyBack: 378 mL    IV PiggyBack: 275 mL    Oral Fluid: 600 mL  Total IN: 1253 mL    OUT:    Bulb (mL): 90 mL    FentaNYL: 0 mL    Indwelling Catheter - Urethral (mL): 2935 mL    Norepinephrine: 0 mL    Propofol: 0 mL  Total OUT: 3025 mL    Total NET: -1772 mL      05-11-24 @ 07:01  -  05-12-24 @ 05:05  --------------------------------------------------------  IN:    dextrose 5% + sodium chloride 0.45%: 1425 mL    IV PiggyBack: 200 mL  Total IN: 1625 mL    OUT:    Indwelling Catheter - Urethral (mL): 1790 mL  Total OUT: 1790 mL    Total NET: -165 mL    PHYSICAL EXAM:  General: NAD, AAOx3, calm and cooperative  HEENT: EOMI, sclera non-icteric.  Heart: Regular rate   Lungs: on HFNC   Abdomen:  Soft, nontender, nondistended.   MSK/Extremities: Warm & well-perfused.   Skin: Warm, dry. No jaundice.   Incision/wound: healing well, dressings in place, clean, dry and intact    MEDICATIONS  (STANDING):  acetaminophen   Oral Liquid .. 650 milliGRAM(s) Oral every 6 hours  acetaZOLAMIDE    Tablet 125 milliGRAM(s) Oral every 24 hours  artificial  tears Solution 1 Drop(s) Both EYES two times a day  chlorhexidine 2% Cloths 1 Application(s) Topical <User Schedule>  dextrose 5% + sodium chloride 0.45%. 1000 milliLiter(s) (75 mL/Hr) IV Continuous <Continuous>  enoxaparin Injectable 40 milliGRAM(s) SubCutaneous every 12 hours  pantoprazole    Tablet 40 milliGRAM(s) Oral daily  piperacillin/tazobactam IVPB.. 4.5 Gram(s) IV Intermittent every 8 hours  sodium phosphate 30 milliMole(s)/500 mL IVPB 30 milliMole(s) IV Intermittent once    MEDICATIONS  (PRN):  albuterol    90 MICROgram(s) HFA Inhaler 2 Puff(s) Inhalation every 6 hours PRN Shortness of Breath and/or Wheezing  HYDROmorphone  Injectable 0.2 milliGRAM(s) IV Push every 12 hours PRN Moderate Pain (4 - 6)  HYDROmorphone  Injectable 0.5 milliGRAM(s) IV Push every 4 hours PRN Severe Pain (7 - 10)  ondansetron Injectable 4 milliGRAM(s) IV Push every 8 hours PRN Nausea and/or Vomiting      DVT PROPHYLAXIS: enoxaparin Injectable 40 milliGRAM(s) SubCutaneous every 12 hours  GI PROPHYLAXIS: pantoprazole    Tablet 40 milliGRAM(s) Oral daily  ANTICOAGULATION:  --  ANTIBIOTICS:  piperacillin/tazobactam IVPB.. 4.5 Gram(s)    LAB/STUDIES:  POCT Blood Glucose.: 105 mg/dL (11 May 2024 23:11)  POCT Blood Glucose.: 98 mg/dL (11 May 2024 18:13)  POCT Blood Glucose.: 103 mg/dL (11 May 2024 12:36)                          9.8    13.08 )-----------( 296      ( 12 May 2024 01:01 )             32.8         05-12    138  |  103  |  7<L>  ----------------------------<  113<H>  3.9   |  28  |  0.5<L>      Calcium: 8.4 mg/dL (05-12-24 @ 01:01)      LFTs:     Blood Gas Arterial, Lactate: 1.0 mmol/L (05-11-24 @ 21:32)  Blood Gas Arterial, Lactate: 0.7 mmol/L (05-11-24 @ 13:02)  Blood Gas Arterial, Lactate: 0.9 mmol/L (05-10-24 @ 15:17)  Blood Gas Arterial, Lactate: 1.4 mmol/L (05-10-24 @ 09:29)  Blood Gas Arterial, Lactate: 0.9 mmol/L (05-10-24 @ 03:34)  Blood Gas Arterial, Lactate: 1.0 mmol/L (05-09-24 @ 21:04)  Blood Gas Arterial, Lactate: 1.1 mmol/L (05-09-24 @ 16:10)    ABG - ( 11 May 2024 21:32 )  pH: 7.51  /  pCO2: 34    /  pO2: 105   / HCO3: 27    / Base Excess: 4.3   /  SaO2: x         ABG - ( 11 May 2024 13:02 )  pH: 7.41  /  pCO2: 45    /  pO2: 73    / HCO3: 28    / Base Excess: 3.4   /  SaO2: 97.2      ABG - ( 10 May 2024 15:17 )  pH: 7.42  /  pCO2: 47    /  pO2: 86    / HCO3: 30    / Base Excess: 5.1   /  SaO2: 99.4      Urinalysis Basic - ( 12 May 2024 01:01 )    Color: x / Appearance: x / SG: x / pH: x  Gluc: 113 mg/dL / Ketone: x  / Bili: x / Urobili: x   Blood: x / Protein: x / Nitrite: x   Leuk Esterase: x / RBC: x / WBC x   Sq Epi: x / Non Sq Epi: x / Bacteria: x      MICROBIOLOGY:   Culture - Sputum (collected 10 May 2024 11:40)  Source: Trach Asp Tracheal Aspirate  Gram Stain (11 May 2024 03:40):    No polymorphonuclear leukocytes per low power field    Moderate Squamous epithelial cells per low power field    Few Gram positive cocci in pairs seen per oil power field    Rare Gram Positive Rods seen per oil power field  Preliminary Report (11 May 2024 18:21):    Normal Respiratory Jennifer present    IMAGING:  --> Xray Chest 1 View- PORTABLE-Routine (05.11.24 @ 05:23) >  Findings:    Support devices: None.    Cardiac/mediastinum/hilum: Unchanged    Lung parenchyma/Pleura: Worsening bilateral opacities    Skeleton/soft tissues: Unchanged    Impression:    Worsening bilateral opacities    --- End of Report ---      ACCESS/ DEVICES:  [X] Peripheral IV  [ ] Central Venous Line	[ ] R	[ ] L	[ ] IJ	[ ] Fem	[ ] SC	Placed:   [ ] Arterial Line		[ ] R	[ ] L	[ ] Fem	[ ] Rad	[ ] Ax	Placed:   [ ] PICC:					[ ] Mediport  [X] Urinary Catheter,  Date Placed: 5/5  [ ] Chest tube: [ ] Right, [ ] Left  [ ] CECE/William Drains

## 2024-05-13 LAB
ANION GAP SERPL CALC-SCNC: 11 MMOL/L — SIGNIFICANT CHANGE UP (ref 7–14)
BUN SERPL-MCNC: 7 MG/DL — LOW (ref 10–20)
CALCIUM SERPL-MCNC: 8.5 MG/DL — SIGNIFICANT CHANGE UP (ref 8.4–10.5)
CHLORIDE SERPL-SCNC: 106 MMOL/L — SIGNIFICANT CHANGE UP (ref 98–110)
CO2 SERPL-SCNC: 22 MMOL/L — SIGNIFICANT CHANGE UP (ref 17–32)
CREAT SERPL-MCNC: 0.5 MG/DL — LOW (ref 0.7–1.5)
EGFR: 109 ML/MIN/1.73M2 — SIGNIFICANT CHANGE UP
GLUCOSE SERPL-MCNC: 98 MG/DL — SIGNIFICANT CHANGE UP (ref 70–99)
HCT VFR BLD CALC: 32.4 % — LOW (ref 37–47)
HGB BLD-MCNC: 10 G/DL — LOW (ref 12–16)
MAGNESIUM SERPL-MCNC: 2 MG/DL — SIGNIFICANT CHANGE UP (ref 1.8–2.4)
MCHC RBC-ENTMCNC: 26.7 PG — LOW (ref 27–31)
MCHC RBC-ENTMCNC: 30.9 G/DL — LOW (ref 32–37)
MCV RBC AUTO: 86.4 FL — SIGNIFICANT CHANGE UP (ref 81–99)
NRBC # BLD: 0 /100 WBCS — SIGNIFICANT CHANGE UP (ref 0–0)
PHOSPHATE SERPL-MCNC: 3.1 MG/DL — SIGNIFICANT CHANGE UP (ref 2.1–4.9)
PLATELET # BLD AUTO: 340 K/UL — SIGNIFICANT CHANGE UP (ref 130–400)
PMV BLD: 10.4 FL — SIGNIFICANT CHANGE UP (ref 7.4–10.4)
POTASSIUM SERPL-MCNC: 4.2 MMOL/L — SIGNIFICANT CHANGE UP (ref 3.5–5)
POTASSIUM SERPL-SCNC: 4.2 MMOL/L — SIGNIFICANT CHANGE UP (ref 3.5–5)
RBC # BLD: 3.75 M/UL — LOW (ref 4.2–5.4)
RBC # FLD: 15.7 % — HIGH (ref 11.5–14.5)
SODIUM SERPL-SCNC: 139 MMOL/L — SIGNIFICANT CHANGE UP (ref 135–146)
WBC # BLD: 11.62 K/UL — HIGH (ref 4.8–10.8)
WBC # FLD AUTO: 11.62 K/UL — HIGH (ref 4.8–10.8)

## 2024-05-13 PROCEDURE — 71045 X-RAY EXAM CHEST 1 VIEW: CPT | Mod: 26

## 2024-05-13 PROCEDURE — 99291 CRITICAL CARE FIRST HOUR: CPT | Mod: 24

## 2024-05-13 RX ORDER — LOSARTAN POTASSIUM 100 MG/1
50 TABLET, FILM COATED ORAL DAILY
Refills: 0 | Status: DISCONTINUED | OUTPATIENT
Start: 2024-05-13 | End: 2024-05-15

## 2024-05-13 RX ORDER — BUPROPION HYDROCHLORIDE 150 MG/1
150 TABLET, EXTENDED RELEASE ORAL DAILY
Refills: 0 | Status: DISCONTINUED | OUTPATIENT
Start: 2024-05-13 | End: 2024-05-15

## 2024-05-13 RX ORDER — LANOLIN ALCOHOL/MO/W.PET/CERES
5 CREAM (GRAM) TOPICAL AT BEDTIME
Refills: 0 | Status: DISCONTINUED | OUTPATIENT
Start: 2024-05-13 | End: 2024-05-15

## 2024-05-13 RX ORDER — SPIRONOLACTONE 25 MG/1
50 TABLET, FILM COATED ORAL DAILY
Refills: 0 | Status: DISCONTINUED | OUTPATIENT
Start: 2024-05-13 | End: 2024-05-15

## 2024-05-13 RX ORDER — LIDOCAINE 4 G/100G
1 CREAM TOPICAL DAILY
Refills: 0 | Status: DISCONTINUED | OUTPATIENT
Start: 2024-05-13 | End: 2024-05-13

## 2024-05-13 RX ORDER — LIDOCAINE 4 G/100G
1 CREAM TOPICAL DAILY
Refills: 0 | Status: DISCONTINUED | OUTPATIENT
Start: 2024-05-13 | End: 2024-05-15

## 2024-05-13 RX ORDER — PIPERACILLIN AND TAZOBACTAM 4; .5 G/20ML; G/20ML
4.5 INJECTION, POWDER, LYOPHILIZED, FOR SOLUTION INTRAVENOUS EVERY 8 HOURS
Refills: 0 | Status: COMPLETED | OUTPATIENT
Start: 2024-05-13 | End: 2024-05-14

## 2024-05-13 RX ORDER — LIDOCAINE 4 G/100G
1 CREAM TOPICAL DAILY
Refills: 0 | Status: DISCONTINUED | OUTPATIENT
Start: 2024-05-14 | End: 2024-05-15

## 2024-05-13 RX ORDER — LIDOCAINE 4 G/100G
1 CREAM TOPICAL ONCE
Refills: 0 | Status: COMPLETED | OUTPATIENT
Start: 2024-05-13 | End: 2024-05-13

## 2024-05-13 RX ORDER — MONTELUKAST 4 MG/1
10 TABLET, CHEWABLE ORAL AT BEDTIME
Refills: 0 | Status: DISCONTINUED | OUTPATIENT
Start: 2024-05-13 | End: 2024-05-15

## 2024-05-13 RX ORDER — AMITRIPTYLINE HCL 25 MG
10 TABLET ORAL AT BEDTIME
Refills: 0 | Status: DISCONTINUED | OUTPATIENT
Start: 2024-05-13 | End: 2024-05-15

## 2024-05-13 RX ORDER — DULOXETINE HYDROCHLORIDE 30 MG/1
60 CAPSULE, DELAYED RELEASE ORAL EVERY 12 HOURS
Refills: 0 | Status: DISCONTINUED | OUTPATIENT
Start: 2024-05-13 | End: 2024-05-15

## 2024-05-13 RX ADMIN — LIDOCAINE 1 PATCH: 4 CREAM TOPICAL at 14:00

## 2024-05-13 RX ADMIN — LOSARTAN POTASSIUM 50 MILLIGRAM(S): 100 TABLET, FILM COATED ORAL at 11:05

## 2024-05-13 RX ADMIN — PIPERACILLIN AND TAZOBACTAM 25 GRAM(S): 4; .5 INJECTION, POWDER, LYOPHILIZED, FOR SOLUTION INTRAVENOUS at 15:16

## 2024-05-13 RX ADMIN — LIDOCAINE 1 PATCH: 4 CREAM TOPICAL at 06:53

## 2024-05-13 RX ADMIN — Medication 650 MILLIGRAM(S): at 06:29

## 2024-05-13 RX ADMIN — Medication 10 MILLIGRAM(S): at 22:01

## 2024-05-13 RX ADMIN — DULOXETINE HYDROCHLORIDE 60 MILLIGRAM(S): 30 CAPSULE, DELAYED RELEASE ORAL at 17:54

## 2024-05-13 RX ADMIN — PANTOPRAZOLE SODIUM 40 MILLIGRAM(S): 20 TABLET, DELAYED RELEASE ORAL at 11:04

## 2024-05-13 RX ADMIN — MONTELUKAST 10 MILLIGRAM(S): 4 TABLET, CHEWABLE ORAL at 22:02

## 2024-05-13 RX ADMIN — Medication 1 DROP(S): at 05:56

## 2024-05-13 RX ADMIN — ENOXAPARIN SODIUM 40 MILLIGRAM(S): 100 INJECTION SUBCUTANEOUS at 05:49

## 2024-05-13 RX ADMIN — CHLORHEXIDINE GLUCONATE 1 APPLICATION(S): 213 SOLUTION TOPICAL at 05:56

## 2024-05-13 RX ADMIN — DULOXETINE HYDROCHLORIDE 60 MILLIGRAM(S): 30 CAPSULE, DELAYED RELEASE ORAL at 11:04

## 2024-05-13 RX ADMIN — Medication 5 MILLIGRAM(S): at 02:07

## 2024-05-13 RX ADMIN — Medication 600 MILLIGRAM(S): at 17:55

## 2024-05-13 RX ADMIN — LIDOCAINE 1 PATCH: 4 CREAM TOPICAL at 02:17

## 2024-05-13 RX ADMIN — Medication 650 MILLIGRAM(S): at 11:34

## 2024-05-13 RX ADMIN — ENOXAPARIN SODIUM 40 MILLIGRAM(S): 100 INJECTION SUBCUTANEOUS at 17:55

## 2024-05-13 RX ADMIN — PIPERACILLIN AND TAZOBACTAM 25 GRAM(S): 4; .5 INJECTION, POWDER, LYOPHILIZED, FOR SOLUTION INTRAVENOUS at 05:49

## 2024-05-13 RX ADMIN — BUPROPION HYDROCHLORIDE 150 MILLIGRAM(S): 150 TABLET, EXTENDED RELEASE ORAL at 11:04

## 2024-05-13 RX ADMIN — LIDOCAINE 1 PATCH: 4 CREAM TOPICAL at 02:07

## 2024-05-13 RX ADMIN — Medication 600 MILLIGRAM(S): at 05:49

## 2024-05-13 RX ADMIN — PIPERACILLIN AND TAZOBACTAM 25 GRAM(S): 4; .5 INJECTION, POWDER, LYOPHILIZED, FOR SOLUTION INTRAVENOUS at 22:01

## 2024-05-13 RX ADMIN — Medication 650 MILLIGRAM(S): at 11:04

## 2024-05-13 RX ADMIN — Medication 5 MILLIGRAM(S): at 22:01

## 2024-05-13 RX ADMIN — Medication 650 MILLIGRAM(S): at 05:49

## 2024-05-13 RX ADMIN — SPIRONOLACTONE 50 MILLIGRAM(S): 25 TABLET, FILM COATED ORAL at 11:04

## 2024-05-13 NOTE — PROGRESS NOTE ADULT - ASSESSMENT
ASSESSMENT:  58y F w/ PMHx of  HTN, RAFA (not on home CPAP/BiPAP), GERD, pseudogout, migraines, renal stones, h/o depression and anxiety, surgical history of bladder repair and cholecystectomy, who presented to [facility] on 4/29/2024 for elective robotic-assisted hiatal herniorrhaphy w/ gastric bypass. Post-operative course c/b abdominal pain and emesis. CT abdomen/pelvis obtained which showed a narrowed JJ anastomosis. On 5/1, patient was taken back to the OR for a diagnostic laparoscopy, release and revision of jejunojejunal anastomosis w/ negative leak test. Concern for aspiration intra-operatively, patient found to be hypotensive and tachycardic post-op. Admitted to SICU for resuscitation and pressor needs.     5/5 - Intubated, mechanically ventilated 2/2 aspiration pneumonia w/ hypoxic respiratory failure   5/10 - Extubated after parameters were met; acute desaturation to SpO2 78% on 5L NC --> CPAP 40%/5 --> transitioned to HFNC 40L/40%     PLAN:  - Continue Hari clears, monitor tolerance  - Daily AM CXR and ABG  - Monitor respiratory status and O2 saturations, wean off high-flow nasal cannula as tolerated   - Zosyn 4.5g q8h   - PPX: Lovenox 40 BID, PTX 40 qd   - Encourage PO intake  - Monitor labs and replete as necessary  - Monitor for bowel function  - Continue Pain Medications PRN  - Encourage ambulation as tolerated  - Monitor urine output  - Follow PT/Physiatry    Lines/Tubes: PIV, midline    BLUE TEAM SPECTRA [id number]

## 2024-05-13 NOTE — PROGRESS NOTE ADULT - ASSESSMENT
Assessment and Plan:	  58y Female s/p robotic-assisted hiatal herniorraphy w/ gastric bypass (4/29), diagnostic laparoscopy with released and revised JJ anastomosis narrowing w/ negative leak test (4/30). Hospital course complicated by aspiration pneumonia and ARDS requiring upgrade to SICU and intubation on 5/6. Now extubated on 5/10 tolerating HFNC.    NEUROLOGICAL:  #Acute pain- controlled  - acetaminophen   Oral Liquid .. 650 milliGRAM(s) Oral every 6 hours  - HYDROmorphone  Injectable 0.2 milliGRAM(s) IV Push every 12 hours PRN Moderate Pain (4 - 6)  - HYDROmorphone  Injectable 0.5 milliGRAM(s) IV Push every 4 hours PRN Severe Pain (7 - 10)  #Depression  - HOLDING home duloxetine  - HOLDING home wellbutrin  - HOLDING home amitriptyline    RESPIRATORY:   #Mechanical ventilation secondary to aspiration pneumonia with hypoxic respiratory failure - resolving  - intubated 5/5 - 5/10, extubated after parameters were met  - 5/10 PM: Acute desaturation to SpO2 78% on 5L NC -> CPAP 40%/5 > transitioned to HFNC 40L/40%  - HFNC weaned to 40L/30% on 5/12- ABG____  - sputum culture prelim negative  - CXR stable    CARDIOVASCULAR:   #Acute hypotension secondary to sedation requirement - resolved  - Levophed gtt currently off  # PMH of HTN  - BP controlled off home meds  - HOLDING home antihypertensives (losartan, spironolactone)  - Elevated troponemia --> downtrended, no longer trending  - cardiology recs appreciated-->pending cards eval for echo with hyperdynamic HF and severe pulm HTN  ECHO (5/6): EF 70%/G1DD/TR/severe pulmonary HTN    GASTROINTESTINAL/NUTRITION:   #s/p robotic-assisted hiatal herniorraphy w/ gastric bypass (4/29),   # s/p diagnostic laparoscopy with released and revised JJ anastomosis narrowing w/ negative leak test (4/30)  - Diet: Hari clears with Hari ensures  - LLQ CECE drain d/c'd   - NG tube - d/c'd 5/10  - aspiration precautions  - head of bed 30 degrees  #GI Prophylaxis  - protonix PO  #Bowel regimen  - Last BM 5/12    /RENAL:   #urine output in critically ill  - passed TOV; voiding spontaneously  #IVF  - D5 1/2 NS at 50mL/hr  #intermittent diuresis  - PO azetazolamide 125 PO daily started for severe pHTN  #KIKA - resolved  - Non-oliguric  Monitor UO-bowden in place  Labs:          BUN/Cr- 7/0.5  -->          Electrolytes-(05-12 @ 01:01)Na 138 // K 3.9 // Mg 2.0 // Phos 2.5     HEME/ONC:   #DVT prophylaxis  - Lovenox 40 q12  - SCDs  Hb/Hct:  10.2/34.1  -->,  9.8/32.8  -->,  10.0/32.4  -->  Plts:  303  -->,  296  -->,  340  -->        ID:   WBC- 15.67  --->>,  13.08  --->>,  11.62  --->>  Temp trend- 24hrs T(F): 98.8 (05-12 @ 20:00), Max: 98.8 (05-12 @ 20:00)  Antibiotics-piperacillin/tazobactam IVPB.. 4.5 every 8 hours  Current antibiotics- piperacillin/tazobactam IVPB.. 3.375 every 8 hours (5/2 to end 5/8) for severe aspiration pneumonitis, restarted 5/8  Cultures:  - MRSA negative  - sputum culture Numerous Klebsiella pneumoniae and Normal Respiratory Keyla present prelim, gram stain final with few gpc, few gnr  - (5/7): Tracheal aspirate culture: negative  - repeat sputum cx- Few GPC in pairs, rare GPR  - no longer trending procal  -(5/10): sputum  c/x -normal resp keyla, no PMNs, few gram pos cocci    ENDOCRINOLOGY:  - Glucose goal 140-180    MSK:  Activity - increased as tolerated  PT re-consulted    LINES/DRAINS:  PIVs, bilateral cephalic midlines (5/1), LLQ drain (5/1)    ADVANCED DIRECTIVES:  Full Code    HCP/Emergency Contact-    INDICATION FOR SICU: hemodynamic instability requiring vasopressor support    DISPO:  SICU

## 2024-05-13 NOTE — PROCEDURAL SAFETY CHECKLIST WITH OR WITHOUT SEDATION - [id number]_GEN_ALL_CORE
not applicable Sepsis, due to unspecified organism, unspecified whether acute organ dysfunction present

## 2024-05-13 NOTE — CHART NOTE - NSCHARTNOTEFT_GEN_A_CORE
SICU Transfer Note:    Transfer from: SICU  Transfer to:  ( X ) Surgery    (  ) Telemetry    (  ) Medicine    (  ) Palliative    (  ) Stroke Unit    (  ) _______________    HPI   59 y/o female BMI 42.4 PMH HTN, RAFA (not on home CPAP/BiPAP) GERD, pseudogout, migraines, renal stones, depression, asthma; PSH bladder repair, cholecystectomy. On 4/29/24, patient underwent an elective robotic-assisted hiatal herniorraphy w/ gastric bypass. On 4/30, patient was having abdominal pain and vomiting with a concern for possible aspiration. Patient underwent CT abdomen and pelvis showed a narrowed JJ anastomosis. On 4/31, patient was taken back to the OR for a diagnostic laparoscopy and found a narrowed jejunojejunal anastomosis, released and revised w/ negative leak test.    On 5/1, patient was found to by hypotensive and tachycardic on 4C by blue team. SICU consulted for resuscitation and hemodynamic instability. Patient was seen bedside on 4C. Patient is a/o x3, systolic BP in the 80s (improving to the 90s with fluid boluses), -110 sinus rhythm, SpO2 99% on NRFM at 15 LPM. Patient reports lower back pain and lower abdominal pain. Patient denies chest pain, dyspnea (although full inspiratory effort is limited by abdominal pain), nausea, vomiting, lightheadedness, dizziness.    Blue team bedside and says that current abdominal exam is her baseline and has not changed since post-op.        SICU Course:  Patient developed worsening hypoxic respiratory failure/ ARDS from severe aspiration pneumonia and was intubated on 5/5. While intubated, patient was paralyzed on nimbex from 5/5 - 5/7. Patient was then extubated on 5/10 to HFNC which was eventually weaned to nasal cannula today. Patient was also on vasopressors during SICU course, but was able to be weaned off after extubation and has remained off since 5/10. NG tube was removed 5/10 and CECE drain was removed 5/11. Patient's ARDS has been resolving and she is stable for downgrade to 4C.      PAST MEDICAL & SURGICAL HISTORY:  Asthma      Depression      GERD (gastroesophageal reflux disease)      Neck pain      Back pain      HTN (hypertension)      RAFA (obstructive sleep apnea)      Pseudogout      Renal stones      Migraines      Severe obesity (BMI >= 40)      History of cholecystectomy      H/O bladder repair surgery      Renal stones      H/O removal of cyst        Allergies    contrast media (gadolinium-based) (Rash)    Intolerances      MEDICATIONS  (STANDING):  acetaminophen   Oral Liquid .. 650 milliGRAM(s) Oral every 6 hours  amitriptyline 10 milliGRAM(s) Oral at bedtime  artificial  tears Solution 1 Drop(s) Both EYES two times a day  buPROPion XL (24-Hour) . 150 milliGRAM(s) Oral daily  chlorhexidine 2% Cloths 1 Application(s) Topical <User Schedule>  DULoxetine 60 milliGRAM(s) Oral every 12 hours  enoxaparin Injectable 40 milliGRAM(s) SubCutaneous every 12 hours  guaiFENesin  milliGRAM(s) Oral every 12 hours  lidocaine   4% Patch 1 Patch Transdermal daily  losartan 50 milliGRAM(s) Oral daily  melatonin 5 milliGRAM(s) Oral at bedtime  montelukast 10 milliGRAM(s) Oral at bedtime  oxycodone    5 mG/acetaminophen 325 mG 1 Tablet(s) Oral at bedtime  pantoprazole    Tablet 40 milliGRAM(s) Oral daily  piperacillin/tazobactam IVPB.. 4.5 Gram(s) IV Intermittent every 8 hours  spironolactone 50 milliGRAM(s) Oral daily    MEDICATIONS  (PRN):  albuterol    90 MICROgram(s) HFA Inhaler 2 Puff(s) Inhalation every 6 hours PRN Shortness of Breath and/or Wheezing  ondansetron Injectable 4 milliGRAM(s) IV Push every 8 hours PRN Nausea and/or Vomiting        Vital Signs Last 24 Hrs  T(C): 36.9 (13 May 2024 12:00), Max: 37.2 (13 May 2024 07:00)  T(F): 98.4 (13 May 2024 12:00), Max: 98.9 (13 May 2024 07:00)  HR: 85 (13 May 2024 13:00) (78 - 97)  BP: 152/82 (13 May 2024 13:00) (110/53 - 168/74)  BP(mean): 111 (13 May 2024 13:00) (76 - 111)  RR: 27 (13 May 2024 13:00) (23 - 32)  SpO2: 99% (13 May 2024 13:00) (91% - 99%)    Parameters below as of 13 May 2024 12:00  Patient On (Oxygen Delivery Method): nasal cannula  O2 Flow (L/min): 4    I&O's Summary    12 May 2024 07:01  -  13 May 2024 07:00  --------------------------------------------------------  IN: 1535 mL / OUT: 4200 mL / NET: -2665 mL    13 May 2024 07:01  -  13 May 2024 14:50  --------------------------------------------------------  IN: 150 mL / OUT: 600 mL / NET: -450 mL        LABS                                            10.0                  Neurophils% (auto):   x      (05-13 @ 00:16):    11.62)-----------(340          Lymphocytes% (auto):  x                                             32.4                   Eosinphils% (auto):   x        Manual%: Neutrophils x    ; Lymphocytes x    ; Eosinophils x    ; Bands%: x    ; Blasts x                                    139    |  106    |  7                   Calcium: 8.5   / iCa: x      (05-13 @ 00:16)    ----------------------------<  98        Magnesium: 2.0                              4.2     |  22     |  0.5              Phosphorous: 3.1        Assessment and Plan:	  58y Female s/p robotic-assisted hiatal herniorraphy w/ gastric bypass (4/29), diagnostic laparoscopy with released and revised JJ anastomosis narrowing w/ negative leak test (4/30). Hospital course complicated by aspiration pneumonia and ARDS requiring upgrade to SICU and intubation on 5/6. Now extubated on 5/10 tolerating HFNC.    NEUROLOGICAL:  #Acute pain- controlled  - acetaminophen   Oral Liquid .. 650 milliGRAM(s) Oral every 6 hours  - continue home oxycodone 7.5mg qHS   #Depression  - restarted home duloxetine  - restarted home wellbutrin  - restarted home amitriptyline    RESPIRATORY:   #Mechanical ventilation secondary to aspiration pneumonia with hypoxic respiratory failure  - resolving  - intubated 5/5 - 5/10, extubated 5/10, extubated after parameters were met  - 5/10 PM: Acute desaturation to SpO2 78% on 5L NC -> CPAP 40%/5 > transitioned to HFNC 40L/40%  - HFNC weaned to 4L NC  - sputum culture negative  - CXR stable    CARDIOVASCULAR:   #Acute hypotension secondary to sedation requirement - resolved  - off all vasopressors  # PMH of HTN  - BP controlled off home meds  - restarted home losartan and spironolactone  - Elevated troponemia --> downtrended, no longer trending  - cardiology recs appreciated  ECHO (5/6): EF 70%/G1DD/TR/severe pulmonary HTN    GASTROINTESTINAL/NUTRITION:   #s/p robotic-assisted hiatal herniorraphy w/ gastric bypass (4/29),   # s/p diagnostic laparoscopy with released and revised JJ anastomosis narrowing w/ negative leak test (4/30)  - Diet: Hari clears with Hari ensures  - LLQ CECE drain d/c'd   - NG tube - d/c'd 5/10  - aspiration precautions  - head of bed 30 degrees  #GI Prophylaxis  - protonix PO  #Bowel regimen  - Last BM 5/13    /RENAL:   #urine output in critically ill  - voiding  #IV locked  #intermittent diuresis  - restarted home spironolactone  #KIKA - resolved  - Non-oliguric    Labs:          BUN/Cr- 7/0.5  -->,  7/0.5  -->          Electrolytes-(05-13 @ 00:16)Na 139 // K 4.2 // Mg 2.0 // Phos 3.1     HEME/ONC:   #DVT prophylaxis  - Lovenox 40 q12  - SCDs    Labs: Hb/Hct:  9.8/32.8  -->,  10.0/32.4  -->                      Plts:  296  -->,  340  -->                 PTT/INR:     ID:   WBC- 15.67  --->>,  13.08  --->>,  11.62  --->>  Temp trend- 24hrs T(F): 98.4 (05-13 @ 12:00), Max: 98.9 (05-13 @ 07:00)  Antibiotics-piperacillin/tazobactam IVPB.. 4.5 every 8 hours (for aspiration pneumonia) (to end tomorrow)    Cultures:  - MRSA negative  - sputum culture Numerous Klebsiella pneumoniae and Normal Respiratory Keyla present prelim, gram stain final with few gpc, few gnr  - (5/7): Tracheal aspirate culture: negative  - repeat sputum cx- Few GPC in pairs, rare GPR  - no longer trending procal  -(5/10): sputum  c/x -normal resp keyla, no PMNs, few gram pos cocci    ENDOCRINOLOGY:  - Glucose goal 140-180    MSK:  Activity - increased as tolerated  PT re-consulted    LINES/DRAINS:  PIVs, bilateral cephalic midlines (5/1), LLQ drain (5/1 - 5/11)    ADVANCED DIRECTIVES:  Full Code    HCP/Emergency Contact-    INDICATION FOR SICU: hemodynamic instability requiring vasopressor support    DISPO:  Downgrade to floor. Discussed with Dr. Bethea.              For Follow-Up:  - 8pm labs        Patient signed out to Dr. Sudheer Greenberg from blue team on 5/13 at 15:23. SICU Transfer Note:    Transfer from: SICU  Transfer to:  ( X ) Surgery    (  ) Telemetry    (  ) Medicine    (  ) Palliative    (  ) Stroke Unit    (  ) _______________    HPI   57 y/o female BMI 42.4 PMH HTN, RAFA (not on home CPAP/BiPAP) GERD, pseudogout, migraines, renal stones, depression, asthma; PSH bladder repair, cholecystectomy. On 4/29/24, patient underwent an elective robotic-assisted hiatal herniorraphy w/ gastric bypass. On 4/30, patient was having abdominal pain and vomiting with a concern for possible aspiration. Patient underwent CT abdomen and pelvis showed a narrowed JJ anastomosis. On 4/31, patient was taken back to the OR for a diagnostic laparoscopy and found a narrowed jejunojejunal anastomosis, released and revised w/ negative leak test.    On 5/1, patient was found to by hypotensive and tachycardic on 4C by blue team. SICU consulted for resuscitation and hemodynamic instability. Patient was seen bedside on 4C. Patient is a/o x3, systolic BP in the 80s (improving to the 90s with fluid boluses), -110 sinus rhythm, SpO2 99% on NRFM at 15 LPM. Patient reports lower back pain and lower abdominal pain. Patient denies chest pain, dyspnea (although full inspiratory effort is limited by abdominal pain), nausea, vomiting, lightheadedness, dizziness.    Blue team bedside and says that current abdominal exam is her baseline and has not changed since post-op.        SICU Course:  Patient developed worsening hypoxic respiratory failure/ ARDS from severe aspiration pneumonia and was intubated on 5/5. While intubated, patient was paralyzed on nimbex from 5/5 - 5/7. Patient was then extubated on 5/10 to HFNC which was eventually weaned to nasal cannula today. Patient was also on vasopressors during SICU course, but was able to be weaned off after extubation and has remained off since 5/10. NG tube was removed 5/10 and CECE drain was removed 5/11. Patient's ARDS has been resolving and she is stable for downgrade to 4C.      PAST MEDICAL & SURGICAL HISTORY:  Asthma      Depression      GERD (gastroesophageal reflux disease)      Neck pain      Back pain      HTN (hypertension)      RAFA (obstructive sleep apnea)      Pseudogout      Renal stones      Migraines      Severe obesity (BMI >= 40)      History of cholecystectomy      H/O bladder repair surgery      Renal stones      H/O removal of cyst        Allergies    contrast media (gadolinium-based) (Rash)    Intolerances      MEDICATIONS  (STANDING):  acetaminophen   Oral Liquid .. 650 milliGRAM(s) Oral every 6 hours  amitriptyline 10 milliGRAM(s) Oral at bedtime  artificial  tears Solution 1 Drop(s) Both EYES two times a day  buPROPion XL (24-Hour) . 150 milliGRAM(s) Oral daily  chlorhexidine 2% Cloths 1 Application(s) Topical <User Schedule>  DULoxetine 60 milliGRAM(s) Oral every 12 hours  enoxaparin Injectable 40 milliGRAM(s) SubCutaneous every 12 hours  guaiFENesin  milliGRAM(s) Oral every 12 hours  lidocaine   4% Patch 1 Patch Transdermal daily  losartan 50 milliGRAM(s) Oral daily  melatonin 5 milliGRAM(s) Oral at bedtime  montelukast 10 milliGRAM(s) Oral at bedtime  oxycodone    5 mG/acetaminophen 325 mG 1 Tablet(s) Oral at bedtime  pantoprazole    Tablet 40 milliGRAM(s) Oral daily  piperacillin/tazobactam IVPB.. 4.5 Gram(s) IV Intermittent every 8 hours  spironolactone 50 milliGRAM(s) Oral daily    MEDICATIONS  (PRN):  albuterol    90 MICROgram(s) HFA Inhaler 2 Puff(s) Inhalation every 6 hours PRN Shortness of Breath and/or Wheezing  ondansetron Injectable 4 milliGRAM(s) IV Push every 8 hours PRN Nausea and/or Vomiting        Vital Signs Last 24 Hrs  T(C): 36.9 (13 May 2024 12:00), Max: 37.2 (13 May 2024 07:00)  T(F): 98.4 (13 May 2024 12:00), Max: 98.9 (13 May 2024 07:00)  HR: 85 (13 May 2024 13:00) (78 - 97)  BP: 152/82 (13 May 2024 13:00) (110/53 - 168/74)  BP(mean): 111 (13 May 2024 13:00) (76 - 111)  RR: 27 (13 May 2024 13:00) (23 - 32)  SpO2: 99% (13 May 2024 13:00) (91% - 99%)    Parameters below as of 13 May 2024 12:00  Patient On (Oxygen Delivery Method): nasal cannula  O2 Flow (L/min): 4    I&O's Summary    12 May 2024 07:01  -  13 May 2024 07:00  --------------------------------------------------------  IN: 1535 mL / OUT: 4200 mL / NET: -2665 mL    13 May 2024 07:01  -  13 May 2024 14:50  --------------------------------------------------------  IN: 150 mL / OUT: 600 mL / NET: -450 mL        LABS                                            10.0                  Neurophils% (auto):   x      (05-13 @ 00:16):    11.62)-----------(340          Lymphocytes% (auto):  x                                             32.4                   Eosinphils% (auto):   x        Manual%: Neutrophils x    ; Lymphocytes x    ; Eosinophils x    ; Bands%: x    ; Blasts x                                    139    |  106    |  7                   Calcium: 8.5   / iCa: x      (05-13 @ 00:16)    ----------------------------<  98        Magnesium: 2.0                              4.2     |  22     |  0.5              Phosphorous: 3.1        Assessment and Plan:	  58y Female s/p robotic-assisted hiatal herniorraphy w/ gastric bypass (4/29), diagnostic laparoscopy with released and revised JJ anastomosis narrowing w/ negative leak test (4/30). Hospital course complicated by aspiration pneumonia and ARDS requiring upgrade to SICU and intubation on 5/6. Now extubated on 5/10 tolerating HFNC.    NEUROLOGICAL:  #Acute pain- controlled  - acetaminophen   Oral Liquid .. 650 milliGRAM(s) Oral every 6 hours  - continue home oxycodone 7.5mg qHS   #Depression  - restarted home duloxetine  - restarted home wellbutrin  - restarted home amitriptyline    RESPIRATORY:   #Mechanical ventilation secondary to aspiration pneumonia with hypoxic respiratory failure  - resolving  - intubated 5/5 - 5/10, extubated 5/10, extubated after parameters were met  - 5/10 PM: Acute desaturation to SpO2 78% on 5L NC -> CPAP 40%/5 > transitioned to HFNC 40L/40%  - HFNC weaned to 4L NC  - sputum culture negative  - CXR stable  #Asthma  - albuterol PRN  - restarted home singulair    CARDIOVASCULAR:   #Acute hypotension secondary to sedation requirement - resolved  - off all vasopressors  # PMH of HTN  - BP controlled off home meds  - restarted home losartan and spironolactone  - Elevated troponemia --> downtrended, no longer trending  - cardiology recs appreciated  ECHO (5/6): EF 70%/G1DD/TR/severe pulmonary HTN    GASTROINTESTINAL/NUTRITION:   #s/p robotic-assisted hiatal herniorraphy w/ gastric bypass (4/29),   # s/p diagnostic laparoscopy with released and revised JJ anastomosis narrowing w/ negative leak test (4/30)  - Diet: Hari clears with Hari ensures  - LLQ CECE drain d/c'd   - NG tube - d/c'd 5/10  - aspiration precautions  - head of bed 30 degrees  #GI Prophylaxis  - protonix PO  #Bowel regimen  - Last BM 5/13    /RENAL:   #urine output in critically ill  - voiding  #IV locked  #intermittent diuresis  - restarted home spironolactone  #KIKA - resolved  - Non-oliguric    Labs:          BUN/Cr- 7/0.5  -->,  7/0.5  -->          Electrolytes-(05-13 @ 00:16)Na 139 // K 4.2 // Mg 2.0 // Phos 3.1     HEME/ONC:   #DVT prophylaxis  - Lovenox 40 q12  - SCDs    Labs: Hb/Hct:  9.8/32.8  -->,  10.0/32.4  -->                      Plts:  296  -->,  340  -->                 PTT/INR:     ID:   WBC- 15.67  --->>,  13.08  --->>,  11.62  --->>  Temp trend- 24hrs T(F): 98.4 (05-13 @ 12:00), Max: 98.9 (05-13 @ 07:00)  Antibiotics-piperacillin/tazobactam IVPB.. 4.5 every 8 hours (for aspiration pneumonia) (to end tomorrow)    Cultures:  - MRSA negative  - sputum culture Numerous Klebsiella pneumoniae and Normal Respiratory Keyla present prelim, gram stain final with few gpc, few gnr  - (5/7): Tracheal aspirate culture: negative  - repeat sputum cx- Few GPC in pairs, rare GPR  - no longer trending procal  -(5/10): sputum  c/x -normal resp keyla, no PMNs, few gram pos cocci    ENDOCRINOLOGY:  - Glucose goal 140-180    MSK:  Activity - increased as tolerated  PT re-consulted    LINES/DRAINS:  PIVs, bilateral cephalic midlines (5/1), LLQ drain (5/1 - 5/11)    ADVANCED DIRECTIVES:  Full Code    HCP/Emergency Contact-    INDICATION FOR SICU: hemodynamic instability requiring vasopressor support    DISPO:  Downgrade to floor. Discussed with Dr. Bethea.              For Follow-Up:  - 8pm labs        Patient signed out to Dr. Sudheer Greenberg from blue team on 5/13 at 15:23. SICU Transfer Note:    Transfer from: SICU  Transfer to:  ( X ) Surgery    (  ) Telemetry    (  ) Medicine    (  ) Palliative    (  ) Stroke Unit    (  ) _______________    HPI   59 y/o female BMI 42.4 PMH HTN, RAFA (not on home CPAP/BiPAP) GERD, pseudogout, migraines, renal stones, depression, asthma; PSH bladder repair, cholecystectomy. On 4/29/24, patient underwent an elective robotic-assisted hiatal herniorraphy w/ gastric bypass. On 4/30, patient was having abdominal pain and vomiting with a concern for possible aspiration. Patient underwent CT abdomen and pelvis showed a narrowed JJ anastomosis. On 4/31, patient was taken back to the OR for a diagnostic laparoscopy and found a narrowed jejunojejunal anastomosis, released and revised w/ negative leak test.    On 5/1, patient was found to by hypotensive and tachycardic on 4C by blue team. SICU consulted for resuscitation and hemodynamic instability. Patient was seen bedside on 4C. Patient is a/o x3, systolic BP in the 80s (improving to the 90s with fluid boluses), -110 sinus rhythm, SpO2 99% on NRFM at 15 LPM. Patient reports lower back pain and lower abdominal pain. Patient denies chest pain, dyspnea (although full inspiratory effort is limited by abdominal pain), nausea, vomiting, lightheadedness, dizziness.    Blue team bedside and says that current abdominal exam is her baseline and has not changed since post-op.        SICU Course:  Patient developed worsening hypoxic respiratory failure/ ARDS from severe aspiration pneumonia and was intubated on 5/5. While intubated, patient was paralyzed on nimbex from 5/5 - 5/7. Patient was then extubated on 5/10 to HFNC which was eventually weaned to nasal cannula today. Patient was also on vasopressors during SICU course, but was able to be weaned off after extubation and has remained off since 5/10. NG tube was removed 5/10 and CECE drain was removed 5/11. Patient's ARDS has been resolving and she is stable for downgrade to 4C.      PAST MEDICAL & SURGICAL HISTORY:  Asthma      Depression      GERD (gastroesophageal reflux disease)      Neck pain      Back pain      HTN (hypertension)      RAFA (obstructive sleep apnea)      Pseudogout      Renal stones      Migraines      Severe obesity (BMI >= 40)      History of cholecystectomy      H/O bladder repair surgery      Renal stones      H/O removal of cyst        Allergies    contrast media (gadolinium-based) (Rash)    Intolerances      MEDICATIONS  (STANDING):  acetaminophen   Oral Liquid .. 650 milliGRAM(s) Oral every 6 hours  amitriptyline 10 milliGRAM(s) Oral at bedtime  artificial  tears Solution 1 Drop(s) Both EYES two times a day  buPROPion XL (24-Hour) . 150 milliGRAM(s) Oral daily  chlorhexidine 2% Cloths 1 Application(s) Topical <User Schedule>  DULoxetine 60 milliGRAM(s) Oral every 12 hours  enoxaparin Injectable 40 milliGRAM(s) SubCutaneous every 12 hours  guaiFENesin  milliGRAM(s) Oral every 12 hours  lidocaine   4% Patch 1 Patch Transdermal daily  losartan 50 milliGRAM(s) Oral daily  melatonin 5 milliGRAM(s) Oral at bedtime  montelukast 10 milliGRAM(s) Oral at bedtime  oxycodone    5 mG/acetaminophen 325 mG 1 Tablet(s) Oral at bedtime  pantoprazole    Tablet 40 milliGRAM(s) Oral daily  piperacillin/tazobactam IVPB.. 4.5 Gram(s) IV Intermittent every 8 hours  spironolactone 50 milliGRAM(s) Oral daily    MEDICATIONS  (PRN):  albuterol    90 MICROgram(s) HFA Inhaler 2 Puff(s) Inhalation every 6 hours PRN Shortness of Breath and/or Wheezing  ondansetron Injectable 4 milliGRAM(s) IV Push every 8 hours PRN Nausea and/or Vomiting        Vital Signs Last 24 Hrs  T(C): 36.9 (13 May 2024 12:00), Max: 37.2 (13 May 2024 07:00)  T(F): 98.4 (13 May 2024 12:00), Max: 98.9 (13 May 2024 07:00)  HR: 85 (13 May 2024 13:00) (78 - 97)  BP: 152/82 (13 May 2024 13:00) (110/53 - 168/74)  BP(mean): 111 (13 May 2024 13:00) (76 - 111)  RR: 27 (13 May 2024 13:00) (23 - 32)  SpO2: 99% (13 May 2024 13:00) (91% - 99%)    Parameters below as of 13 May 2024 12:00  Patient On (Oxygen Delivery Method): nasal cannula  O2 Flow (L/min): 4    I&O's Summary    12 May 2024 07:01  -  13 May 2024 07:00  --------------------------------------------------------  IN: 1535 mL / OUT: 4200 mL / NET: -2665 mL    13 May 2024 07:01  -  13 May 2024 14:50  --------------------------------------------------------  IN: 150 mL / OUT: 600 mL / NET: -450 mL        LABS                                            10.0                  Neurophils% (auto):   x      (05-13 @ 00:16):    11.62)-----------(340          Lymphocytes% (auto):  x                                             32.4                   Eosinphils% (auto):   x        Manual%: Neutrophils x    ; Lymphocytes x    ; Eosinophils x    ; Bands%: x    ; Blasts x                                    139    |  106    |  7                   Calcium: 8.5   / iCa: x      (05-13 @ 00:16)    ----------------------------<  98        Magnesium: 2.0                              4.2     |  22     |  0.5              Phosphorous: 3.1        Assessment and Plan:	  58y Female s/p robotic-assisted hiatal herniorraphy w/ gastric bypass (4/29), diagnostic laparoscopy with released and revised JJ anastomosis narrowing w/ negative leak test (4/30). Hospital course complicated by aspiration pneumonia and ARDS requiring upgrade to SICU and intubation on 5/6. Extubated to HFNC on 5/10, now weaned to NC.    NEUROLOGICAL:  #Acute pain- controlled  - acetaminophen   Oral Liquid .. 650 milliGRAM(s) Oral every 6 hours  - continue home oxycodone 7.5mg qHS   #Depression  - restarted home duloxetine  - restarted home wellbutrin  - restarted home amitriptyline    RESPIRATORY:   #Mechanical ventilation secondary to aspiration pneumonia with hypoxic respiratory failure  - resolving  - intubated 5/5 - 5/10, extubated 5/10, extubated after parameters were met  - 5/10 PM: Acute desaturation to SpO2 78% on 5L NC -> CPAP 40%/5 > transitioned to HFNC 40L/40%  - HFNC weaned to 4L NC  - sputum culture negative  - CXR stable  #Asthma  - albuterol PRN  - restarted home singulair    CARDIOVASCULAR:   #Acute hypotension secondary to sedation requirement - resolved  - off all vasopressors  # PMH of HTN  - BP controlled off home meds  - restarted home losartan and spironolactone  - Elevated troponemia --> downtrended, no longer trending  - cardiology recs appreciated  ECHO (5/6): EF 70%/G1DD/TR/severe pulmonary HTN    GASTROINTESTINAL/NUTRITION:   #s/p robotic-assisted hiatal herniorraphy w/ gastric bypass (4/29),   # s/p diagnostic laparoscopy with released and revised JJ anastomosis narrowing w/ negative leak test (4/30)  - Diet: Hari clears with Hari ensures  - LLQ CECE drain d/c'd   - NG tube - d/c'd 5/10  - aspiration precautions  - head of bed 30 degrees  #GI Prophylaxis  - protonix PO  #Bowel regimen  - Last BM 5/13    /RENAL:   #urine output in critically ill  - voiding  #IV locked  #intermittent diuresis  - restarted home spironolactone  #KIKA - resolved  - Non-oliguric    Labs:          BUN/Cr- 7/0.5  -->,  7/0.5  -->          Electrolytes-(05-13 @ 00:16)Na 139 // K 4.2 // Mg 2.0 // Phos 3.1     HEME/ONC:   #DVT prophylaxis  - Lovenox 40 q12  - SCDs    Labs: Hb/Hct:  9.8/32.8  -->,  10.0/32.4  -->                      Plts:  296  -->,  340  -->                 PTT/INR:     ID:   WBC- 15.67  --->>,  13.08  --->>,  11.62  --->>  Temp trend- 24hrs T(F): 98.4 (05-13 @ 12:00), Max: 98.9 (05-13 @ 07:00)  Antibiotics-piperacillin/tazobactam IVPB.. 4.5 every 8 hours (for aspiration pneumonia) (to end tomorrow)    Cultures:  - MRSA negative  - sputum culture Numerous Klebsiella pneumoniae and Normal Respiratory Keyla present prelim, gram stain final with few gpc, few gnr  - (5/7): Tracheal aspirate culture: negative  - repeat sputum cx- Few GPC in pairs, rare GPR  - no longer trending procal  -(5/10): sputum  c/x -normal resp keyla, no PMNs, few gram pos cocci    ENDOCRINOLOGY:  - Glucose goal 140-180    MSK:  Activity - increased as tolerated  PT re-consulted    LINES/DRAINS:  PIVs, bilateral cephalic midlines (5/1), LLQ drain (5/1 - 5/11)    ADVANCED DIRECTIVES:  Full Code    HCP/Emergency Contact-    INDICATION FOR SICU: hemodynamic instability requiring vasopressor support    DISPO:  Downgrade to floor. Discussed with Dr. Bethea.              For Follow-Up:  - 8pm labs        Patient signed out to Dr. Sudheer Greenberg from blue team on 5/13 at 15:23.

## 2024-05-13 NOTE — PROGRESS NOTE ADULT - SUBJECTIVE AND OBJECTIVE BOX
GENERAL SURGERY PROGRESS NOTE    Patient: SRAAH BERGMAN , 58y ([birth year])Female   MRN: [id number]  Location: [address]  Visit: 04-29-24 Inpatient  Date: 05-13-24 @ 06:28    Admitted :04-29-24   HD: #15  POD #14 from gastric bypass, hiatal hernia repair  POD#12 from take back    PROCEDURE/DX/INJURIES:   4/12 - Gastric bypass, hiatal hernia repair   5/1 - Diagnostic laparoscopy, revision of hiatal hernia repair w/ mesh, intra-op endoscopy     Events of past 24 hours: Patient seen and examined at bedside. Afebrile, VSS. Off pressors. LLQ drain removed over the weekend. Patient with trouble sleeping overnight. was able to dangle legs at EOB. Tolerating joaquin CLD but not drinking much, still on IVF and ensures added. denies N/V. endorses G/BM   >>> <<<>>> <<<>>> <<<>>> <<<>>> <<<>>> <<<>>> <<<>>> <<<>>> <<<>>> <<<    Vitals:   T(F): 97.9 (05-13-24 @ 00:00), Max: 98.8 (05-12-24 @ 20:00)  HR: 92 (05-13-24 @ 03:00)  BP: 144/66 (05-13-24 @ 02:00)  RR: 26 (05-13-24 @ 03:00)  SpO2: 98% (05-13-24 @ 03:00)      PHYSICAL EXAM:  HEENT: Normocephalic, atraumatic, trachea midline  Heart: s1, s2,    Lungs: On high flow, Symmetric chest wall rise and fall. Bilateral breath sounds  Abdomen:  obese, Soft, nontender, nondistended. No rebound or guarding.   MSK/Extremities: Warm & well-perfused.   Skin: Warm, dry. No jaundice.   Incision/wound: healing well  >>> <<<>>> <<<>>> <<<>>> <<<>>> <<<>>> <<<>>> <<<>>> <<<>>> <<<>>> <<<    I & O's:  Diet, Clear Liquid:   Consistent Carbohydrate No Snacks (CSTCHO)  Bariatric Clear Liquid (BARICLLIQ)  Free Water Flush Instructions:  PATIENT CAN DRINK OWN FAIRLIFE PROTEIN SHARKE  Supplement Feeding Modality:  Oral  Ensure Plus High Protein Cans or Servings Per Day:  1       Frequency:  Two Times a day    Fluids: dextrose 5% + sodium chloride 0.45%.: Solution, 1000 milliLiter(s) infuse at 50 mL/Hr      05-11-24 @ 07:01  -  05-12-24 @ 07:00  --------------------------------------------------------  IN:    dextrose 5% + sodium chloride 0.45%: 1650 mL    IV PiggyBack: 166 mL    IV PiggyBack: 250 mL  Total IN: 2066 mL    OUT:    Indwelling Catheter - Urethral (mL): 1790 mL    Voided (mL): 500 mL  Total OUT: 2290 mL    Total NET: -224 mL    Bowel Movement: : +  Flatus: : +  >>> <<<>>> <<<>>> <<<>>> <<<>>> <<<>>> <<<>>> <<<>>> <<<>>> <<<>>> <<<    MEDICATIONS  (STANDING):  acetaminophen   Oral Liquid .. 650 milliGRAM(s) Oral every 6 hours  acetaZOLAMIDE    Tablet 125 milliGRAM(s) Oral every 24 hours  artificial  tears Solution 1 Drop(s) Both EYES two times a day  chlorhexidine 2% Cloths 1 Application(s) Topical <User Schedule>  dextrose 5% + sodium chloride 0.45%. 1000 milliLiter(s) (50 mL/Hr) IV Continuous <Continuous>  enoxaparin Injectable 40 milliGRAM(s) SubCutaneous every 12 hours  guaiFENesin  milliGRAM(s) Oral every 12 hours  lidocaine   4% Patch 1 Patch Transdermal daily  melatonin 5 milliGRAM(s) Oral at bedtime  pantoprazole    Tablet 40 milliGRAM(s) Oral daily  piperacillin/tazobactam IVPB.. 4.5 Gram(s) IV Intermittent every 8 hours    MEDICATIONS  (PRN):  albuterol    90 MICROgram(s) HFA Inhaler 2 Puff(s) Inhalation every 6 hours PRN Shortness of Breath and/or Wheezing  HYDROmorphone  Injectable 0.2 milliGRAM(s) IV Push every 12 hours PRN Moderate Pain (4 - 6)  HYDROmorphone  Injectable 0.5 milliGRAM(s) IV Push every 4 hours PRN Severe Pain (7 - 10)  ondansetron Injectable 4 milliGRAM(s) IV Push every 8 hours PRN Nausea and/or Vomiting    DVT PROPHYLAXIS: enoxaparin Injectable 40 milliGRAM(s) SubCutaneous every 12 hours    GI PROPHYLAXIS: pantoprazole    Tablet 40 milliGRAM(s) Oral daily    ANTIBIOTICS:  piperacillin/tazobactam IVPB.. 4.5 Gram(s)  >>> <<<>>> <<<>>> <<<>>> <<<>>> <<<>>> <<<>>> <<<>>> <<<>>> <<<>>> <<<    LAB/STUDIES:  CAPILLARY BLOOD GLUCOSE  POCT Blood Glucose.: 119 mg/dL (12 May 2024 12:08)                        10.0   11.62 )-----------( 340      ( 13 May 2024 00:16 )             32.4       05-13  139  |  106  |  7<L>  ----------------------------<  98  4.2   |  22  |  0.5<L>    Calcium: 8.5 mg/dL (05-13-24 @ 00:16)     Blood Gas Arterial, Lactate: 1.0 mmol/L (05-11-24 @ 21:32)  Blood Gas Arterial, Lactate: 0.7 mmol/L (05-11-24 @ 13:02)  Blood Gas Arterial, Lactate: 0.9 mmol/L (05-10-24 @ 15:17)  Blood Gas Arterial, Lactate: 1.4 mmol/L (05-10-24 @ 09:29)    ABG - ( 11 May 2024 21:32 )  pH: 7.51  /  pCO2: 34    /  pO2: 105   / HCO3: 27    / Base Excess: 4.3   /  SaO2: x         ABG - ( 11 May 2024 13:02 )  pH: 7.41  /  pCO2: 45    /  pO2: 73    / HCO3: 28    / Base Excess: 3.4   /  SaO2: 97.2      ABG - ( 10 May 2024 15:17 )  pH: 7.42  /  pCO2: 47    /  pO2: 86    / HCO3: 30    / Base Excess: 5.1   /  SaO2: 99.4      Urinalysis Basic - ( 13 May 2024 00:16 )  Color: x / Appearance: x / SG: x / pH: x  Gluc: 98 mg/dL / Ketone: x  / Bili: x / Urobili: x   Blood: x / Protein: x / Nitrite: x   Leuk Esterase: x / RBC: x / WBC x   Sq Epi: x / Non Sq Epi: x / Bacteria: x    Culture - Sputum (collected 10 May 2024 11:40)  Source: Trach Asp Tracheal Aspirate  Gram Stain (11 May 2024 03:40):    No polymorphonuclear leukocytes per low power field    Moderate Squamous epithelial cells per low power field    Few Gram positive cocci in pairs seen per oil power field    Rare Gram Positive Rods seen per oil power field  Final Report (12 May 2024 11:38):    Normal Respiratory Jennifer present  >>> <<<>>> <<<>>> <<<>>> <<<>>> <<<>>> <<<>>> <<<>>> <<<>>> <<<>>> <<<    IMAGING:  < from: Xray Chest 1 View- PORTABLE-Routine (05.12.24 @ 04:06) >  Impression:  Diffuse bilateral opacities without difference    --- End of Report ---  TETO DARLING MD; Attending Radiologist  This document has been electronically signed. May 12 2024 12:00PM  < end of copied text >

## 2024-05-13 NOTE — PROGRESS NOTE ADULT - SUBJECTIVE AND OBJECTIVE BOX
SARAH BERGMAN   [id number]   [birth year]  58yF    ============================   SICU Consult for hemodynamic instability requiring vasopressor support    HPI   57 y/o female BMI 42.4 PMH HTN, RAFA (not on home CPAP/BiPAP) GERD, pseudogout, migraines, renal stones, depression, asthma; PSH bladder repair, cholecystectomy. On 4/29/24, patient underwent an elective robotic-assisted hiatal herniorraphy w/ gastric bypass. On 4/30, patient was having abdominal pain and vomiting with a concern for possible aspiration. Patient underwent CT abdomen and pelvis showed a narrowed JJ anastomosis. On 4/31, patient was taken back to the OR for a diagnostic laparoscopy and found a narrowed jejunojejunal anastomosis, released and revised w/ negative leak test.    This morning, patient was found to by hypotensive and tachycardic on 4C by blue team. SICU consulted for resuscitation and hemodynamic instabililty. Patient was seen bedside on 4C. Patient is a/o x3, systolic BP in the 80s (improving to the 90s with fluid boluses), -110 sinus rhythm, SpO2 99% on NRFM at 15 LPM. Patient reports lower back pain and lower abdominal pain. Patient denies chest pain, dyspnea (although full inspiratory effort is limited by abdominal pain), nausea, vomiting, lightheadedness, dizziness.    Blue team bedside and says that current abdominal exam is her baseline and has not changed since post-op.      24 Hour Events:      5/12  PM  -PM rounds  -added mucinex - pt asking   -23:30 labs  -AM CXR  -AM ABG     Day  decreased IVF to 50  sputum c/x 5/10- normal resp kelya, no PMNs, few gram pos cocci  PT re-consulted  -5/7- sputum cx negative          [] A ten-point review of systems was otherwise negative except as noted above.  [ x ] Due to altered mental status/intubation, subjective information was not attained from the patient. History was obtained, to the extent possible, from review of the chart and collateral sources of information.  ================================================================== SARAH BERGMAN   [id number]   [birth year]  58yF    ============================   SICU Consult for hemodynamic instability requiring vasopressor support    HPI   57 y/o female BMI 42.4 PMH HTN, RAFA (not on home CPAP/BiPAP) GERD, pseudogout, migraines, renal stones, depression, asthma; PSH bladder repair, cholecystectomy. On 4/29/24, patient underwent an elective robotic-assisted hiatal herniorraphy w/ gastric bypass. On 4/30, patient was having abdominal pain and vomiting with a concern for possible aspiration. Patient underwent CT abdomen and pelvis showed a narrowed JJ anastomosis. On 4/31, patient was taken back to the OR for a diagnostic laparoscopy and found a narrowed jejunojejunal anastomosis, released and revised w/ negative leak test.    This morning, patient was found to by hypotensive and tachycardic on 4C by blue team. SICU consulted for resuscitation and hemodynamic instabililty. Patient was seen bedside on 4C. Patient is a/o x3, systolic BP in the 80s (improving to the 90s with fluid boluses), -110 sinus rhythm, SpO2 99% on NRFM at 15 LPM. Patient reports lower back pain and lower abdominal pain. Patient denies chest pain, dyspnea (although full inspiratory effort is limited by abdominal pain), nausea, vomiting, lightheadedness, dizziness.    Blue team bedside and says that current abdominal exam is her baseline and has not changed since post-op.      24 Hour Events:      5/12  PM  -PM rounds  -added mucinex - pt asking   -23:30 labs  -AM CXR  -AM ABG     Day  decreased IVF to 50  sputum c/x 5/10- normal resp jennifer, no PMNs, few gram pos cocci  PT re-consulted  -5/7- sputum cx negative          [] A ten-point review of systems was otherwise negative except as noted above.  [ x ] Due to altered mental status/intubation, subjective information was not attained from the patient. History was obtained, to the extent possible, from review of the chart and collateral sources of information.  ==================================================================    Daily     Daily     Diet, Clear Liquid:   Consistent Carbohydrate No Snacks (CSTCHO)  Bariatric Clear Liquid (BARICLLIQ)  Free Water Flush Instructions:  PATIENT CAN DRINK OWN FAIRLIFE PROTEIN SHARKE  Supplement Feeding Modality:  Oral  Ensure Plus High Protein Cans or Servings Per Day:  1       Frequency:  Two Times a day (05-12-24 @ 14:45)      CURRENT MEDS:  Neurologic Medications  acetaminophen   Oral Liquid .. 650 milliGRAM(s) Oral every 6 hours  HYDROmorphone  Injectable 0.2 milliGRAM(s) IV Push every 12 hours PRN Moderate Pain (4 - 6)  HYDROmorphone  Injectable 0.5 milliGRAM(s) IV Push every 4 hours PRN Severe Pain (7 - 10)  melatonin 5 milliGRAM(s) Oral at bedtime  ondansetron Injectable 4 milliGRAM(s) IV Push every 8 hours PRN Nausea and/or Vomiting    Respiratory Medications  albuterol    90 MICROgram(s) HFA Inhaler 2 Puff(s) Inhalation every 6 hours PRN Shortness of Breath and/or Wheezing  guaiFENesin  milliGRAM(s) Oral every 12 hours    Cardiovascular Medications  acetaZOLAMIDE    Tablet 125 milliGRAM(s) Oral every 24 hours    Gastrointestinal Medications  dextrose 5% + sodium chloride 0.45%. 1000 milliLiter(s) IV Continuous <Continuous>  pantoprazole    Tablet 40 milliGRAM(s) Oral daily    Genitourinary Medications    Hematologic/Oncologic Medications  enoxaparin Injectable 40 milliGRAM(s) SubCutaneous every 12 hours    Antimicrobial/Immunologic Medications  piperacillin/tazobactam IVPB.. 4.5 Gram(s) IV Intermittent every 8 hours    Endocrine/Metabolic Medications    Topical/Other Medications  artificial  tears Solution 1 Drop(s) Both EYES two times a day  chlorhexidine 2% Cloths 1 Application(s) Topical <User Schedule>  lidocaine   4% Patch 1 Patch Transdermal daily      ICU Vital Signs Last 24 Hrs  T(C): 36.7 (13 May 2024 04:00), Max: 37.1 (12 May 2024 20:00)  T(F): 98.1 (13 May 2024 04:00), Max: 98.8 (12 May 2024 20:00)  HR: 88 (13 May 2024 06:00) (86 - 97)  BP: 160/72 (13 May 2024 06:00) (110/53 - 162/71)  BP(mean): 103 (13 May 2024 06:00) (76 - 104)  ABP: --  ABP(mean): --  RR: 25 (13 May 2024 06:00) (23 - 31)  SpO2: 99% (13 May 2024 06:00) (96% - 99%)    O2 Parameters below as of 12 May 2024 20:01  Patient On (Oxygen Delivery Method): nasal cannula, high flow  O2 Flow (L/min): 40  O2 Concentration (%): 30        Adult Advanced Hemodynamics Last 24 Hrs  CVP(mm Hg): --  CVP(cm H2O): --  CO: --  CI: --  PA: --  PA(mean): --  PCWP: --  SVR: --  SVRI: --  PVR: --  PVRI: --      ABG - ( 11 May 2024 21:32 )  pH, Arterial: 7.51  pH, Blood: x     /  pCO2: 34    /  pO2: 105   / HCO3: 27    / Base Excess: 4.3   /  SaO2: x                   I&O's Summary    12 May 2024 07:01  -  13 May 2024 07:00  --------------------------------------------------------  IN: 1535 mL / OUT: 4100 mL / NET: -2565 mL      I&O's Detail    12 May 2024 07:01  -  13 May 2024 07:00  --------------------------------------------------------  IN:    dextrose 5% + sodium chloride 0.45%: 175 mL    dextrose 5% + sodium chloride 0.45%: 1050 mL    IV PiggyBack: 250 mL    Oral Fluid: 60 mL  Total IN: 1535 mL    OUT:    Voided (mL): 4100 mL  Total OUT: 4100 mL    Total NET: -2565 mL          PHYSICAL EXAM:    General/Neuro  GCS:  15   = E 4  / V 5  / M 6     Deficits:  alert & oriented x 3, no focal deficits, moving all bilateral extremities    Lungs:      clear to auscultation bilaterally, Normal expansion/effort.     Cardiovascular : S1, S2.  Regular rate and rhythm.  Trace peripheral edema bilaterally  Cardiac Rhythm: Normal Sinus Rhythm    GI: Abdomen soft, Non-tender, Non-distended.     Extremities: Extremities warm, pink, well-perfused.    Derm: Good skin turgor, no skin breakdown.        CXR:     LABS:  CAPILLARY BLOOD GLUCOSE      POCT Blood Glucose.: 119 mg/dL (12 May 2024 12:08)                          10.0   11.62 )-----------( 340      ( 13 May 2024 00:16 )             32.4       05-13    139  |  106  |  7<L>  ----------------------------<  98  4.2   |  22  |  0.5<L>    Ca    8.5      13 May 2024 00:16  Phos  3.1     05-13  Mg     2.0     05-13              Urinalysis Basic - ( 13 May 2024 00:16 )    Color: x / Appearance: x / SG: x / pH: x  Gluc: 98 mg/dL / Ketone: x  / Bili: x / Urobili: x   Blood: x / Protein: x / Nitrite: x   Leuk Esterase: x / RBC: x / WBC x   Sq Epi: x / Non Sq Epi: x / Bacteria: x        Culture - Sputum (collected 10 May 2024 11:40)  Source: Trach Asp Tracheal Aspirate  Gram Stain (11 May 2024 03:40):    No polymorphonuclear leukocytes per low power field    Moderate Squamous epithelial cells per low power field    Few Gram positive cocci in pairs seen per oil power field    Rare Gram Positive Rods seen per oil power field  Final Report (12 May 2024 11:38):    Normal Respiratory Jennifer present       SARAH BERGMAN   [id number]   [birth year]  58yF    ============================   SICU Consult for hemodynamic instability requiring vasopressor support    HPI   57 y/o female BMI 42.4 PMH HTN, RAFA (not on home CPAP/BiPAP) GERD, pseudogout, migraines, renal stones, depression, asthma; PSH bladder repair, cholecystectomy. On 4/29/24, patient underwent an elective robotic-assisted hiatal herniorraphy w/ gastric bypass. On 4/30, patient was having abdominal pain and vomiting with a concern for possible aspiration. Patient underwent CT abdomen and pelvis showed a narrowed JJ anastomosis. On 4/31, patient was taken back to the OR for a diagnostic laparoscopy and found a narrowed jejunojejunal anastomosis, released and revised w/ negative leak test.    This morning, patient was found to by hypotensive and tachycardic on 4C by blue team. SICU consulted for resuscitation and hemodynamic instabililty. Patient was seen bedside on 4C. Patient is a/o x3, systolic BP in the 80s (improving to the 90s with fluid boluses), -110 sinus rhythm, SpO2 99% on NRFM at 15 LPM. Patient reports lower back pain and lower abdominal pain. Patient denies chest pain, dyspnea (although full inspiratory effort is limited by abdominal pain), nausea, vomiting, lightheadedness, dizziness.    Blue team bedside and says that current abdominal exam is her baseline and has not changed since post-op.      24 Hour Events:       5/12  PM  -PM rounds  -added mucinex - pt asking   -23:30 labs  -AM CXR  -AM ABG     Day  decreased IVF to 50  sputum c/x 5/10- normal resp jennifer, no PMNs, few gram pos cocci  PT re-consulted  -5/7- sputum cx negative          [] A ten-point review of systems was otherwise negative except as noted above.  [ x ] Due to altered mental status/intubation, subjective information was not attained from the patient. History was obtained, to the extent possible, from review of the chart and collateral sources of information.  ==================================================================    Daily     Daily     Diet, Clear Liquid:   Consistent Carbohydrate No Snacks (CSTCHO)  Bariatric Clear Liquid (BARICLLIQ)  Free Water Flush Instructions:  PATIENT CAN DRINK OWN FAIRLIFE PROTEIN SHARKE  Supplement Feeding Modality:  Oral  Ensure Plus High Protein Cans or Servings Per Day:  1       Frequency:  Two Times a day (05-12-24 @ 14:45)      CURRENT MEDS:  Neurologic Medications  acetaminophen   Oral Liquid .. 650 milliGRAM(s) Oral every 6 hours  HYDROmorphone  Injectable 0.2 milliGRAM(s) IV Push every 12 hours PRN Moderate Pain (4 - 6)  HYDROmorphone  Injectable 0.5 milliGRAM(s) IV Push every 4 hours PRN Severe Pain (7 - 10)  melatonin 5 milliGRAM(s) Oral at bedtime  ondansetron Injectable 4 milliGRAM(s) IV Push every 8 hours PRN Nausea and/or Vomiting    Respiratory Medications  albuterol    90 MICROgram(s) HFA Inhaler 2 Puff(s) Inhalation every 6 hours PRN Shortness of Breath and/or Wheezing  guaiFENesin  milliGRAM(s) Oral every 12 hours    Cardiovascular Medications  acetaZOLAMIDE    Tablet 125 milliGRAM(s) Oral every 24 hours    Gastrointestinal Medications  dextrose 5% + sodium chloride 0.45%. 1000 milliLiter(s) IV Continuous <Continuous>  pantoprazole    Tablet 40 milliGRAM(s) Oral daily    Genitourinary Medications    Hematologic/Oncologic Medications  enoxaparin Injectable 40 milliGRAM(s) SubCutaneous every 12 hours    Antimicrobial/Immunologic Medications  piperacillin/tazobactam IVPB.. 4.5 Gram(s) IV Intermittent every 8 hours    Endocrine/Metabolic Medications    Topical/Other Medications  artificial  tears Solution 1 Drop(s) Both EYES two times a day  chlorhexidine 2% Cloths 1 Application(s) Topical <User Schedule>  lidocaine   4% Patch 1 Patch Transdermal daily      ICU Vital Signs Last 24 Hrs  T(C): 36.7 (13 May 2024 04:00), Max: 37.1 (12 May 2024 20:00)  T(F): 98.1 (13 May 2024 04:00), Max: 98.8 (12 May 2024 20:00)  HR: 88 (13 May 2024 06:00) (86 - 97)  BP: 160/72 (13 May 2024 06:00) (110/53 - 162/71)  BP(mean): 103 (13 May 2024 06:00) (76 - 104)  ABP: --  ABP(mean): --  RR: 25 (13 May 2024 06:00) (23 - 31)  SpO2: 99% (13 May 2024 06:00) (96% - 99%)    O2 Parameters below as of 12 May 2024 20:01  Patient On (Oxygen Delivery Method): nasal cannula, high flow  O2 Flow (L/min): 40  O2 Concentration (%): 30        Adult Advanced Hemodynamics Last 24 Hrs  CVP(mm Hg): --  CVP(cm H2O): --  CO: --  CI: --  PA: --  PA(mean): --  PCWP: --  SVR: --  SVRI: --  PVR: --  PVRI: --      ABG - ( 11 May 2024 21:32 )  pH, Arterial: 7.51  pH, Blood: x     /  pCO2: 34    /  pO2: 105   / HCO3: 27    / Base Excess: 4.3   /  SaO2: x                   I&O's Summary    12 May 2024 07:01  -  13 May 2024 07:00  --------------------------------------------------------  IN: 1535 mL / OUT: 4100 mL / NET: -2565 mL      I&O's Detail    12 May 2024 07:01  -  13 May 2024 07:00  --------------------------------------------------------  IN:    dextrose 5% + sodium chloride 0.45%: 175 mL    dextrose 5% + sodium chloride 0.45%: 1050 mL    IV PiggyBack: 250 mL    Oral Fluid: 60 mL  Total IN: 1535 mL    OUT:    Voided (mL): 4100 mL  Total OUT: 4100 mL    Total NET: -2565 mL          PHYSICAL EXAM:    General/Neuro  GCS:  15   = E 4  / V 5  / M 6     Deficits:  alert & oriented x 3, no focal deficits, moving all bilateral extremities    Lungs:      clear to auscultation bilaterally, Normal expansion/effort.     Cardiovascular : S1, S2.  Regular rate and rhythm.  Trace peripheral edema bilaterally  Cardiac Rhythm: Normal Sinus Rhythm    GI: Abdomen soft, Non-tender, Non-distended.     Extremities: Extremities warm, pink, well-perfused.    Derm: Good skin turgor, no skin breakdown.        CXR:     LABS:  CAPILLARY BLOOD GLUCOSE      POCT Blood Glucose.: 119 mg/dL (12 May 2024 12:08)                          10.0   11.62 )-----------( 340      ( 13 May 2024 00:16 )             32.4       05-13    139  |  106  |  7<L>  ----------------------------<  98  4.2   |  22  |  0.5<L>    Ca    8.5      13 May 2024 00:16  Phos  3.1     05-13  Mg     2.0     05-13              Urinalysis Basic - ( 13 May 2024 00:16 )    Color: x / Appearance: x / SG: x / pH: x  Gluc: 98 mg/dL / Ketone: x  / Bili: x / Urobili: x   Blood: x / Protein: x / Nitrite: x   Leuk Esterase: x / RBC: x / WBC x   Sq Epi: x / Non Sq Epi: x / Bacteria: x        Culture - Sputum (collected 10 May 2024 11:40)  Source: Trach Asp Tracheal Aspirate  Gram Stain (11 May 2024 03:40):    No polymorphonuclear leukocytes per low power field    Moderate Squamous epithelial cells per low power field    Few Gram positive cocci in pairs seen per oil power field    Rare Gram Positive Rods seen per oil power field  Final Report (12 May 2024 11:38):    Normal Respiratory Jennifer present

## 2024-05-14 ENCOUNTER — TRANSCRIPTION ENCOUNTER (OUTPATIENT)
Age: 59
End: 2024-05-14

## 2024-05-14 LAB
ANION GAP SERPL CALC-SCNC: 11 MMOL/L — SIGNIFICANT CHANGE UP (ref 7–14)
ANION GAP SERPL CALC-SCNC: 16 MMOL/L — HIGH (ref 7–14)
BASOPHILS # BLD AUTO: 0.04 K/UL — SIGNIFICANT CHANGE UP (ref 0–0.2)
BASOPHILS NFR BLD AUTO: 0.3 % — SIGNIFICANT CHANGE UP (ref 0–1)
BUN SERPL-MCNC: 8 MG/DL — LOW (ref 10–20)
BUN SERPL-MCNC: 8 MG/DL — LOW (ref 10–20)
CALCIUM SERPL-MCNC: 9 MG/DL — SIGNIFICANT CHANGE UP (ref 8.4–10.5)
CALCIUM SERPL-MCNC: 9 MG/DL — SIGNIFICANT CHANGE UP (ref 8.4–10.5)
CHLORIDE SERPL-SCNC: 102 MMOL/L — SIGNIFICANT CHANGE UP (ref 98–110)
CHLORIDE SERPL-SCNC: 106 MMOL/L — SIGNIFICANT CHANGE UP (ref 98–110)
CO2 SERPL-SCNC: 18 MMOL/L — SIGNIFICANT CHANGE UP (ref 17–32)
CO2 SERPL-SCNC: 20 MMOL/L — SIGNIFICANT CHANGE UP (ref 17–32)
CREAT SERPL-MCNC: 0.5 MG/DL — LOW (ref 0.7–1.5)
CREAT SERPL-MCNC: 0.5 MG/DL — LOW (ref 0.7–1.5)
EGFR: 109 ML/MIN/1.73M2 — SIGNIFICANT CHANGE UP
EGFR: 109 ML/MIN/1.73M2 — SIGNIFICANT CHANGE UP
EOSINOPHIL # BLD AUTO: 0.28 K/UL — SIGNIFICANT CHANGE UP (ref 0–0.7)
EOSINOPHIL NFR BLD AUTO: 2.3 % — SIGNIFICANT CHANGE UP (ref 0–8)
GLUCOSE SERPL-MCNC: 109 MG/DL — HIGH (ref 70–99)
GLUCOSE SERPL-MCNC: 76 MG/DL — SIGNIFICANT CHANGE UP (ref 70–99)
HCT VFR BLD CALC: 33.4 % — LOW (ref 37–47)
HCT VFR BLD CALC: 37.1 % — SIGNIFICANT CHANGE UP (ref 37–47)
HGB BLD-MCNC: 10.4 G/DL — LOW (ref 12–16)
HGB BLD-MCNC: 11.2 G/DL — LOW (ref 12–16)
IMM GRANULOCYTES NFR BLD AUTO: 0.9 % — HIGH (ref 0.1–0.3)
LYMPHOCYTES # BLD AUTO: 1.52 K/UL — SIGNIFICANT CHANGE UP (ref 1.2–3.4)
LYMPHOCYTES # BLD AUTO: 12.3 % — LOW (ref 20.5–51.1)
MAGNESIUM SERPL-MCNC: 2 MG/DL — SIGNIFICANT CHANGE UP (ref 1.8–2.4)
MAGNESIUM SERPL-MCNC: 2 MG/DL — SIGNIFICANT CHANGE UP (ref 1.8–2.4)
MCHC RBC-ENTMCNC: 26.1 PG — LOW (ref 27–31)
MCHC RBC-ENTMCNC: 26.7 PG — LOW (ref 27–31)
MCHC RBC-ENTMCNC: 30.2 G/DL — LOW (ref 32–37)
MCHC RBC-ENTMCNC: 31.1 G/DL — LOW (ref 32–37)
MCV RBC AUTO: 85.6 FL — SIGNIFICANT CHANGE UP (ref 81–99)
MCV RBC AUTO: 86.5 FL — SIGNIFICANT CHANGE UP (ref 81–99)
MONOCYTES # BLD AUTO: 0.86 K/UL — HIGH (ref 0.1–0.6)
MONOCYTES NFR BLD AUTO: 7 % — SIGNIFICANT CHANGE UP (ref 1.7–9.3)
NEUTROPHILS # BLD AUTO: 9.53 K/UL — HIGH (ref 1.4–6.5)
NEUTROPHILS NFR BLD AUTO: 77.2 % — HIGH (ref 42.2–75.2)
NRBC # BLD: 0 /100 WBCS — SIGNIFICANT CHANGE UP (ref 0–0)
NRBC # BLD: 0 /100 WBCS — SIGNIFICANT CHANGE UP (ref 0–0)
PHOSPHATE SERPL-MCNC: 2.9 MG/DL — SIGNIFICANT CHANGE UP (ref 2.1–4.9)
PHOSPHATE SERPL-MCNC: 3.2 MG/DL — SIGNIFICANT CHANGE UP (ref 2.1–4.9)
PLATELET # BLD AUTO: 355 K/UL — SIGNIFICANT CHANGE UP (ref 130–400)
PLATELET # BLD AUTO: 404 K/UL — HIGH (ref 130–400)
PMV BLD: 10.4 FL — SIGNIFICANT CHANGE UP (ref 7.4–10.4)
PMV BLD: 9.9 FL — SIGNIFICANT CHANGE UP (ref 7.4–10.4)
POTASSIUM SERPL-MCNC: 3.8 MMOL/L — SIGNIFICANT CHANGE UP (ref 3.5–5)
POTASSIUM SERPL-MCNC: 4.7 MMOL/L — SIGNIFICANT CHANGE UP (ref 3.5–5)
POTASSIUM SERPL-SCNC: 3.8 MMOL/L — SIGNIFICANT CHANGE UP (ref 3.5–5)
POTASSIUM SERPL-SCNC: 4.7 MMOL/L — SIGNIFICANT CHANGE UP (ref 3.5–5)
RBC # BLD: 3.9 M/UL — LOW (ref 4.2–5.4)
RBC # BLD: 4.29 M/UL — SIGNIFICANT CHANGE UP (ref 4.2–5.4)
RBC # FLD: 15.5 % — HIGH (ref 11.5–14.5)
RBC # FLD: 15.7 % — HIGH (ref 11.5–14.5)
SODIUM SERPL-SCNC: 136 MMOL/L — SIGNIFICANT CHANGE UP (ref 135–146)
SODIUM SERPL-SCNC: 137 MMOL/L — SIGNIFICANT CHANGE UP (ref 135–146)
WBC # BLD: 12.34 K/UL — HIGH (ref 4.8–10.8)
WBC # BLD: 12.55 K/UL — HIGH (ref 4.8–10.8)
WBC # FLD AUTO: 12.34 K/UL — HIGH (ref 4.8–10.8)
WBC # FLD AUTO: 12.55 K/UL — HIGH (ref 4.8–10.8)

## 2024-05-14 RX ORDER — OXYCODONE HYDROCHLORIDE 5 MG/1
5 TABLET ORAL ONCE
Refills: 0 | Status: COMPLETED | OUTPATIENT
Start: 2024-05-14 | End: 2024-05-15

## 2024-05-14 RX ORDER — POTASSIUM PHOSPHATE, MONOBASIC POTASSIUM PHOSPHATE, DIBASIC 236; 224 MG/ML; MG/ML
15 INJECTION, SOLUTION INTRAVENOUS ONCE
Refills: 0 | Status: COMPLETED | OUTPATIENT
Start: 2024-05-14 | End: 2024-05-14

## 2024-05-14 RX ORDER — POTASSIUM CHLORIDE 20 MEQ
20 PACKET (EA) ORAL ONCE
Refills: 0 | Status: DISCONTINUED | OUTPATIENT
Start: 2024-05-14 | End: 2024-05-14

## 2024-05-14 RX ADMIN — Medication 650 MILLIGRAM(S): at 05:36

## 2024-05-14 RX ADMIN — Medication 600 MILLIGRAM(S): at 17:08

## 2024-05-14 RX ADMIN — DULOXETINE HYDROCHLORIDE 60 MILLIGRAM(S): 30 CAPSULE, DELAYED RELEASE ORAL at 05:38

## 2024-05-14 RX ADMIN — POTASSIUM PHOSPHATE, MONOBASIC POTASSIUM PHOSPHATE, DIBASIC 63.75 MILLIMOLE(S): 236; 224 INJECTION, SOLUTION INTRAVENOUS at 13:20

## 2024-05-14 RX ADMIN — Medication 650 MILLIGRAM(S): at 00:28

## 2024-05-14 RX ADMIN — SPIRONOLACTONE 50 MILLIGRAM(S): 25 TABLET, FILM COATED ORAL at 05:37

## 2024-05-14 RX ADMIN — CHLORHEXIDINE GLUCONATE 1 APPLICATION(S): 213 SOLUTION TOPICAL at 05:39

## 2024-05-14 RX ADMIN — LIDOCAINE 1 PATCH: 4 CREAM TOPICAL at 12:57

## 2024-05-14 RX ADMIN — MONTELUKAST 10 MILLIGRAM(S): 4 TABLET, CHEWABLE ORAL at 21:51

## 2024-05-14 RX ADMIN — PANTOPRAZOLE SODIUM 40 MILLIGRAM(S): 20 TABLET, DELAYED RELEASE ORAL at 12:54

## 2024-05-14 RX ADMIN — PIPERACILLIN AND TAZOBACTAM 25 GRAM(S): 4; .5 INJECTION, POWDER, LYOPHILIZED, FOR SOLUTION INTRAVENOUS at 05:36

## 2024-05-14 RX ADMIN — Medication 10 MILLIGRAM(S): at 21:49

## 2024-05-14 RX ADMIN — DULOXETINE HYDROCHLORIDE 60 MILLIGRAM(S): 30 CAPSULE, DELAYED RELEASE ORAL at 17:09

## 2024-05-14 RX ADMIN — LIDOCAINE 1 PATCH: 4 CREAM TOPICAL at 12:55

## 2024-05-14 RX ADMIN — Medication 650 MILLIGRAM(S): at 12:54

## 2024-05-14 RX ADMIN — Medication 1 DROP(S): at 17:09

## 2024-05-14 RX ADMIN — BUPROPION HYDROCHLORIDE 150 MILLIGRAM(S): 150 TABLET, EXTENDED RELEASE ORAL at 12:54

## 2024-05-14 RX ADMIN — Medication 600 MILLIGRAM(S): at 05:38

## 2024-05-14 RX ADMIN — LOSARTAN POTASSIUM 50 MILLIGRAM(S): 100 TABLET, FILM COATED ORAL at 05:37

## 2024-05-14 RX ADMIN — Medication 1 DROP(S): at 05:36

## 2024-05-14 RX ADMIN — Medication 5 MILLIGRAM(S): at 21:50

## 2024-05-14 RX ADMIN — Medication 650 MILLIGRAM(S): at 17:08

## 2024-05-14 RX ADMIN — ENOXAPARIN SODIUM 40 MILLIGRAM(S): 100 INJECTION SUBCUTANEOUS at 05:36

## 2024-05-14 RX ADMIN — ENOXAPARIN SODIUM 40 MILLIGRAM(S): 100 INJECTION SUBCUTANEOUS at 17:10

## 2024-05-14 RX ADMIN — Medication 650 MILLIGRAM(S): at 21:49

## 2024-05-14 NOTE — PROGRESS NOTE ADULT - SUBJECTIVE AND OBJECTIVE BOX
GENERAL SURGERY PROGRESS NOTE     SARAH BERGMAN  [address] [place] [address] day :16d    POD:13d  Procedure: Creation, bypass, gastric, laparoscopic, robot-assisted    Robot-assisted hiatal herniorrhaphy using da Madhuri Xi    Creation, bypass, gastric, laparoscopic, robot-assisted    Diagnostic laparoscopy    Revision of anastomosis of small intestine    Laparoscopic lysis of abdominal adhesions    Upper endoscopy    Release of small bowel obstruction    Midline catheter insertion    Arterial cannulation, percutaneous, for monitoring    Ultrasound guided venous access      Surgical Attending: Enedelia Montoya  Overnight events: Pt was downgraded from the SICU to  med surg floor. She has been tolerating her Hari CLD and ensures, denies any nausea or vomiting. Pt reports passing gas and having a BM.    T(F): 98.2 (05-14-24 @ 00:34), Max: 98.9 (05-13-24 @ 07:00)  HR: 96 (05-14-24 @ 00:34) (78 - 96)  BP: 113/68 (05-14-24 @ 00:34) (113/68 - 182/101)  ABP: --  ABP(mean): --  RR: 18 (05-14-24 @ 00:34) (18 - 32)  SpO2: 98% (05-14-24 @ 00:34) (91% - 100%)    IN'S / OUT's:    05-12-24 @ 07:01  -  05-13-24 @ 07:00  --------------------------------------------------------  IN:    dextrose 5% + sodium chloride 0.45%: 175 mL    dextrose 5% + sodium chloride 0.45%: 1050 mL    IV PiggyBack: 250 mL    Oral Fluid: 60 mL  Total IN: 1535 mL    OUT:    Voided (mL): 4200 mL  Total OUT: 4200 mL    Total NET: -2665 mL      05-13-24 @ 07:01  -  05-14-24 @ 01:31  --------------------------------------------------------  IN:    dextrose 5% + sodium chloride 0.45%: 100 mL    IV PiggyBack: 50 mL  Total IN: 150 mL    OUT:    Voided (mL): 1100 mL  Total OUT: 1100 mL    Total NET: -950 mL          PHYSICAL EXAM:  GENERAL: NAD  CHEST/LUNG: equal chest rise b/l  HEART: Regular rate and rhythm  ABDOMEN: Soft, Nontender, Nondistended; Laparoscopic incisions with dermabond, no evidence of bleeding or drainage  EXTREMITIES:  No clubbing, cyanosis, or edema      LABS  Labs:  CAPILLARY BLOOD GLUCOSE                              10.0   11.62 )-----------( 340      ( 13 May 2024 00:16 )             32.4         05-13    139  |  106  |  7<L>  ----------------------------<  98  4.2   |  22  |  0.5<L>          LFTs:     Blood Gas Arterial, Lactate: 1.0 mmol/L (05-11-24 @ 21:32)  Blood Gas Arterial, Lactate: 0.7 mmol/L (05-11-24 @ 13:02)    ABG - ( 11 May 2024 21:32 )  pH: 7.51  /  pCO2: 34    /  pO2: 105   / HCO3: 27    / Base Excess: 4.3   /  SaO2: x               ABG - ( 11 May 2024 13:02 )  pH: 7.41  /  pCO2: 45    /  pO2: 73    / HCO3: 28    / Base Excess: 3.4   /  SaO2: 97.2            ABG - ( 10 May 2024 15:17 )  pH: 7.42  /  pCO2: 47    /  pO2: 86    / HCO3: 30    / Base Excess: 5.1   /  SaO2: 99.4              Coags:            Urinalysis Basic - ( 13 May 2024 00:16 )    Color: x / Appearance: x / SG: x / pH: x  Gluc: 98 mg/dL / Ketone: x  / Bili: x / Urobili: x   Blood: x / Protein: x / Nitrite: x   Leuk Esterase: x / RBC: x / WBC x   Sq Epi: x / Non Sq Epi: x / Bacteria: x            RADIOLOGY & ADDITIONAL TESTS:      A/P:  SARAH BERGMAN is a 58y F w/ PMHx of  HTN, RAFA (not on home CPAP/BiPAP), GERD, pseudogout, migraines, renal stones, h/o depression and anxiety, surgical history of bladder repair and cholecystectomy, who presented to [facility] on 4/[facility]/2024 for elective robotic-assisted hiatal herniorrhaphy w/ gastric bypass. Post-operative course c/b abdominal pain and emesis. CT abdomen/pelvis obtained which showed a narrowed JJ anastomosis. On 5/1, patient was taken back to the OR for a diagnostic laparoscopy, release and revision of jejunojejunal anastomosis w/ negative leak test. Concern for aspiration intra-operatively, patient found to be hypotensive and tachycardic post-op. Admitted to SICU for resuscitation and pressor needs post op. Pt eventually extubated and placed on HFNC. On 5/13 pt was downgraded to the floor.       PLAN:   - continue w/ Hari CLD and ensures  - continue to monitor respiratory status and hemodynamics   - Monitor for bowel function  - daily labs, replete electrolytes as needed   - home psych meds restarted   - DVT and GI ppx  - encourage ambulation and activity w/ PT  - Encourage IS as tolerated   - zosyn to end 5/14      #Antibiotics: piperacillin/tazobactam IVPB.. 4.5 Gram(s) IV Intermittent every 8 hours, 05-13-24 @ 09:21   Day **  #DVT ppx: enoxaparin Injectable 40 milliGRAM(s) SubCutaneous every 12 hours    #GI ppx: pantoprazole    Tablet 40 milliGRAM(s) Oral daily    Disposition:  4C    Above plan to be discussed with Attending Surgeon Dr. Montoya , patient, patient family, and rest of health care team    [employer] [id number]

## 2024-05-14 NOTE — PROGRESS NOTE ADULT - ATTENDING COMMENTS
On 4c now, wasn't able to sleep last night due to interruptions, she is restless this AM  On NC and vital signs are normal  Abd soft, nontender  c/o diarrhea, 4 episodes yesterday- will monitor  Drank some protein shake yesterday, if tolerates today will advance to pureed tomorrow
Weaning vent. Doing so much better, hoping to extubate soon per SICU, possibly tomorrow.   Awake and alert and calm on vent, able to "talk" and answer questions.   Abd soft, having diarrhea, and CECE serous  Appreciate SICU for mgmt
Pt doing very well- able to be weaned to 4L NC and tolerate clears and protein shakes  Still having diarrhea  Walking with minimal assistance and walker today  Transferred to floor, continue PT, continue weaning o2 as able, continue clear liquid diet and proetin shakes, possibly advance to pureed in the next few days
Pt examined  labs and chart reviewed  pt s/p lap RnY Gastric Byapss and then revsion of JEANNETTE  intiubated for aspiration Pneumonia and then extubated  was tolerating feed via ng tube  has started taking sips of joaquin clears  pulmnary - stats stil bob high flow CXR still shows infiltrates    continue icu care
Pt has above episode of hemodynamic decompensation as described above. Sent to SICU and responded to fluids but required nabil to maintain her MAP of 65. She was tachy, but slowly improved throughout the day.   She was no longer vomiting and had no more nausea. No flatus.   Her abdomen was soft and CECE ss.   Her lungs are an issue currently as she likely aspirated due to her emesis and is being managed by the ICU currently maintaining her airway on HFNC.  KIKA due to dehydration, resolving with fluids. Lactate slowly cleared during the day.
Pt seen multiple times throughout the day- she started having worsening nausea and then vomiting. Her pain also worsening over her LLQ.   She remained HD stable but her she started to vomit after drinking any small amount of liquid- this was concerning for early post-op bowel obstruction.   Due to the worsening symptoms a STAT CT was obtained that showed a likely jj stricture. She was consented for RTOR and taken to the OR.
 remains stable after intubation- did have an episode of dropping in her sats after rolling her to clean up a BM. She went up 0on pressors at that time but is now off. she is intubated/sedated and paralyzed. CXR with continue bilateral opacities.   Appreciate SICU for vent mgmt  Will place feeding tube into marcio limb with egd assistance today if possible to start enteral feeds.
58F with PMH of obesity (BMI 43), GERD, RAFA (CPAP), HTN, ASTHMA s/p gastric bypass and hiatal hernia repair now s/p RTOR for diagnostic lap with JJ anastomosis revision, JASON, and upper endoscopic release of SBO.    PLAN:   - NPO  - advance diet pending improvement in respiratory status   - IVF  - BiPAP per SICU
Critical Care: [id number]   This patient has a high probability of sudden, clinically significant deterioration, which requires the highest level of physician preparedness to intervene urgently. I managed/supervised life or organ supporting interventions that required frequent physician assessment. I devoted my full attention in the ICU to the direct care of this patient for the period of time indicated below. Time I spent with the family or surrogate(s) is included only if the patient was incapable of providing the necessary information or participating in medical decision making. Time devoted to teaching and to any procedures I billed separately is not included.     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 Patient was on BiPAP for oxygen requirement and gradually progressed to full respiratory failure due to hypoxia, intubated 5/5/24. Hemodynamically unstable on pressors. Electrolytes abnormalities, at risk for decline.  Patient is examined and evaluated at the bedside with SICU team. Treatment plan discussed with SICU team, nurses and primary team.   Chest X-ray and all relevant studies reviewed during rounds.  Will continue hemodynamic monitoring as per protocol in SICU.    Neuro:  GCS [11T ]   [x ]  Neurovascular checks as per SICU protocol                 [ ] 3% NaCl     [ ] 2% NaCl                [ ] Keppra  [ ] Lamictal  [ ] Depakote  [ ] Dilantin [x] None                Pharmacological Paralysis [ ] Yes  [x ]  No       Sedated/Pain control with                 [ ] Dilaudid drip, [ ]  Ketamine drip, [x ] Fentanyl drip, [ x] Propofol, [ ] Precedex, [ ] Versed drip, [ ] Ativan drip,                           [ ] OxyContin standing,  [ ] OxyContin PRN, [ ] Dilaudid PRN pushes, [ ] Fentanyl PRN pushes, [ ] PCA,                [ x] Tylenol IV/PO, [ ] Gabapentin, [ ]  Ketorolac, [ ] Tramadol,  [ ] Lidoderm Patch       Other Medications               [ ] Seroquel, [ ] Zyprexa, [ ] Haldol,  [ ] Clonazepam [ ] Xanax, [ ] Versed/Ativan PRN, [ ] Valium [x ] None               [ ] Robaxin   [ ] Baclofen  [ ] Flexeril               [ ] CIWA (Ativan/Valium/ Librium)  CV: continue to monitor, ECHO with contrast EF70% PAP 60  SVV 4.9 CI 2.3  On pressors [x ]  Yes  [ ]  No          [x ]  Levophed@0.06mcg, [x] Francesco-Synephrine, [ ] Vasopressin, [ ]  Epinephrine          [ ] Dobutamine, [ ] Milrinone, [ ]  Midodrine,  [ ] Others    Other Cardiac Meds          [ ] Amiodarone IV/PO, [ ] Digoxin, [ ] Cardizem drip, [ ] Cleviprex drip, [ ] Esmolol drip, [ ] Cardene drip  Respiratory: Acute respiratory insufficiency -> continue monitoring, CXR bilateral patchy infiltrates with bilateral pleural effusions -> both improving                         None Invasive Support  [ ] Incentive Spirometer                                  [ ] BiPAP   [ ] CPAP/NIV   [ ] HFNC   [ ] NR Face Mask  [ ] NC  [ ] Trach Caller [ ] Room Air                        Ventilatory support  [x ] Yes [ ] No                            [ ] SBT                                  [ ] PC    [ ] VC   [x ] AC/PRVC   [ ] BiVent/APRV   [ ]CPAP                         Vent 340/30/8/50%   ABG 7.4/49/91/30   LA 0.9  [x] PEAK pressures 26  GI  [x ]  bowel regiment none [x ] BM +  [x ] Flatus +  [ ] Ostomy   [x ] NG tube 500ml [x] Dignishild 200ml/24hrs [ x] CECE 65ml/24hrs               Prophylaxis [x ] PPI  [ ] H2 Blockers  [ ] Others  Nutrition: continue   [x ] Diet NPO   [ ] TPN/PPN   [ ] calories count   [x ]  Tube Feeds [ ] Dobhoff   Renal: Continue I&Os monitoring, Nunez catheter  [x ] Yes  [ ]  No  [ ] Consideration for discontinuation [ ] Taxes cath/PrimaFit  [ ] TOV                                                               [ ] HD/CVVH   [x ] Urine output 1300ml/24hrs       Lytes/Acid-base: replete hypokalemia, hypomagnesemia, hypocalcemia, hypophosphatemia [x] Creatinine 0.5->0.7->0.5       IV Fluids   [x ] LR @KVO [ ] NS  [ ] D5W1/2NS [ ] Bicarbonate Drip  [ ] Albumin [x ] Lasix push  [ ] Bumex drip/push  ID: leukocytosis -> continue to monitor: Febrile on 5/8/24 AM WBC 13->15.8->16.2  Pro-calcitonin 0.5        IV Abx [x ] Yes -> Zosyn, will add Vanco, [ ] No;  ID consulted [ ] Yes, [ x] No        Cultures send  [x ] Respiratory   [ ] Blood   [ ] Urine  [ ] Fluids  [ ] Tissue  [ ]  None  Lines:   [ ] Right   [ ] Left  [ ] Bilateral                     [ ] Subclavian TLC        [ ]  Internal Jugular TLC     [ ]  Femoral TLC                     [ ] Subclavian Cordis    [ ] Internal Jugular Cordis   [ ] Femoral Cordis                     [ ] HD catheter    [ ] PICC     [ x]  Midline   [x ] Peripheral IVs                                                 [ ] Right   [x ] Left   [ ] None                     [x ] Radial A-Line    [ ] Femoral A-Line   [ ] Axillary A-Line               Heme: continue to evaluate for acute blood loss anemia- trend Hg/Hct                     AC Reversal Indicated [ ] Yes  [ x] No                                      [ ] Kcentra,  [ ] 3Factors concentrate, [ ] Andexxa                     Transfused  indicated  [ ] Yes, [x ] No    [ ] PRBCs   [ ] Platelets   [ ] FFPs   [ ] Cryoprecipitate                    Should be started on or continued with  following  [ ] Yes,  [x ] No                               [ ] Lovenox  [ ] Coumadin  [ ] Heparin drip  [ ] DOVACs  [ ] ASA  [ ] Antiplatelets   Endocrine: Prevent and treat hyperglycemia with insulin as needed,                                [ x] Sliding scale,  [ ] Lantus/Regular Insuline                              Insuline drip for hyperglycemia [ ] Yes, [ x] No   PV: follow pulse exam  Skin: decub precautions  DVT Prophylaxis:  [x ] SCDs  [ ] Heparin SQ  [x] Lovenox  SQ  Stress Gastritis Prophylaxis: PPI/H2 Blockers if indicated  Mobility: patient is evaluated at the bedside with mobility team and the goals for today are discussed with PT [ x] bed rest due to respiratory status  PATIENT/FAMILY/SURROGATE CONFERENCE:  [x ] Yes with patient's family at the bedside. [ ] No  PURPOSE: To obtain necessary information, To discuss treatment options under consideration today.  I saw and evaluated the patient personally. I have reviewed and agree with note above. Treatment plan discussed with SICU team, nurses and primary team at the time of the multidisciplinary rounds. The above note is NOT written at the time of rounds and will reflect all changes throughout management of the patient for the day note is written for.    Mireya Tipton MD, FACS  Trauma/ACS/SCC Attending
Doing better overall but still in resp distress- alternating between CPAP and HFNC. Anxious and slight relief with hydroxizine.   +flatus and +bm  abd soft and min ttp, drain ss  Ok for clears as resp situation allows, ok for oral meds, appreciate SICU
Extubated today, stable on HFNC, able to phonate and calm/easy breathing  NJ tube removed and ok to start clears when ICU deems ok  Will remove CECE drain, abd soft  Work towards weaning o2 and working with PT to get oob
Once paralytic stopped she had an episode of anxiety and emesis. tube feeds held. this improved with sedation and she is currently stable.   tube feeds being held for now- if able to extubate will be able to start back diet vs hopefully will not need surgical feeding tube  appreciate SICU for vent mgmt and weaning as able.
This patient has a high probability of sudden, clinically significant deterioration, which requires the highest level of physician preparedness to intervene urgently. I managed/supervised life or organ supporting interventions that required frequent physician assessment. I devoted my full attention in the ICU to the direct care of this patient for the period of time indicated below. Time I spent with the family or surrogate(s) is included only if the patient was incapable of providing the necessary information or participating in medical decision making. Time devoted to teaching and to any procedures I billed separately is not included.     Patient examined and evaluated at the bedside with SICU team. I performed a medically appropriate exam. Pertinent findings are detailed below. Treatment plan discussed with SICU team, nurses and primary team. Vital signs, laboratory work, and imaging reviewed during rounds. Will continue hemodynamic monitoring as per protocol in SICU.    Neuro:  awake, alert, moving all extremities  #Postoperative Pain - tylenol 650mg PO Q6H, lidocaine patch 4% daily, d/c dilaudid, no NSAIDs  #Depression - start duloxetine, wellbutrin, amitriptyline (home meds)  #Sleeping difficulty - start 7.5mg oxycodone QHS (nightly home regimen)    Respiratory: respirations even and unlabored on 50L/40% HFNC  CXR reviewed and interpreted by me - diffuse bilateral opacifications  #ARDS - resolving, CXR still with bilateral opacifications, place on nasal cannula, encourage IS, deep breathing  #Asthma - start singulair 10mg QHS    CV: monitor hemodynamics, MAP goal > 65  #Hypertension - start losartan 50mg PO daily, stop diamox, start spironolactone 50mg PO daily   #Pulmonary hypertension - elevated RV Pressures on echo, stop D5 1/2 NS    GI: abd s/nt/nd, port sites healing well  #Nutrition - bariatric clears  #Ppx - protonix (s/p bariatric surgery)     Renal: Continue I&Os monitoring, replete electrolytes as needed  05-13    139  |  106  |  7<L>  ----------------------------<  98  4.2   |  22  |  0.5<L>    Ca 8.5; Mg 2.0; Phos 3.1    I/O - IN: 1535 mL / OUT: 4100 mL / NET: -2565 mL  IVF - D5 1/2 NS - HLIVF    Heme: continue to evaluate for acute blood loss anemia- trend Hg/Hct                         10.0   11.62 )-----------( 340      ( 13 May 2024 00:16 )             32.4     Anticoagulation - lovenox 40mg daily    ID: trend WBC, monitor for fevers  #Aspiration pneumonia - plan for 14 day total of zosyn, to end 5/14    Endocrine: Prevent and treat hyperglycemia with insulin as needed    PV: follow pulse exam    MSK: #Physical deconditioning - OOB and mobilize as able, PT eval    Skin: decub precautions    Lines: bilateral midlines  DVT Prophylaxis: lovenox  Stress Ulcer Prophylaxis: protonix  Disposition: SICU - respiratory insufficiency, possible downgrade to floor this afternoon if able to maintain O2 saturation on nasal cannula    Garrett Bethea MD  Trauma/Acute Care Surgery/Surgical Critical Care Attending
paralyzed and sedated- severe pulmonary hypertension on echo.   appreciate SICU for cardiac and pulmonary support and mgmt  NJ tube in place and ok for trickle tube feeds as tolerated  Weaning vent per ICU
 became increasingly more tachypneic and tired, she also has worsening mental status and the decision was made to intubate her. Intubation successful on first attempt by anesthesia with no drop in her sat. Vent per SICU.   started on paralytics, sedation and small dose of nabil.   Continues to have bowel ftn, drain ss, abd soft  Appreciate SICU for respiratory mgmt, consider endoscopic placement of og tomorrow at bedside for enteral feeds
Critical Care: [id number]   This patient has a high probability of sudden, clinically significant deterioration, which requires the highest level of physician preparedness to intervene urgently. I managed/supervised life or organ supporting interventions that required frequent physician assessment. I devoted my full attention in the ICU to the direct care of this patient for the period of time indicated below. Time I spent with the family or surrogate(s) is included only if the patient was incapable of providing the necessary information or participating in medical decision making. Time devoted to teaching and to any procedures I billed separately is not included.     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 Patient was on BiPAP for oxygen requirement and gradually progressed to full respiratory failure due to hypoxia, intubated 5/5/24. Hemodynamically unstable on pressors. Electrolytes abnormalities, at risk for decline.  Patient is examined and evaluated at the bedside with SICU team. Treatment plan discussed with SICU team, nurses and primary team.   Chest X-ray and all relevant studies reviewed during rounds.  Will continue hemodynamic monitoring as per protocol in SICU.    Neuro:  GCS [11T ]   [x ]  Neurovascular checks as per SICU protocol                 [ ] 3% NaCl     [ ] 2% NaCl                [ ] Keppra  [ ] Lamictal  [ ] Depakote  [ ] Dilantin [x] None                Pharmacological Paralysis [ ] Yes  [x ]  No       Sedated/Pain control with                 [ ] Dilaudid drip, [ ]  Ketamine drip, [x ] Fentanyl drip, [ x] Propofol, [ ] Precedex, [ ] Versed drip, [ ] Ativan drip,                           [ ] OxyContin standing,  [ ] OxyContin PRN, [ ] Dilaudid PRN pushes, [ ] Fentanyl PRN pushes, [ ] PCA,                [ x] Tylenol IV/PO, [ ] Gabapentin, [ ]  Ketorolac, [ ] Tramadol,  [ ] Lidoderm Patch       Other Medications               [ ] Seroquel, [ ] Zyprexa, [ ] Haldol,  [ ] Clonazepam [ ] Xanax, [ ] Versed/Ativan PRN, [ ] Valium [x ] None               [ ] Robaxin   [ ] Baclofen  [ ] Flexeril               [ ] CIWA (Ativan/Valium/ Librium)  CV: continue to monitor, ECHO with contrast EF70% PAP 60  SVV 4.9 CI 2.3  On pressors [x ]  Yes  [ ]  No          [x ]  Levophed@0.07mcg, [x] Francesco-Synephrine, [ ] Vasopressin, [ ]  Epinephrine          [ ] Dobutamine, [ ] Milrinone, [ ]  Midodrine,  [ ] Others    Other Cardiac Meds          [ ] Amiodarone IV/PO, [ ] Digoxin, [ ] Cardizem drip, [ ] Cleviprex drip, [ ] Esmolol drip, [ ] Cardene drip  Respiratory: Acute respiratory insufficiency -> continue monitoring, CXR bilateral patchy infiltrates with bilateral pleural effusions -> both improving                         None Invasive Support  [ ] Incentive Spirometer                                  [ ] BiPAP   [ ] CPAP/NIV   [ ] HFNC   [ ] NR Face Mask  [ ] NC  [ ] Trach Caller [ ] Room Air                        Ventilatory support  [x ] Yes [ ] No                            [ ] SBT                                  [ ] PC    [ ] VC   [x ] AC/PRVC   [ ] BiVent/APRV   [ ]CPAP                         Vent 340/30/10/50%   ABG 7.37/53/109/31   LA 1.0  [x] PEAK pressures 28  GI  [x ]  bowel regiment none [x ] BM +  [x ] Flatus +  [ ] Ostomy   [x ] NG tube ml [x] Dignishild placed on 5/7/24  [ x] CECE 70ml/24hrs               Prophylaxis [x ] PPI  [ ] H2 Blockers  [ ] Others  Nutrition: continue   [x ] Diet NPO   [ ] TPN/PPN   [ ] calories count   [x ]  Tube Feeds [ ] Dobhoff   Renal: Continue I&Os monitoring, Nunez catheter  [x ] Yes  [ ]  No  [ ] Consideration for discontinuation [ ] Taxes cath/PrimaFit  [ ] TOV                                                               [ ] HD/CVVH   [x ] Urine output 1500ml/24hrs       Lytes/Acid-base: replete hypokalemia, hypomagnesemia, hypocalcemia, hypophosphatemia [x] Creatinine 0.5->0.7->0.5       IV Fluids   [x ] LR @KVO [ ] NS  [ ] D5W1/2NS [ ] Bicarbonate Drip  [ ] Albumin [x ] Lasix push  [ ] Bumex drip/push  ID: leukocytosis -> continue to monitor: Febrile on 5/8/24 AM WBC 13->15.8  Pro-calcitonin 0.5        IV Abx [x ] Yes -> Zosyn, will add Vanco, [ ] No;  ID consulted [ ] Yes, [ x] No        Cultures send  [x ] Respiratory   [ ] Blood   [ ] Urine  [ ] Fluids  [ ] Tissue  [ ]  None  Lines:   [ ] Right   [ ] Left  [ ] Bilateral                     [ ] Subclavian TLC        [ ]  Internal Jugular TLC     [ ]  Femoral TLC                     [ ] Subclavian Cordis    [ ] Internal Jugular Cordis   [ ] Femoral Cordis                     [ ] HD catheter    [ ] PICC     [ x]  Midline   [x ] Peripheral IVs                                                 [ ] Right   [x ] Left   [ ] None                     [x ] Radial A-Line    [ ] Femoral A-Line   [ ] Axillary A-Line               Heme: continue to evaluate for acute blood loss anemia- trend Hg/Hct                     AC Reversal Indicated [ ] Yes  [ x] No                                      [ ] Kcentra,  [ ] 3Factors concentrate, [ ] Andexxa                     Transfused  indicated  [ ] Yes, [x ] No    [ ] PRBCs   [ ] Platelets   [ ] FFPs   [ ] Cryoprecipitate                    Should be started on or continued with  following  [ ] Yes,  [x ] No                               [ ] Lovenox  [ ] Coumadin  [ ] Heparin drip  [ ] DOVACs  [ ] ASA  [ ] Antiplatelets   Endocrine: Prevent and treat hyperglycemia with insulin as needed,                                [ x] Sliding scale,  [ ] Lantus/Regular Insuline                              Insuline drip for hyperglycemia [ ] Yes, [ x] No   PV: follow pulse exam  Skin: decub precautions  DVT Prophylaxis:  [x ] SCDs  [ ] Heparin SQ  [x] Lovenox  SQ  Stress Gastritis Prophylaxis: PPI/H2 Blockers if indicated  Mobility: patient is evaluated at the bedside with mobility team and the goals for today are discussed with PT [ x] bed rest due to respiratory status  PATIENT/FAMILY/SURROGATE CONFERENCE:  [x ] Yes with patient's family at the bedside. [ ] No  PURPOSE: To obtain necessary information, To discuss treatment options under consideration today.  I saw and evaluated the patient personally. I have reviewed and agree with note above. Treatment plan discussed with SICU team, nurses and primary team at the time of the multidisciplinary rounds. The above note is NOT written at the time of rounds and will reflect all changes throughout management of the patient for the day note is written for.    Mireya Tipton MD, FACS  Trauma/ACS/SCC Attending
Critical Care: [id number]   This patient has a high probability of sudden, clinically significant deterioration, which requires the highest level of physician preparedness to intervene urgently. I managed/supervised life or organ supporting interventions that required frequent physician assessment. I devoted my full attention in the ICU to the direct care of this patient for the period of time indicated below. Time I spent with the family or surrogate(s) is included only if the patient was incapable of providing the necessary information or participating in medical decision making. Time devoted to teaching and to any procedures I billed separately is not included.     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 Patient was on BiPAP for oxygen requirement and gradually progressed to full respiratory failure due to hypoxia, intubated 5/5/24. Hemodynamically unstable on pressors. Electrolytes abnormalities, at risk for decline.  Patient is examined and evaluated at the bedside with SICU team. Treatment plan discussed with SICU team, nurses and primary team.   Chest X-ray and all relevant studies reviewed during rounds.  Will continue hemodynamic monitoring as per protocol in SICU.    Neuro:  GCS [11T ]   [x ]  Neurovascular checks as per SICU protocol                 [ ] 3% NaCl     [ ] 2% NaCl                [ ] Keppra  [ ] Lamictal  [ ] Depakote  [ ] Dilantin                Pharmacological Paralysis [ ] Yes  [x ]  No       Sedated/Pain control with                 [ ] Dilaudid drip, [ ]  Ketamine drip, [x ] Fentanyl drip, [ x] Propofol, [x ] Precedex, [ ] Versed drip, [ ] Ativan drip,                           [ ] OxyContin standing,  [ ] OxyContin PRN, [ ] Dilaudid PRN pushes, [ ] Fentanyl PRN pushes, [ ] PCA,                [ x] Tylenol IV/PO, [ ] Gabapentin, [ ]  Ketorolac, [ ] Tramadol,  [ ] Lidoderm Patch       Other Medications               [ ] Seroquel, [ ] Zyprexa, [ ] Haldol,  [ ] Clonazepam [ ] Xanax, [ ] Versed/Ativan PRN, [ ] Valium [ ] None               [ ] Robaxin   [ ] Baclofen  [ ] Flexeril               [ ] CIWA (Ativan/Valium/ Librium)  CV: continue to monitor, received 500 ml boluses overnight, 100ml Albumin, ECHO with contrast EF70% PAP 60  SVV 4.9 CI 2.3  On pressors [x ]  Yes  [ ]  No          [ ]  Levophed, [x] Francesco-Synephrine@0.5mcg, [ ] Vasopressin, [ ]  Epinephrine          [ ] Dobutamine, [ ] Milrinone, [ ]  Midodrine,  [ ] Others    Other Cardiac Meds          [ ] Amiodarone IV/PO, [ ] Digoxin, [ ] Cardizem drip, [ ] Cleviprex drip, [ ] Esmolol drip, [ ] Cardene drip  Respiratory: Acute respiratory insufficiency -> continue monitoring, CXR bilateral patchy infiltrates with bilateral pleural effusions -> both improving                         None Invasive Support  [ ] Incentive Spirometer                                  [ ] BiPAP   [ ] CPAP/NIV   [ ] HFNC   [ ] NR Face Mask  [ ] NC  [ ] Trach Caller [ ] Room Air                        Ventilatory support  [x ] Yes [ ] No                            [ ] SBT                                  [ ] PC    [ ] VC   [x ] AC/PRVC   [ ] BiVent/APRV   [ ]CPAP                         Vent 340/30/10/60%   ABG 7.41/50/118/32   LA 1.1  [x] PEAK pressures 28  GI  [x ]  bowel regiment none [x ] BM +  [x ] Flatus +  [ ] Ostomy   [x ] NG tube 400ml [x] Dignishild placed on 5/7/24  [ x] CECE 70ml/24hrs               Prophylaxis [x ] PPI  [ ] H2 Blockers  [ ] Others  Nutrition: continue   [x ] Diet NPO   [ ] TPN/PPN   [ ] calories count   [x ]  Tube Feeds [ ] Dobhoff   Renal: Continue I&Os monitoring, Nunez catheter  [x ] Yes  [ ]  No  [ ] Consideration for discontinuation [ ] Taxes cath/PrimaFit  [ ] TOV                                                               [ ] HD/CVVH   [x ] Urine output 1800ml/24hrs       Lytes/Acid-base: replete hypokalemia, hypomagnesemia, hypocalcemia, hypophosphatemia [x] Creatinine 0.5->0.7       IV Fluids   [x ] LR @KVO [ ] NS  [ ] D5W1/2NS [ ] Bicarbonate Drip  [ ] Albumin [x ] Lasix push  [ ] Bumex drip/push  ID: leukocytosis -> continue to monitor: Febrile on 5/8/24 AM WBC 13        IV Abx [x ] Yes -> Zosyn 5/8/24 end date, [ ] No;  ID consulted [ ] Yes, [ x] No        Cultures send  [x ] Respiratory   [ ] Blood   [ ] Urine  [ ] Fluids  [ ] Tissue  [ ]  None  Lines:   [ ] Right   [ ] Left  [ ] Bilateral                     [ ] Subclavian TLC        [ ]  Internal Jugular TLC     [ ]  Femoral TLC                     [ ] Subclavian Cordis    [ ] Internal Jugular Cordis   [ ] Femoral Cordis                     [ ] HD catheter    [ ] PICC     [ x]  Midline   [x ] Peripheral IVs                                                 [x ] Right   [ ] Left   [ ] None                     [x ] Radial A-Line    [ ] Femoral A-Line   [ ] Axillary A-Line               Heme: continue to evaluate for acute blood loss anemia- trend Hg/Hct                     AC Reversal Indicated [ ] Yes  [ x] No                                      [ ] Kcentra,  [ ] 3Factors concentrate, [ ] Andexxa                     Transfused  indicated  [ ] Yes, [x ] No    [ ] PRBCs   [ ] Platelets   [ ] FFPs   [ ] Cryoprecipitate                    Should be started on or continued with  following  [ ] Yes,  [x ] No                               [ ] Lovenox  [ ] Coumadin  [ ] Heparin drip  [ ] DOVACs  [ ] ASA  [ ] Antiplatelets   Endocrine: Prevent and treat hyperglycemia with insulin as needed,                                [ x] Sliding scale,  [ ] Lantus/Regular Insuline                              Insuline drip for hyperglycemia [ ] Yes, [ x] No   PV: follow pulse exam  Skin: decub precautions  DVT Prophylaxis:  [x ] SCDs  [ ] Heparin SQ  [x] Lovenox  SQ  Stress Gastritis Prophylaxis: PPI/H2 Blockers if indicated  Mobility: patient is evaluated at the bedside with mobility team and the goals for today are discussed with PT [ x] bed rest due to respiratory status  PATIENT/FAMILY/SURROGATE CONFERENCE:  [x ] Yes with patient's family at the bedside. [ ] No  PURPOSE: To obtain necessary information, To discuss treatment options under consideration today.  I saw and evaluated the patient personally. I have reviewed and agree with note above. Treatment plan discussed with SICU team, nurses and primary team at the time of the multidisciplinary rounds. The above note is NOT written at the time of rounds and will reflect all changes throughout management of the patient for the day note is written for.    Mireya Tipton MD, FACS  Trauma/ACS/SCC Attending
Critical Care: [id number]   This patient has a high probability of sudden, clinically significant deterioration, which requires the highest level of physician preparedness to intervene urgently. I managed/supervised life or organ supporting interventions that required frequent physician assessment. I devoted my full attention in the ICU to the direct care of this patient for the period of time indicated below. Time I spent with the family or surrogate(s) is included only if the patient was incapable of providing the necessary information or participating in medical decision making. Time devoted to teaching and to any procedures I billed separately is not included.     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 Patient was on BiPAP for oxygen requirement and gradually progressed to full respiratory failure due to hypoxia, intubated 5/5/24. Hemodynamically unstable on pressors. Electrolytes abnormalities, at risk for decline.  Patient is examined and evaluated at the bedside with SICU team. Treatment plan discussed with SICU team, nurses and primary team.   Chest X-ray and all relevant studies reviewed during rounds.  Will continue hemodynamic monitoring as per protocol in SICU.    Neuro:  GCS [15 ] AAOx3  [x ]  Neurovascular checks as per SICU protocol                 [ ] 3% NaCl     [ ] 2% NaCl                [ ] Keppra  [ ] Lamictal  [ ] Depakote  [ ] Dilantin [x] None                Pharmacological Paralysis [ ] Yes  [x ]  No       Sedated/Pain control with                 [ ] Dilaudid drip, [ ]  Ketamine drip, [x ] Fentanyl drip, [ x] Propofol, [ ] Precedex, [ ] Versed drip, [ ] Ativan drip,                           [ ] OxyContin standing,  [ ] OxyContin PRN, [x ] Dilaudid PRN pushes, [ ] Fentanyl PRN pushes, [ ] PCA,                [ x] Tylenol IV/PO, [ ] Gabapentin, [ ]  Ketorolac, [ ] Tramadol,  [ ] Lidoderm Patch       Other Medications               [ ] Seroquel, [ ] Zyprexa, [ ] Haldol,  [ ] Clonazepam [ ] Xanax, [ ] Versed/Ativan PRN, [ ] Valium [x ] None               [ ] Robaxin   [ ] Baclofen  [ ] Flexeril               [ ] CIWA (Ativan/Valium/ Librium)  CV: continue to monitor, ECHO with contrast EF70% PAP 60  On pressors [ ]  Yes  [x ]  No          [ ]  Levophed, [ ] Francesco-Synephrine, [ ] Vasopressin, [ ]  Epinephrine          [ ] Dobutamine, [ ] Milrinone, [ ]  Midodrine,  [ ] Others    Other Cardiac Meds          [ ] Amiodarone IV/PO, [ ] Digoxin, [ ] Cardizem drip, [ ] Cleviprex drip, [ ] Esmolol drip, [ ] Cardene drip  Respiratory: Acute respiratory insufficiency -> continue monitoring, CXR bilateral patchy infiltrates with poor inspiratory efforts                         None Invasive Support  [ x] Incentive Spirometer                                  [ ] BiPAP   [ ] CPAP/NIV   [ x] HFNC 40/40  [ ] NR Face Mask  [ ] NC  [ ] Trach Caller [ ] Room Air                        Ventilatory support  [ ] Yes [ x] No                            [ ] SBT                                  [ ] PC    [ ] VC   [ ] AC/PRVC   [ ] BiVent/APRV   [ ]CPAP                        ABG 7.51/34/105/27     LA 0.9   GI  [x ]  bowel regiment none [x ] BM +  [x ] Flatus +  [ ] Ostomy   [ ] NG tube [ ] Dignishild  [ x] CECE 90ml/24hrs               Prophylaxis [x ] PPI  [ ] H2 Blockers  [ ] Others  Nutrition: continue   [x ] Diet Bariatric clears   [ ] TPN/PPN   [ ] calories count   [ ]  Tube Feeds [ ] Dobhoff   Renal: Continue I&Os monitoring, Nunez catheter  [ ] Yes  [x ]  No  [ ] Consideration for discontinuation [ ] Taxes cath/PrimaFit  [x ] TOV                                                               [ ] HD/CVVH   [x ] Urine output 1800ml/24hrs       Lytes/Acid-base: replete hypokalemia, hypomagnesemia, hypocalcemia, hypophosphatemia [x] Creatinine 0.5->0.7->0.5       IV Fluids   [ ] LR  [ ] NS  [ x] D5W1/2NS@50ml/hr [ ] Bicarbonate Drip  [ ] Albumin [x ] Lasix push  [ ] Bumex drip/push  ID: leukocytosis -> continue to monitor: Febrile on 5/8/24 AM WBC 13->15.8->16.2 ->15 ->13 Pro-calcitonin 0.5        IV Abx [x ] Yes -> Zosyn, [ ] No;  ID consulted [ ] Yes, [ x] No        Cultures send  [x ] Respiratory   [ ] Blood   [ ] Urine  [ ] Fluids  [ ] Tissue  [ ]  None  Lines:   [ ] Right   [ ] Left  [ ] Bilateral                     [ ] Subclavian TLC        [ ]  Internal Jugular TLC     [ ]  Femoral TLC                     [ ] Subclavian Cordis    [ ] Internal Jugular Cordis   [ ] Femoral Cordis                     [ ] HD catheter    [ ] PICC     [ x]  Midline   [x ] Peripheral IVs                                                 [ ] Right   [ ] Left   [ x] None                     [ ] Radial A-Line    [ ] Femoral A-Line   [ ] Axillary A-Line               Heme: continue to evaluate for acute blood loss anemia- trend Hg/Hct                     AC Reversal Indicated [ ] Yes  [ x] No                                      [ ] Kcentra,  [ ] 3Factors concentrate, [ ] Andexxa                     Transfused  indicated  [ ] Yes, [x ] No    [ ] PRBCs   [ ] Platelets   [ ] FFPs   [ ] Cryoprecipitate                    Should be started on or continued with  following  [ ] Yes,  [x ] No                               [ ] Lovenox  [ ] Coumadin  [ ] Heparin drip  [ ] DOVACs  [ ] ASA  [ ] Antiplatelets   Endocrine: Prevent and treat hyperglycemia with insulin as needed,                                [ x] Sliding scale,  [ ] Lantus/Regular Insuline                              Insuline drip for hyperglycemia [ ] Yes, [ x] No   PV: follow pulse exam  Skin: decub precautions  DVT Prophylaxis:  [x ] SCDs  [ ] Heparin SQ  [x] Lovenox  SQ  Stress Gastritis Prophylaxis: PPI/H2 Blockers if indicated  Mobility: patient is evaluated at the bedside with mobility team and the goals for today are discussed with PT [ x] out of bed to chair  PATIENT/FAMILY/SURROGATE CONFERENCE:  [x ] Yes with patient's family at the bedside. [ ] No  PURPOSE: To obtain necessary information, To discuss treatment options under consideration today.  I saw and evaluated the patient personally. I have reviewed and agree with note above. Treatment plan discussed with SICU team, nurses and primary team at the time of the multidisciplinary rounds. The above note is NOT written at the time of rounds and will reflect all changes throughout management of the patient for the day note is written for.    Mireya Tipton MD, FACS  Trauma/ACS/SCC Attending
Lungs remain biggest issue- ok for CPAP.   Otherwise improving- almost off of nabil, BP and HR improved, Cr normalized. Lactate normalized. Trops normalizing.   Passed flatus today- once resp issues resolve ok to start joaquin clears back.
Critical Care: [id number]   This patient has a high probability of sudden, clinically significant deterioration, which requires the highest level of physician preparedness to intervene urgently. I managed/supervised life or organ supporting interventions that required frequent physician assessment. I devoted my full attention in the ICU to the direct care of this patient for the period of time indicated below. Time I spent with the family or surrogate(s) is included only if the patient was incapable of providing the necessary information or participating in medical decision making. Time devoted to teaching and to any procedures I billed separately is not included.     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 Patient was on BiPAP for oxygen requirement and gradually progressed to full respiratory failure due to hypoxia, intubated 5/5/24. Hemodynamically unstable on pressors. Electrolytes abnormalities, at risk for decline.  Patient is examined and evaluated at the bedside with SICU team. Treatment plan discussed with SICU team, nurses and primary team.   Chest X-ray and all relevant studies reviewed during rounds.  Will continue hemodynamic monitoring as per protocol in SICU.    Neuro:  GCS [3t ]   [ ]  Neurovascular checks as per SICU protocol                 [ ] 3% NaCl     [ ] 2% NaCl                [ ] Keppra  [ ] Lamictal  [ ] Depakote  [ ] Dilantin                Pharmacological Paralysis [x ] Yes  [ ]  No BIS 34-37      Sedated/Pain control with                 [ ] Dilaudid drip, [ ]  Ketamine drip, [x ] Fentanyl drip, [ x] Propofol, [ ] Precedex, [ ] Versed drip, [ ] Ativan drip,                           [ ] OxyContin standing,  [ ] OxyContin PRN, [ ] Dilaudid PRN pushes, [ ] Fentanyl PRN pushes, [ ] PCA,                [ x] Tylenol IV/PO, [ ] Gabapentin, [ ]  Ketorolac, [ ] Tramadol,  [ ] Lidoderm Patch       Other Medications               [ ] Seroquel, [ ] Zyprexa, [ ] Haldol,  [ ] Clonazepam [ ] Xanax, [ ] Versed/Ativan PRN, [ ] Valium [ ] None               [ ] Robaxin   [ ] Baclofen  [ ] Flexeril               [ ] CIWA (Ativan/Valium/ Librium)  CV: continue to monitor, received 500 ml boluses overnight, 100ml Albumin, ECHO with contrast to be schedued  On pressors [x ]  Yes  [ ]  No          [ ]  Levophed, [x] Francesco-Synephrine, [ ] Vasopressin, [ ]  Epinephrine          [ ] Dobutamine, [ ] Milrinone, [ ]  Midodrine,  [ ] Others    Other Cardiac Meds          [ ] Amiodarone IV/PO, [ ] Digoxin, [ ] Cardizem drip, [ ] Cleviprex drip, [ ] Esmolol drip, [ ] Cardene drip  Respiratory: Acute respiratory insufficiency -> continue monitoring, CXR bilateral patchy infiltrates with bilateral pleural effusions                        None Invasive Support  [ ] Incentive Spirometer                                  [ ] BiPAP   [ ] CPAP/NIV   [ ] HFNC   [ ] NR Face Mask  [ ] NC  [ ] Trach Caller [ ] Room Air                        Ventilatory support  [x ] Yes [ ] No                            [ ] SBT                                  [ ] PC    [ ] VC   [x ] AC/PRVC   [ ] BiVent/APRV   [ ]CPAP                         Vent 340/26/10/100%   ABG 7.43/48/59/24   LA 1.3  [x] PEAK pressures 38  GI  [x ]  bowel regiment none [x ] BM +  [x ] Flatus +  [ ] Ostomy   [ ] NG tube  [x] Dignishild placed on 5/6/24  [x] CECE 130ml/24hrs               Prophylaxis [x ] PPI  [ ] H2 Blockers  [ ] Others  Nutrition: continue   [x ] Diet NPO   [ ] TPN/PPN   [ ] calories count   [ ]  Tube Feeds  [x] Donhoff to be placed by primary team   Renal: Continue I&Os monitoring, Nunez catheter  [x ] Yes  [ ]  No  [ ] Consideration for discontinuation [ ] Taxes cath/PrimaFit  [ ] TOV                                                               [ ] HD/CVVH   [x ] Urine output 1315ml/24hrs       Lytes/Acid-base: replete hypokalemia, hypomagnesemia, hypocalcemia, hypophosphatemia [x] Creatinine 0.6       IV Fluids   [x ] LR @KVO [ ] NS  [ ] D5W1/2NS [ ] Bicarbonate Drip  [ ] Albumin [ ] Lasix drip/push  [ ] Bumex drip/push  ID: leukocytosis -> continue to monitor:         IV Abx [x ] Yes -> Zosyn 5/8/24 end date, [ ] No;  ID consulted [ ] Yes, [ x] No        Cultures send  [x ] Respiratory   [ ] Blood   [ ] Urine  [ ] Fluids  [ ] Tissue  [ ]  None  Lines:   [ ] Right   [ ] Left  [ ] Bilateral                     [ ] Subclavian TLC        [ ]  Internal Jugular TLC     [ ]  Femoral TLC                     [ ] Subclavian Cordis    [ ] Internal Jugular Cordis   [ ] Femoral Cordis                     [ ] HD catheter    [ ] PICC     [ x]  Midline   [x ] Peripheral IVs                                                 [x ] Right   [ ] Left   [ ] None                     [x ] Radial A-Line    [ ] Femoral A-Line   [ ] Axillary A-Line               Heme: continue to evaluate for acute blood loss anemia- trend Hg/Hct                     AC Reversal Indicated [ ] Yes  [ x] No                                      [ ] Kcentra,  [ ] 3Factors concentrate, [ ] Andexxa                     Transfused  indicated  [ ] Yes, [x ] No    [ ] PRBCs   [ ] Platelets   [ ] FFPs   [ ] Cryoprecipitate                    Should be started on or continued with  following  [ ] Yes,  [x ] No                               [ ] Lovenox  [ ] Coumadin  [ ] Heparin drip  [ ] DOVACs  [ ] ASA  [ ] Antiplatelets   Endocrine: Prevent and treat hyperglycemia with insulin as needed,                                [ x] Sliding scale,  [ ] Lantus/Regular Insuline                              Insuline drip for hyperglycemia [ ] Yes, [ x] No   PV: follow pulse exam  Skin: decub precautions  DVT Prophylaxis:  [x ] SCDs  [ ] Heparin SQ  [x] Lovenox  SQ  Stress Gastritis Prophylaxis: PPI/H2 Blockers if indicated  Mobility: patient is evaluated at the bedside with mobility team and the goals for today are discussed with PT [ x] bed rest due to respiratory status    PATIENT/FAMILY/SURROGATE CONFERENCE:  [x ] Yes with patient's family at the bedside. [ ] No  PURPOSE: To obtain necessary information, To discuss treatment options under consideration today.    I saw and evaluated the patient personally. I have reviewed and agree with note above. Treatment plan discussed with SICU team, nurses and primary team at the time of the multidisciplinary rounds. The above note is NOT written at the time of rounds and will reflect all changes throughout management of the patient for the day note is written for.    Mireya Tipton MD, FACS  Trauma/ACS/SCC Attending
Critical Care: [id number]   This patient has a high probability of sudden, clinically significant deterioration, which requires the highest level of physician preparedness to intervene urgently. I managed/supervised life or organ supporting interventions that required frequent physician assessment. I devoted my full attention in the ICU to the direct care of this patient for the period of time indicated below. Time I spent with the family or surrogate(s) is included only if the patient was incapable of providing the necessary information or participating in medical decision making. Time devoted to teaching and to any procedures I billed separately is not included.     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 Patient was on BiPAP for oxygen requirement and gradually progressed to full respiratory failure due to hypoxia, intubated 5/5/24. Hemodynamically unstable on pressors. Electrolytes abnormalities, at risk for decline.  Patient is examined and evaluated at the bedside with SICU team. Treatment plan discussed with SICU team, nurses and primary team.   Chest X-ray and all relevant studies reviewed during rounds.  Will continue hemodynamic monitoring as per protocol in SICU.    Neuro:  GCS [15 ]   [x ]  Neurovascular checks as per SICU protocol                 [ ] 3% NaCl     [ ] 2% NaCl                [ ] Keppra  [ ] Lamictal  [ ] Depakote  [ ] Dilantin [x] None                Pharmacological Paralysis [ ] Yes  [x ]  No       Sedated/Pain control with                 [ ] Dilaudid drip, [ ]  Ketamine drip, [x ] Fentanyl drip, [ x] Propofol, [ ] Precedex, [ ] Versed drip, [ ] Ativan drip,                           [ ] OxyContin standing,  [ ] OxyContin PRN, [ ] Dilaudid PRN pushes, [ ] Fentanyl PRN pushes, [ ] PCA,                [ x] Tylenol IV/PO, [ ] Gabapentin, [ ]  Ketorolac, [ ] Tramadol,  [ ] Lidoderm Patch       Other Medications               [ ] Seroquel, [ ] Zyprexa, [ ] Haldol,  [ ] Clonazepam [ ] Xanax, [ ] Versed/Ativan PRN, [ ] Valium [x ] None               [ ] Robaxin   [ ] Baclofen  [ ] Flexeril               [ ] CIWA (Ativan/Valium/ Librium)  CV: continue to monitor, ECHO with contrast EF70% PAP 60  On pressors [ ]  Yes  [x ]  No          [ ]  Levophed, [ ] Francesco-Synephrine, [ ] Vasopressin, [ ]  Epinephrine          [ ] Dobutamine, [ ] Milrinone, [ ]  Midodrine,  [ ] Others    Other Cardiac Meds          [ ] Amiodarone IV/PO, [ ] Digoxin, [ ] Cardizem drip, [ ] Cleviprex drip, [ ] Esmolol drip, [ ] Cardene drip  Respiratory: Acute respiratory insufficiency -> continue monitoring, CXR bilateral patchy infiltrates with poor inspiratory efforts                         None Invasive Support  [ x] Incentive Spirometer                                  [ ] BiPAP   [ ] CPAP/NIV   [ x] HFNC   [ ] NR Face Mask  [ ] NC  [ ] Trach Caller [ ] Room Air                        Ventilatory support  [ ] Yes [ x] No                            [ ] SBT                                  [ ] PC    [ ] VC   [ ] AC/PRVC   [ ] BiVent/APRV   [ ]CPAP                        ABG 7.42/47/86/30 post extubation    LA 0.9   GI  [x ]  bowel regiment none [x ] BM +  [x ] Flatus +  [ ] Ostomy   [ ] NG tube [ ] Dignishild  [ x] CECE 90ml/24hrs               Prophylaxis [x ] PPI  [ ] H2 Blockers  [ ] Others  Nutrition: continue   [x ] Diet Bariatric clears   [ ] TPN/PPN   [ ] calories count   [ ]  Tube Feeds [ ] Dobhoff   Renal: Continue I&Os monitoring, Nunez catheter  [x ] Yes  [ ]  No  [ ] Consideration for discontinuation [ ] Taxes cath/PrimaFit  [ ] TOV                                                               [ ] HD/CVVH   [x ] Urine output 3000ml/24hrs       Lytes/Acid-base: replete hypokalemia, hypomagnesemia, hypocalcemia, hypophosphatemia [x] Creatinine 0.5->0.7->0.5       IV Fluids   [ ] LR  [ ] NS  [ ] D5W1/2NS [ ] Bicarbonate Drip  [ ] Albumin [x ] Lasix push  [ ] Bumex drip/push  ID: leukocytosis -> continue to monitor: Febrile on 5/8/24 AM WBC 13->15.8->16.2 ->15 Pro-calcitonin 0.5        IV Abx [x ] Yes -> Zosyn, will add Vanco, [ ] No;  ID consulted [ ] Yes, [ x] No        Cultures send  [x ] Respiratory   [ ] Blood   [ ] Urine  [ ] Fluids  [ ] Tissue  [ ]  None  Lines:   [ ] Right   [ ] Left  [ ] Bilateral                     [ ] Subclavian TLC        [ ]  Internal Jugular TLC     [ ]  Femoral TLC                     [ ] Subclavian Cordis    [ ] Internal Jugular Cordis   [ ] Femoral Cordis                     [ ] HD catheter    [ ] PICC     [ x]  Midline   [x ] Peripheral IVs                                                 [ ] Right   [x ] Left   [ ] None                     [x ] Radial A-Line    [ ] Femoral A-Line   [ ] Axillary A-Line               Heme: continue to evaluate for acute blood loss anemia- trend Hg/Hct                     AC Reversal Indicated [ ] Yes  [ x] No                                      [ ] Kcentra,  [ ] 3Factors concentrate, [ ] Andexxa                     Transfused  indicated  [ ] Yes, [x ] No    [ ] PRBCs   [ ] Platelets   [ ] FFPs   [ ] Cryoprecipitate                    Should be started on or continued with  following  [ ] Yes,  [x ] No                               [ ] Lovenox  [ ] Coumadin  [ ] Heparin drip  [ ] DOVACs  [ ] ASA  [ ] Antiplatelets   Endocrine: Prevent and treat hyperglycemia with insulin as needed,                                [ x] Sliding scale,  [ ] Lantus/Regular Insuline                              Insuline drip for hyperglycemia [ ] Yes, [ x] No   PV: follow pulse exam  Skin: decub precautions  DVT Prophylaxis:  [x ] SCDs  [ ] Heparin SQ  [x] Lovenox  SQ  Stress Gastritis Prophylaxis: PPI/H2 Blockers if indicated  Mobility: patient is evaluated at the bedside with mobility team and the goals for today are discussed with PT [ x] bed rest due to respiratory status  PATIENT/FAMILY/SURROGATE CONFERENCE:  [x ] Yes with patient's family at the bedside. [ ] No  PURPOSE: To obtain necessary information, To discuss treatment options under consideration today.  I saw and evaluated the patient personally. I have reviewed and agree with note above. Treatment plan discussed with SICU team, nurses and primary team at the time of the multidisciplinary rounds. The above note is NOT written at the time of rounds and will reflect all changes throughout management of the patient for the day note is written for.    Mireya Tipton MD, FACS  Trauma/ACS/SCC Attending
Critical Care: [id number]   This patient has a high probability of sudden, clinically significant deterioration, which requires the highest level of physician preparedness to intervene urgently. I managed/supervised life or organ supporting interventions that required frequent physician assessment. I devoted my full attention in the ICU to the direct care of this patient for the period of time indicated below. Time I spent with the family or surrogate(s) is included only if the patient was incapable of providing the necessary information or participating in medical decision making. Time devoted to teaching and to any procedures I billed separately is not included.     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 Patient was on BiPAP for oxygen requirement and gradually progressed to full respiratory failure due to hypoxia, intubated 5/5/24. Hemodynamically unstable on pressors. Electrolytes abnormalities, at risk for decline.  Patient is examined and evaluated at the bedside with SICU team. Treatment plan discussed with SICU team, nurses and primary team.   Chest X-ray and all relevant studies reviewed during rounds.  Will continue hemodynamic monitoring as per protocol in SICU.    Neuro:  GCS [3t ]   [x ]  Neurovascular checks as per SICU protocol                 [ ] 3% NaCl     [ ] 2% NaCl                [ ] Keppra  [ ] Lamictal  [ ] Depakote  [ ] Dilantin                Pharmacological Paralysis [x ] Yes  [ ]  No BIS 34-37      Sedated/Pain control with                 [ ] Dilaudid drip, [ ]  Ketamine drip, [x ] Fentanyl drip, [ x] Propofol, [ ] Precedex, [ ] Versed drip, [ ] Ativan drip,                           [ ] OxyContin standing,  [ ] OxyContin PRN, [ ] Dilaudid PRN pushes, [ ] Fentanyl PRN pushes, [ ] PCA,                [ x] Tylenol IV/PO, [ ] Gabapentin, [ ]  Ketorolac, [ ] Tramadol,  [ ] Lidoderm Patch       Other Medications               [ ] Seroquel, [ ] Zyprexa, [ ] Haldol,  [ ] Clonazepam [ ] Xanax, [ ] Versed/Ativan PRN, [ ] Valium [ ] None               [ ] Robaxin   [ ] Baclofen  [ ] Flexeril               [ ] CIWA (Ativan/Valium/ Librium)  CV: continue to monitor, received 500 ml boluses overnight, 100ml Albumin, ECHO with contrast EF70% PAP 60  SVV 5.7 CI 2.7  On pressors [x ]  Yes  [ ]  No          [ ]  Levophed, [x] Francesco-Synephrine@0.2mcg, [ ] Vasopressin, [ ]  Epinephrine          [ ] Dobutamine, [ ] Milrinone, [ ]  Midodrine,  [ ] Others    Other Cardiac Meds          [ ] Amiodarone IV/PO, [ ] Digoxin, [ ] Cardizem drip, [ ] Cleviprex drip, [ ] Esmolol drip, [ ] Cardene drip  Respiratory: Acute respiratory insufficiency -> continue monitoring, CXR bilateral patchy infiltrates with bilateral pleural effusions left>right                        None Invasive Support  [ ] Incentive Spirometer                                  [ ] BiPAP   [ ] CPAP/NIV   [ ] HFNC   [ ] NR Face Mask  [ ] NC  [ ] Trach Caller [ ] Room Air                        Ventilatory support  [x ] Yes [ ] No                            [ ] SBT                                  [ ] PC    [ ] VC   [x ] AC/PRVC   [ ] BiVent/APRV   [ ]CPAP                         Vent 340/30/10/60%   ABG 7.43/48/59/24   LA 1.1  [x] PEAK pressures 29-30  GI  [x ]  bowel regiment none [x ] BM +  [x ] Flatus +  [ ] Ostomy   [ ] NG tube  [x] Dignishild placed on 5/6/24  [ x] CECE 215ml/24hrs               Prophylaxis [x ] PPI  [ ] H2 Blockers  [ ] Others  Nutrition: continue   [x ] Diet NPO   [ ] TPN/PPN   [ ] calories count   [x ]  Tube Feeds Glucerna 1.2@20ml/hr  [x] Dobhoff   Renal: Continue I&Os monitoring, Nunez catheter  [x ] Yes  [ ]  No  [ ] Consideration for discontinuation [ ] Taxes cath/PrimaFit  [ ] TOV                                                               [ ] HD/CVVH   [x ] Urine output 980ml/24hrs       Lytes/Acid-base: replete hypokalemia, hypomagnesemia, hypocalcemia, hypophosphatemia [x] Creatinine 0.5       IV Fluids   [x ] LR @KVO [ ] NS  [ ] D5W1/2NS [ ] Bicarbonate Drip  [ ] Albumin [x ] Lasix push 10mg x once [ ] Bumex drip/push  ID: leukocytosis -> continue to monitor:         IV Abx [x ] Yes -> Zosyn 5/8/24 end date, [ ] No;  ID consulted [ ] Yes, [ x] No        Cultures send  [x ] Respiratory   [ ] Blood   [ ] Urine  [ ] Fluids  [ ] Tissue  [ ]  None  Lines:   [ ] Right   [ ] Left  [ ] Bilateral                     [ ] Subclavian TLC        [ ]  Internal Jugular TLC     [ ]  Femoral TLC                     [ ] Subclavian Cordis    [ ] Internal Jugular Cordis   [ ] Femoral Cordis                     [ ] HD catheter    [ ] PICC     [ x]  Midline   [x ] Peripheral IVs                                                 [x ] Right   [ ] Left   [ ] None                     [x ] Radial A-Line    [ ] Femoral A-Line   [ ] Axillary A-Line               Heme: continue to evaluate for acute blood loss anemia- trend Hg/Hct                     AC Reversal Indicated [ ] Yes  [ x] No                                      [ ] Kcentra,  [ ] 3Factors concentrate, [ ] Andexxa                     Transfused  indicated  [ ] Yes, [x ] No    [ ] PRBCs   [ ] Platelets   [ ] FFPs   [ ] Cryoprecipitate                    Should be started on or continued with  following  [ ] Yes,  [x ] No                               [ ] Lovenox  [ ] Coumadin  [ ] Heparin drip  [ ] DOVACs  [ ] ASA  [ ] Antiplatelets   Endocrine: Prevent and treat hyperglycemia with insulin as needed,                                [ x] Sliding scale,  [ ] Lantus/Regular Insuline                              Insuline drip for hyperglycemia [ ] Yes, [ x] No   PV: follow pulse exam  Skin: decub precautions  DVT Prophylaxis:  [x ] SCDs  [ ] Heparin SQ  [x] Lovenox  SQ  Stress Gastritis Prophylaxis: PPI/H2 Blockers if indicated  Mobility: patient is evaluated at the bedside with mobility team and the goals for today are discussed with PT [ x] bed rest due to respiratory status  PATIENT/FAMILY/SURROGATE CONFERENCE:  [x ] Yes with patient's family at the bedside. [ ] No  PURPOSE: To obtain necessary information, To discuss treatment options under consideration today.  I saw and evaluated the patient personally. I have reviewed and agree with note above. Treatment plan discussed with SICU team, nurses and primary team at the time of the multidisciplinary rounds. The above note is NOT written at the time of rounds and will reflect all changes throughout management of the patient for the day note is written for.    Mireya Tipton MD, FACS  Trauma/ACS/SCC Attending

## 2024-05-14 NOTE — PHYSICAL THERAPY INITIAL EVALUATION ADULT - GENERAL OBSERVATIONS, REHAB EVAL
Pt was approached for PT re-eval. Pt decline PT at this time 2/2 fatigue. PT will follow up when appropriate
Patient was seen from 10:50-11:20 for PT IE. Patient was rec'd in semi reclined in bed, +IVL, +2 L/min O2 via NC, NAD, agreeable to participate in PT. Patient's daughter was present b/s t/o the session.

## 2024-05-14 NOTE — CHART NOTE - NSCHARTNOTESELECT_GEN_ALL_CORE
Event Note
Gastroenterology Nutrition Support/Nutrition Services
Letter of Medical Necessity/Event Note
Transfer Note
PACU Admission/Event Note
POC- Surgery/Event Note
Surgery/Event Note
anesthesia pacu note/Transfer Note

## 2024-05-14 NOTE — DISCHARGE NOTE NURSING/CASE MANAGEMENT/SOCIAL WORK - PATIENT PORTAL LINK FT
You can access the FollowMyHealth Patient Portal offered by [facility] by registering at the following website: [email or web address]. By joining [facility]’s FollowMyHealth portal, you will also be able to view your health information using other applications (apps) compatible with our system.

## 2024-05-14 NOTE — PHYSICAL THERAPY INITIAL EVALUATION ADULT - GAIT TRAINING, PT EVAL
Goal: By discharge: Ambulation: 100 feet with rolling walker with supervision Stairs: 5 with 1 HR with minimal assist

## 2024-05-14 NOTE — CHART NOTE - NSCHARTNOTEFT_GEN_A_CORE
It is medically necessary for this patient to receive a rolling walker due to asthma and OHS. It was recommended by physical therapy to use at home for safety.     It is medically necessary for this patient to receive a 3-in-1 commode due to being confined to a single room/level with no bathroom due to asthma and OHS.

## 2024-05-15 VITALS — OXYGEN SATURATION: 96 % | TEMPERATURE: 98 F | HEART RATE: 65 BPM | RESPIRATION RATE: 18 BRPM

## 2024-05-15 RX ORDER — SPIRONOLACTONE 25 MG/1
2 TABLET, FILM COATED ORAL
Qty: 0 | Refills: 0 | DISCHARGE
Start: 2024-05-15

## 2024-05-15 RX ORDER — LOSARTAN POTASSIUM 100 MG/1
1 TABLET, FILM COATED ORAL
Refills: 0 | DISCHARGE

## 2024-05-15 RX ORDER — PANTOPRAZOLE SODIUM 20 MG/1
1 TABLET, DELAYED RELEASE ORAL
Qty: 0 | Refills: 0 | DISCHARGE
Start: 2024-05-15

## 2024-05-15 RX ORDER — SPIRONOLACTONE 25 MG/1
50 TABLET, FILM COATED ORAL
Qty: 0 | Refills: 0 | DISCHARGE
Start: 2024-05-15

## 2024-05-15 RX ORDER — SPIRONOLACTONE 25 MG/1
1 TABLET, FILM COATED ORAL
Refills: 0 | DISCHARGE

## 2024-05-15 RX ORDER — PANTOPRAZOLE SODIUM 20 MG/1
1 TABLET, DELAYED RELEASE ORAL
Refills: 0 | DISCHARGE

## 2024-05-15 RX ADMIN — LOSARTAN POTASSIUM 50 MILLIGRAM(S): 100 TABLET, FILM COATED ORAL at 05:18

## 2024-05-15 RX ADMIN — SPIRONOLACTONE 50 MILLIGRAM(S): 25 TABLET, FILM COATED ORAL at 05:19

## 2024-05-15 RX ADMIN — DULOXETINE HYDROCHLORIDE 60 MILLIGRAM(S): 30 CAPSULE, DELAYED RELEASE ORAL at 05:19

## 2024-05-15 RX ADMIN — Medication 1 DROP(S): at 05:21

## 2024-05-15 RX ADMIN — CHLORHEXIDINE GLUCONATE 1 APPLICATION(S): 213 SOLUTION TOPICAL at 05:22

## 2024-05-15 RX ADMIN — Medication 650 MILLIGRAM(S): at 05:18

## 2024-05-15 RX ADMIN — ENOXAPARIN SODIUM 40 MILLIGRAM(S): 100 INJECTION SUBCUTANEOUS at 05:18

## 2024-05-15 RX ADMIN — Medication 600 MILLIGRAM(S): at 05:19

## 2024-05-15 NOTE — PROGRESS NOTE ADULT - SUBJECTIVE AND OBJECTIVE BOX
GENERAL SURGERY PROGRESS NOTE     SARAH BERGMAN  58y  Female  Hospital day :17d  POD:14d  Procedure: Robot-assisted hiatal herniorrhaphy using da Madhuri Xi    Creation, bypass, gastric, laparoscopic, robot-assisted    Diagnostic laparoscopy    Revision of anastomosis of small intestine    Laparoscopic lysis of abdominal adhesions    Upper endoscopy    Release of small bowel obstruction    Midline catheter insertion    Arterial cannulation, percutaneous, for monitoring    Ultrasound guided venous access    OVERNIGHT EVENTS:  - Tolerating bariatric clear liquids  - No nausea or vomiting  - Passing gas, no bowel movements  - Saturating well on 3 liter oxygen through nasal cannula    T(F): 98 (05-15-24 @ 04:30), Max: 98.1 (05-14-24 @ 16:00)  HR: 87 (05-15-24 @ 04:30) (85 - 92)  BP: 133/82 (05-15-24 @ 04:30) (108/68 - 142/80)  RR: 18 (05-15-24 @ 04:30) (18 - 18)  SpO2: 98% (05-15-24 @ 04:30) (97% - 98%)    GI proph:  pantoprazole    Tablet 40 milliGRAM(s) Oral daily    AC/ proph: enoxaparin Injectable 40 milliGRAM(s) SubCutaneous every 12 hours    PHYSICAL EXAM:  GENERAL: NAD, well-appearing  CHEST/LUNG: Clear to auscultation bilaterally, On nasal cannula  HEART: Regular rate and rhythm  ABDOMEN: Soft, Nontender, Nondistended;       LABS  Labs:               11.2   12.55 )-----------( 404      ( 14 May 2024 22:45 )             37.1         05-14    136  |  102  |  8<L>  ----------------------------<  76  4.7   |  18  |  0.5<L>      Calcium: 9.0 mg/dL (05-14-24 @ 22:45)      LFTs:       ABG - ( 11 May 2024 21:32 )  pH: 7.51  /  pCO2: 34    /  pO2: 105   / HCO3: 27    / Base Excess: 4.3   /  SaO2: x               ABG - ( 11 May 2024 13:02 )  pH: 7.41  /  pCO2: 45    /  pO2: 73    / HCO3: 28    / Base Excess: 3.4   /  SaO2: 97.2            ABG - ( 10 May 2024 15:17 )  pH: 7.42  /  pCO2: 47    /  pO2: 86    / HCO3: 30    / Base Excess: 5.1   /  SaO2: 99.4      Urinalysis Basic - ( 14 May 2024 22:45 )    Color: x / Appearance: x / SG: x / pH: x  Gluc: 76 mg/dL / Ketone: x  / Bili: x / Urobili: x   Blood: x / Protein: x / Nitrite: x   Leuk Esterase: x / RBC: x / WBC x   Sq Epi: x / Non Sq Epi: x / Bacteria: x            RADIOLOGY & ADDITIONAL TESTS:  No new imaging.

## 2024-05-15 NOTE — PROGRESS NOTE ADULT - ASSESSMENT
ASSESSMENT:  SARAH BERGMAN is a 58y F w/ PMHx of  HTN, RAFA (not on home CPAP/BiPAP), GERD, pseudogout, migraines, renal stones, h/o depression and anxiety, surgical history of bladder repair and cholecystectomy, who presented to [facility] on 4/29/2024 for elective robotic-assisted hiatal herniorrhaphy w/ gastric bypass. Post-operative course c/b abdominal pain and emesis. CT abdomen/pelvis obtained which showed a narrowed JJ anastomosis. On 5/1, patient was taken back to the OR for a diagnostic laparoscopy, release and revision of jejunojejunal anastomosis w/ negative leak test. Concern for aspiration intra-operatively, patient found to be hypotensive and tachycardic post-op. Admitted to SICU for resuscitation and pressor needs post op. Pt eventually extubated and placed on HFNC. On 5/13 pt was downgraded to the floor.     PLAN:  - Hemodynamic monitoring  - Continue bariatric clear liquids  - Monitor for episodes of nausea and vomiting  - Monitor bowel function  - Monitor respiratory status and oxygen requirements  - Discharge planning today with rolling walker and oxygen therapy at home    [phone]

## 2024-05-19 DIAGNOSIS — E66.01 MORBID (SEVERE) OBESITY DUE TO EXCESS CALORIES: ICD-10-CM

## 2024-05-19 DIAGNOSIS — Z91.041 RADIOGRAPHIC DYE ALLERGY STATUS: ICD-10-CM

## 2024-05-19 DIAGNOSIS — N17.9 ACUTE KIDNEY FAILURE, UNSPECIFIED: ICD-10-CM

## 2024-05-19 DIAGNOSIS — J69.0 PNEUMONITIS DUE TO INHALATION OF FOOD AND VOMIT: ICD-10-CM

## 2024-05-19 DIAGNOSIS — J96.91 RESPIRATORY FAILURE, UNSPECIFIED WITH HYPOXIA: ICD-10-CM

## 2024-05-19 DIAGNOSIS — F32.A DEPRESSION, UNSPECIFIED: ICD-10-CM

## 2024-05-19 DIAGNOSIS — K21.9 GASTRO-ESOPHAGEAL REFLUX DISEASE WITHOUT ESOPHAGITIS: ICD-10-CM

## 2024-05-19 DIAGNOSIS — Z90.49 ACQUIRED ABSENCE OF OTHER SPECIFIED PARTS OF DIGESTIVE TRACT: ICD-10-CM

## 2024-05-19 DIAGNOSIS — G47.33 OBSTRUCTIVE SLEEP APNEA (ADULT) (PEDIATRIC): ICD-10-CM

## 2024-05-22 ENCOUNTER — APPOINTMENT (OUTPATIENT)
Dept: SURGERY | Facility: CLINIC | Age: 59
End: 2024-05-22
Payer: MEDICARE

## 2024-05-22 VITALS
HEIGHT: 64 IN | WEIGHT: 229 LBS | DIASTOLIC BLOOD PRESSURE: 90 MMHG | SYSTOLIC BLOOD PRESSURE: 130 MMHG | OXYGEN SATURATION: 96 % | BODY MASS INDEX: 39.09 KG/M2 | HEART RATE: 133 BPM

## 2024-05-22 DIAGNOSIS — K21.9 DIAPHRAGMATIC HERNIA W/OUT OBSTRUCTION OR GANGRENE: ICD-10-CM

## 2024-05-22 DIAGNOSIS — J45.901 UNSPECIFIED ASTHMA WITH (ACUTE) EXACERBATION: ICD-10-CM

## 2024-05-22 DIAGNOSIS — K76.6 PORTAL HYPERTENSION: ICD-10-CM

## 2024-05-22 DIAGNOSIS — K44.9 DIAPHRAGMATIC HERNIA W/OUT OBSTRUCTION OR GANGRENE: ICD-10-CM

## 2024-05-22 PROCEDURE — 99024 POSTOP FOLLOW-UP VISIT: CPT

## 2024-05-23 PROBLEM — J45.901 MODERATE ASTHMA WITH ACUTE EXACERBATION, UNSPECIFIED WHETHER PERSISTENT: Status: ACTIVE | Noted: 2024-02-06

## 2024-05-23 PROBLEM — K44.9 HIATAL HERNIA WITH GERD: Status: ACTIVE | Noted: 2023-09-27

## 2024-05-23 PROBLEM — K76.6 PORTAL HYPERTENSION: Status: ACTIVE | Noted: 2024-05-23

## 2024-05-23 NOTE — PHYSICAL EXAM
[Normal] : affect appropriate [de-identified] : nonlabored breathing, no wheezing, on portable NC [de-identified] : s1,s2, mild tachy 95 in room after sitting  [de-identified] : soft, nontender, incisions c/d/i

## 2024-05-23 NOTE — ASSESSMENT
[FreeTextEntry1] : 57 yo female with class 3 obesity, asthma, portal hypertension, anxiety, hiatal hernia with barretts s/p robo gastric bypass and POD1 revision of jj due to obstruction

## 2024-05-23 NOTE — PLAN
[FreeTextEntry1] : Dietary counseling provided, stick to high protein meals and limit carbs, ok to advance to "soft foods"  Continue to stay hydrated and drink at least 60oz liquids daily  Increase activity as tolerated, encouraged walking  Follow up with cardiology and pulmonary  RTO in 2 weeks for close follow up

## 2024-05-23 NOTE — HISTORY OF PRESENT ILLNESS
[de-identified] : 57 yo female with class 3 obesity, asthma, anxiety and a hiatal hernia s/p robotic gastric bypass 4/29, complicated by an obstruction at the jj that was taken back to the OR POD1 to relive the obstruction. After surgery she developed ARDS likely 2/2 aspiration pneumonitis and required intubation. An echo reveled severe portal hypertension at that time. Thankfully, she recovered well and once extubated she weaned off of high flow O2 quickly and was discharged home.  Since discharge she has tolerated liquids and pureed foods. She has had issues (N/V) with high carbohydrate foods such as a muffin which is expected and not recommend to try at this point. She is able to walk short distances and took the stairs today. She is having some diarrhea.  She also remains extremely anxious but she stopped all her psych meds after discharge from the hospital for unclear reasons. She will resume these. She is taking her vitamins and ppi. She has stopped her spirinolcatone and has not noticed any leg swelling. She is also holding her losartan and he BP is currently normal.

## 2024-05-29 ENCOUNTER — NON-APPOINTMENT (OUTPATIENT)
Age: 59
End: 2024-05-29

## 2024-06-05 ENCOUNTER — APPOINTMENT (OUTPATIENT)
Dept: SURGERY | Facility: CLINIC | Age: 59
End: 2024-06-05
Payer: MEDICARE

## 2024-06-05 VITALS
WEIGHT: 224 LBS | DIASTOLIC BLOOD PRESSURE: 90 MMHG | HEART RATE: 116 BPM | BODY MASS INDEX: 38.24 KG/M2 | OXYGEN SATURATION: 96 % | HEIGHT: 64 IN | TEMPERATURE: 97 F | SYSTOLIC BLOOD PRESSURE: 124 MMHG

## 2024-06-05 DIAGNOSIS — E66.01 MORBID (SEVERE) OBESITY DUE TO EXCESS CALORIES: ICD-10-CM

## 2024-06-05 PROCEDURE — 99024 POSTOP FOLLOW-UP VISIT: CPT

## 2024-06-05 NOTE — PLAN
[FreeTextEntry1] :  RTO in 1 month for close follow up Continue high protein diet and adequate fluid intake, continue to advance Encouraged exercise and add resistance training as able Continue vitamins and PPI Continue PT Labs with next visit

## 2024-06-05 NOTE — PHYSICAL EXAM
[Normal] : affect appropriate [de-identified] : nonlabored breathing, voice clearer [de-identified] : s1,s2, mild tachy  [de-identified] : soft, nontender, incisions c/d/i

## 2024-06-05 NOTE — HISTORY OF PRESENT ILLNESS
[de-identified] : 59 yo female with class 3 obesity, asthma, anxiety and a hiatal hernia s/p robotic gastric bypass 4/29, complicated by an obstruction at the jj that was taken back to the OR POD1 to relive the obstruction. After surgery she developed ARDS likely 2/2 aspiration pneumonitis and required intubation. An echo reveled severe portal hypertension at that time. Thankfully, she recovered well and once extubated she weaned off of high flow O2 quickly and was discharged home.  Since discharge she has tolerated liquids and pureed foods. She has had issues (N/V) with high carbohydrate foods such as a muffin which is expected and not recommend to try at this point. She is able to walk short distances and took the stairs today. She is having some diarrhea.  She also remains extremely anxious but she stopped all her psych meds after discharge from the hospital for unclear reasons. She will resume these. She is taking her vitamins and ppi. She has stopped her spirinolcatone and has not noticed any leg swelling. She is also holding her losartan and he BP is currently normal.   6/5 She is doing much better. She is off oxygen. She is able to walk more and she did the stairs much easier today. She appears more comfortable and has resumed her psych meds- which has helped ALOT!  She is eating solid foods and tolerating chicken, cold cuts, meatloaf etc. She tolerated is drinking 1-2 protein shakes/day and drinking enough water. She vomited twice but both were after chicken with dressing on it. She is having normal to loose bms. She is taking her vitamins and PPI. She has no acid reflus symptoms.  Her weight loss is very good and she is back ontrack after her long hospital course!

## 2024-06-05 NOTE — ASSESSMENT
[FreeTextEntry1] : 59 yo female with class 3 obesity, asthma, portal hypertension, anxiety, hiatal hernia with barretts s/p robo gastric bypass and POD1 revision of jj due to obstruction (4/29 and 4/30)

## 2024-06-28 ENCOUNTER — NON-APPOINTMENT (OUTPATIENT)
Age: 59
End: 2024-06-28

## 2024-07-12 ENCOUNTER — APPOINTMENT (OUTPATIENT)
Dept: SURGERY | Facility: CLINIC | Age: 59
End: 2024-07-12
Payer: MEDICARE

## 2024-07-12 VITALS
HEART RATE: 108 BPM | DIASTOLIC BLOOD PRESSURE: 84 MMHG | WEIGHT: 217 LBS | HEIGHT: 64 IN | TEMPERATURE: 97 F | BODY MASS INDEX: 37.05 KG/M2 | OXYGEN SATURATION: 96 % | SYSTOLIC BLOOD PRESSURE: 128 MMHG

## 2024-07-12 DIAGNOSIS — M79.3 PANNICULITIS, UNSPECIFIED: ICD-10-CM

## 2024-07-12 DIAGNOSIS — K21.9 DIAPHRAGMATIC HERNIA W/OUT OBSTRUCTION OR GANGRENE: ICD-10-CM

## 2024-07-12 DIAGNOSIS — K44.9 DIAPHRAGMATIC HERNIA W/OUT OBSTRUCTION OR GANGRENE: ICD-10-CM

## 2024-07-12 DIAGNOSIS — E66.01 MORBID (SEVERE) OBESITY DUE TO EXCESS CALORIES: ICD-10-CM

## 2024-07-12 PROCEDURE — 99215 OFFICE O/P EST HI 40 MIN: CPT | Mod: 24

## 2024-07-12 RX ORDER — NYSTATIN 100000 [USP'U]/G
100000 CREAM TOPICAL TWICE DAILY
Qty: 1 | Refills: 3 | Status: ACTIVE | COMMUNITY
Start: 2024-07-12 | End: 1900-01-01

## 2024-07-15 ENCOUNTER — APPOINTMENT (OUTPATIENT)
Dept: ORTHOPEDIC SURGERY | Facility: CLINIC | Age: 59
End: 2024-07-15

## 2024-07-29 ENCOUNTER — APPOINTMENT (OUTPATIENT)
Dept: NEUROSURGERY | Facility: CLINIC | Age: 59
End: 2024-07-29

## 2024-10-08 DIAGNOSIS — E66.813 OBESITY, CLASS 3: ICD-10-CM

## 2024-10-20 ENCOUNTER — EMERGENCY (EMERGENCY)
Facility: HOSPITAL | Age: 59
LOS: 0 days | Discharge: HOME | End: 2024-10-20
Attending: STUDENT IN AN ORGANIZED HEALTH CARE EDUCATION/TRAINING PROGRAM
Payer: MEDICARE

## 2024-10-20 VITALS
HEIGHT: 64 IN | TEMPERATURE: 98 F | SYSTOLIC BLOOD PRESSURE: 145 MMHG | RESPIRATION RATE: 18 BRPM | HEART RATE: 77 BPM | DIASTOLIC BLOOD PRESSURE: 86 MMHG | WEIGHT: 197.09 LBS | OXYGEN SATURATION: 98 %

## 2024-10-20 DIAGNOSIS — I10 ESSENTIAL (PRIMARY) HYPERTENSION: ICD-10-CM

## 2024-10-20 DIAGNOSIS — Z98.890 OTHER SPECIFIED POSTPROCEDURAL STATES: Chronic | ICD-10-CM

## 2024-10-20 DIAGNOSIS — F32.A DEPRESSION, UNSPECIFIED: ICD-10-CM

## 2024-10-20 DIAGNOSIS — Z91.041 RADIOGRAPHIC DYE ALLERGY STATUS: ICD-10-CM

## 2024-10-20 DIAGNOSIS — R10.31 RIGHT LOWER QUADRANT PAIN: ICD-10-CM

## 2024-10-20 DIAGNOSIS — N13.2 HYDRONEPHROSIS WITH RENAL AND URETERAL CALCULOUS OBSTRUCTION: ICD-10-CM

## 2024-10-20 DIAGNOSIS — F41.9 ANXIETY DISORDER, UNSPECIFIED: ICD-10-CM

## 2024-10-20 DIAGNOSIS — R11.0 NAUSEA: ICD-10-CM

## 2024-10-20 DIAGNOSIS — Z98.84 BARIATRIC SURGERY STATUS: ICD-10-CM

## 2024-10-20 DIAGNOSIS — Z90.49 ACQUIRED ABSENCE OF OTHER SPECIFIED PARTS OF DIGESTIVE TRACT: Chronic | ICD-10-CM

## 2024-10-20 DIAGNOSIS — N20.0 CALCULUS OF KIDNEY: Chronic | ICD-10-CM

## 2024-10-20 LAB
ALBUMIN SERPL ELPH-MCNC: 3.9 G/DL — SIGNIFICANT CHANGE UP (ref 3.5–5.2)
ALP SERPL-CCNC: 110 U/L — SIGNIFICANT CHANGE UP (ref 30–115)
ALT FLD-CCNC: 18 U/L — SIGNIFICANT CHANGE UP (ref 0–41)
ANION GAP SERPL CALC-SCNC: 13 MMOL/L — SIGNIFICANT CHANGE UP (ref 7–14)
APPEARANCE UR: CLEAR — SIGNIFICANT CHANGE UP
AST SERPL-CCNC: 18 U/L — SIGNIFICANT CHANGE UP (ref 0–41)
BASOPHILS # BLD AUTO: 0.03 K/UL — SIGNIFICANT CHANGE UP (ref 0–0.2)
BASOPHILS NFR BLD AUTO: 0.2 % — SIGNIFICANT CHANGE UP (ref 0–1)
BILIRUB SERPL-MCNC: 0.3 MG/DL — SIGNIFICANT CHANGE UP (ref 0.2–1.2)
BILIRUB UR-MCNC: NEGATIVE — SIGNIFICANT CHANGE UP
BUN SERPL-MCNC: 21 MG/DL — HIGH (ref 10–20)
CALCIUM SERPL-MCNC: 9.3 MG/DL — SIGNIFICANT CHANGE UP (ref 8.4–10.5)
CHLORIDE SERPL-SCNC: 107 MMOL/L — SIGNIFICANT CHANGE UP (ref 98–110)
CO2 SERPL-SCNC: 22 MMOL/L — SIGNIFICANT CHANGE UP (ref 17–32)
COLOR SPEC: YELLOW — SIGNIFICANT CHANGE UP
CREAT SERPL-MCNC: 0.9 MG/DL — SIGNIFICANT CHANGE UP (ref 0.7–1.5)
DIFF PNL FLD: ABNORMAL
EGFR: 74 ML/MIN/1.73M2 — SIGNIFICANT CHANGE UP
EOSINOPHIL # BLD AUTO: 0.14 K/UL — SIGNIFICANT CHANGE UP (ref 0–0.7)
EOSINOPHIL NFR BLD AUTO: 1 % — SIGNIFICANT CHANGE UP (ref 0–8)
GLUCOSE SERPL-MCNC: 94 MG/DL — SIGNIFICANT CHANGE UP (ref 70–99)
GLUCOSE UR QL: NEGATIVE MG/DL — SIGNIFICANT CHANGE UP
HCG SERPL QL: NEGATIVE — SIGNIFICANT CHANGE UP
HCT VFR BLD CALC: 43.1 % — SIGNIFICANT CHANGE UP (ref 37–47)
HGB BLD-MCNC: 13.5 G/DL — SIGNIFICANT CHANGE UP (ref 12–16)
IMM GRANULOCYTES NFR BLD AUTO: 0.2 % — SIGNIFICANT CHANGE UP (ref 0.1–0.3)
KETONES UR-MCNC: NEGATIVE MG/DL — SIGNIFICANT CHANGE UP
LACTATE SERPL-SCNC: 1.3 MMOL/L — SIGNIFICANT CHANGE UP (ref 0.7–2)
LEUKOCYTE ESTERASE UR-ACNC: NEGATIVE — SIGNIFICANT CHANGE UP
LIDOCAIN IGE QN: 10 U/L — SIGNIFICANT CHANGE UP (ref 7–60)
LYMPHOCYTES # BLD AUTO: 15 % — LOW (ref 20.5–51.1)
LYMPHOCYTES # BLD AUTO: 2.02 K/UL — SIGNIFICANT CHANGE UP (ref 1.2–3.4)
MCHC RBC-ENTMCNC: 27.7 PG — SIGNIFICANT CHANGE UP (ref 27–31)
MCHC RBC-ENTMCNC: 31.3 G/DL — LOW (ref 32–37)
MCV RBC AUTO: 88.5 FL — SIGNIFICANT CHANGE UP (ref 81–99)
MONOCYTES # BLD AUTO: 0.78 K/UL — HIGH (ref 0.1–0.6)
MONOCYTES NFR BLD AUTO: 5.8 % — SIGNIFICANT CHANGE UP (ref 1.7–9.3)
NEUTROPHILS # BLD AUTO: 10.5 K/UL — HIGH (ref 1.4–6.5)
NEUTROPHILS NFR BLD AUTO: 77.8 % — HIGH (ref 42.2–75.2)
NITRITE UR-MCNC: NEGATIVE — SIGNIFICANT CHANGE UP
NRBC # BLD: 0 /100 WBCS — SIGNIFICANT CHANGE UP (ref 0–0)
PH UR: 5.5 — SIGNIFICANT CHANGE UP (ref 5–8)
PLATELET # BLD AUTO: 224 K/UL — SIGNIFICANT CHANGE UP (ref 130–400)
PMV BLD: 11 FL — HIGH (ref 7.4–10.4)
POTASSIUM SERPL-MCNC: 4.2 MMOL/L — SIGNIFICANT CHANGE UP (ref 3.5–5)
POTASSIUM SERPL-SCNC: 4.2 MMOL/L — SIGNIFICANT CHANGE UP (ref 3.5–5)
PROT SERPL-MCNC: 6.5 G/DL — SIGNIFICANT CHANGE UP (ref 6–8)
PROT UR-MCNC: SIGNIFICANT CHANGE UP MG/DL
RBC # BLD: 4.87 M/UL — SIGNIFICANT CHANGE UP (ref 4.2–5.4)
RBC # FLD: 17.3 % — HIGH (ref 11.5–14.5)
SODIUM SERPL-SCNC: 142 MMOL/L — SIGNIFICANT CHANGE UP (ref 135–146)
SP GR SPEC: 1.02 — SIGNIFICANT CHANGE UP (ref 1–1.03)
UROBILINOGEN FLD QL: 1 MG/DL — SIGNIFICANT CHANGE UP (ref 0.2–1)
WBC # BLD: 13.5 K/UL — HIGH (ref 4.8–10.8)
WBC # FLD AUTO: 13.5 K/UL — HIGH (ref 4.8–10.8)

## 2024-10-20 PROCEDURE — 80053 COMPREHEN METABOLIC PANEL: CPT

## 2024-10-20 PROCEDURE — 74177 CT ABD & PELVIS W/CONTRAST: CPT | Mod: MC

## 2024-10-20 PROCEDURE — 81001 URINALYSIS AUTO W/SCOPE: CPT

## 2024-10-20 PROCEDURE — 99285 EMERGENCY DEPT VISIT HI MDM: CPT

## 2024-10-20 PROCEDURE — 99281 EMR DPT VST MAYX REQ PHY/QHP: CPT

## 2024-10-20 PROCEDURE — 84703 CHORIONIC GONADOTROPIN ASSAY: CPT

## 2024-10-20 PROCEDURE — 99284 EMERGENCY DEPT VISIT MOD MDM: CPT | Mod: 25

## 2024-10-20 PROCEDURE — 85025 COMPLETE CBC W/AUTO DIFF WBC: CPT

## 2024-10-20 PROCEDURE — 96374 THER/PROPH/DIAG INJ IV PUSH: CPT | Mod: XU

## 2024-10-20 PROCEDURE — 74177 CT ABD & PELVIS W/CONTRAST: CPT | Mod: 26,MC

## 2024-10-20 PROCEDURE — 36415 COLL VENOUS BLD VENIPUNCTURE: CPT

## 2024-10-20 PROCEDURE — 83690 ASSAY OF LIPASE: CPT

## 2024-10-20 PROCEDURE — 83605 ASSAY OF LACTIC ACID: CPT

## 2024-10-20 PROCEDURE — 96375 TX/PRO/DX INJ NEW DRUG ADDON: CPT

## 2024-10-20 RX ORDER — SODIUM CHLORIDE 0.9 % (FLUSH) 0.9 %
1000 SYRINGE (ML) INJECTION ONCE
Refills: 0 | Status: COMPLETED | OUTPATIENT
Start: 2024-10-20 | End: 2024-10-20

## 2024-10-20 RX ORDER — METHYLPREDNISOLONE ACETATE 80 MG/ML
40 INJECTION, SUSPENSION INTRA-ARTICULAR; INTRALESIONAL; INTRAMUSCULAR; SOFT TISSUE ONCE
Refills: 0 | Status: COMPLETED | OUTPATIENT
Start: 2024-10-20 | End: 2024-10-20

## 2024-10-20 RX ORDER — TAMSULOSIN HCL 0.4 MG
1 CAPSULE ORAL
Qty: 14 | Refills: 0
Start: 2024-10-20 | End: 2024-11-02

## 2024-10-20 RX ORDER — ONDANSETRON HCL/PF 4 MG/2 ML
4 VIAL (ML) INJECTION ONCE
Refills: 0 | Status: COMPLETED | OUTPATIENT
Start: 2024-10-20 | End: 2024-10-20

## 2024-10-20 RX ORDER — IOHEXOL 350 MG/ML
30 INJECTION, SOLUTION INTRAVENOUS ONCE
Refills: 0 | Status: COMPLETED | OUTPATIENT
Start: 2024-10-20 | End: 2024-10-20

## 2024-10-20 RX ORDER — DIPHENHYDRAMINE HCL 12.5MG/5ML
50 LIQUID (ML) ORAL ONCE
Refills: 0 | Status: COMPLETED | OUTPATIENT
Start: 2024-10-20 | End: 2024-10-20

## 2024-10-20 RX ORDER — KETOROLAC TROMETHAMINE 10 MG/1
15 TABLET, FILM COATED ORAL ONCE
Refills: 0 | Status: DISCONTINUED | OUTPATIENT
Start: 2024-10-20 | End: 2024-10-20

## 2024-10-20 RX ADMIN — Medication 4 MILLIGRAM(S): at 11:39

## 2024-10-20 RX ADMIN — METHYLPREDNISOLONE ACETATE 40 MILLIGRAM(S): 80 INJECTION, SUSPENSION INTRA-ARTICULAR; INTRALESIONAL; INTRAMUSCULAR; SOFT TISSUE at 11:38

## 2024-10-20 RX ADMIN — Medication 1000 MILLILITER(S): at 11:35

## 2024-10-20 RX ADMIN — Medication 50 MILLIGRAM(S): at 11:39

## 2024-10-20 RX ADMIN — IOHEXOL 30 MILLILITER(S): 350 INJECTION, SOLUTION INTRAVENOUS at 11:40

## 2024-10-20 RX ADMIN — KETOROLAC TROMETHAMINE 15 MILLIGRAM(S): 10 TABLET, FILM COATED ORAL at 11:39

## 2024-10-20 NOTE — ED ADULT NURSE NOTE - NSFALLUNIVINTERV_ED_ALL_ED
Bed/Stretcher in lowest position, wheels locked, appropriate side rails in place/Call bell, personal items and telephone in reach/Instruct patient to call for assistance before getting out of bed/chair/stretcher/Non-slip footwear applied when patient is off stretcher/[place] to call system/Physically safe environment - no spills, clutter or unnecessary equipment/Purposeful proactive rounding/Room/bathroom lighting operational, light cord in reach

## 2024-10-20 NOTE — ED PROVIDER NOTE - ATTENDING CONTRIBUTION TO CARE
59-year-old female with a past medical history significant for bariatric surgery in April 2024 hypertension RAFA GERD pseudogout migraines kidney stones depression anxiety who presents with abdominal pain.  Patient states that for the last couple days she has been having right flank pain that radiates to the right lower quadrant and to the upper abdomen.  Patient denies any urinary symptoms nausea vomiting fever chills or any other medical complaints.    VITAL SIGNS: I have reviewed nursing notes and confirm.  CONSTITUTIONAL: non-toxic, well appearing  SKIN: no rash, no petechiae.  EYES: EOMI, pink conjunctiva, anicteric  ENT: tongue midline, no exudates, MMM  NECK: Supple; no meningismus, no JVD  CARD: RRR, no murmurs, equal radial pulses bilaterally 2+  RESP: CTAB, no respiratory distress  ABD: Soft, non-tender, non-distended, no peritoneal signs, no HSM, R CVA tenderness  EXT: Normal ROM x4. No edema. No calves tenderness  NEURO: Alert, oriented x3. CN2-12 intact, equal strength bilaterally, nl gait.  PSYCH: Cooperative, appropriate.      59-year-old female that presents with abdominal pain. labs, imaging, ua, pain management.

## 2024-10-20 NOTE — CONSULT NOTE ADULT - SUBJECTIVE AND OBJECTIVE BOX
GENERAL SURGERY CONSULT NOTE    Patient: SARAH BERGMAN , 59y (65)Female   MRN: [id number]  Location: [facility] ([place] ED  Visit: 10-20-24 Emergency  Date: 10-20-24 @ 15:51    HPI: 59 year old female with history of recent hiatal hernia repair and gastric bypass on 2024, hypertension, RAFA, migraines, renal colic, who presents tot he ED with complaints fo right sided flank/back pain intermittently for the past few days, acutely worsening this morning. She denies any fever, chills, nausea, vomiting, diarrhea/constipation, PO intolerance, or other complaints.    PAST MEDICAL & SURGICAL HISTORY:  Asthma  Depression  GERD (gastroesophageal reflux disease)  Neck pain  Back pain  HTN (hypertension)  RAFA (obstructive sleep apnea)  Pseudogout  Renal stones  Migraines  Severe obesity (BMI >= 40)  History of cholecystectomy  H/O bladder repair surgery  Renal stones  H/O removal of cyst    Home Medications:  amitriptyline 10 mg oral tablet: 1 tab(s) orally once a day (at bedtime) (2024 05:49)  buPROPion 150 mg/24 hours (XL) oral tablet, extended release: 1 tab(s) orally once a day (2024 05:49)  DULoxetine 60 mg oral delayed release capsule: 1 cap(s) orally 2 times a day (2024 05:49)  famotidine 40 mg oral tablet: 1 tab(s) orally once a day (2024 05:49)  loratadine 10 mg oral tablet: 1 tab(s) orally once a day (2024 05:49)  montelukast 10 mg oral tablet: 1 tab(s) orally once a day (at bedtime) (2024 05:49)  Mucus Relief  mg oral tablet, extended release: 600 milligram(s) orally 2 times a day (15 May 2024 08:31)  oxycodone-acetaminophen 7.5 mg-325 mg oral tablet: 1 tab(s) orally 3 times a day as needed for prn back/neck pain (2024 05:49)  pantoprazole 40 mg oral delayed release tablet: 1 tab(s) orally once a day (15 May 2024 08:39)  spironolactone 25 mg oral tablet: 2 tab(s) orally once a day (15 May 2024 08:42)  Ubrelvy 100 mg oral tablet: 1 tab(s) orally as needed for migraine (2024 05:49)    VITALS:  T(F): 97.6 (10-20-24 @ 10:33), Max: 97.6 (10-20-24 @ 10:33)  HR: 77 (10-20-24 @ 10:33) (77 - 77)  BP: 145/86 (10-20-24 @ 10:33) (145/86 - 145/86)  RR: 18 (10-20-24 @ 10:33) (18 - 18)  SpO2: 98% (10-20-24 @ 10:33) (98% - 98%)    PHYSICAL EXAM:  General: NAD, AAOx3, calm and cooperative  Cardiac: RRR S1, S2  Respiratory: CTAB, normal respiratory effort  Abdomen: Soft, non-distended, non-tender  Back: +R CVAT  Vascular: Pulses 2+ throughout, extremities well perfused  Skin: Warm/dry, normal color, no jaundice    MEDICATIONS  (STANDING):    MEDICATIONS  (PRN):    LAB/STUDIES:                        13.5   13.50 )-----------( 224      ( 20 Oct 2024 11:15 )             43.1     10    142  |  107  |  21[H]  ----------------------------<  94  4.2   |  22  |  0.9    Ca    9.3      20 Oct 2024 11:15    TPro  6.5  /  Alb  3.9  /  TBili  0.3  /  DBili  x   /  AST  18  /  ALT  18  /  AlkPhos  110  10-20    LIVER FUNCTIONS - ( 20 Oct 2024 11:15 )  Alb: 3.9 g/dL / Pro: 6.5 g/dL / ALK PHOS: 110 U/L / ALT: 18 U/L / AST: 18 U/L / GGT: x           Urinalysis Basic - ( 20 Oct 2024 14:14 )    Color: Yellow / Appearance: Clear / S.022 / pH: x  Gluc: x / Ketone: Negative mg/dL  / Bili: Negative / Urobili: 1.0 mg/dL   Blood: x / Protein: Trace mg/dL / Nitrite: Negative   Leuk Esterase: Negative / RBC: 16 /HPF / WBC 2 /HPF   Sq Epi: x / Non Sq Epi: 1 /HPF / Bacteria: Negative /HPF    Urinalysis with Rflx Culture (collected 20 Oct 2024 14:14)    IMAGING:  < from: CT Abdomen and Pelvis w/ Oral Cont and w/ IV Cont (10.20.24 @ 13:58) >  IMPRESSION:  1.  Mild right hydroureteronephrosis with delayed nephrogram and   perinephric edema secondary to a right mid ureteral calculus measuring 4   x 4 x 6 mm.  2.  Normal appendix.    < end of copied text >

## 2024-10-20 NOTE — ED ADULT TRIAGE NOTE - LOCATION:
Malnutrition Type:  Context: chronic illness, social/environmental circumstances  Level: severe    Related to (etiology):   Conditions associated with medical diagnoses: diverticulitis and liver disease    Signs and Symptoms (as evidenced by):   Weight loss  Decreased appetite/po intake  GI intolerance: nausea, vomiting, abdominal pain and constipation    Malnutrition Characteristic Summary:  Weight Loss (Malnutrition): greater than 10% in 6 months  Energy Intake (Malnutrition): less than or equal to 75% for greater than or equal to 1 month      Interventions (treatment strategy):  1. Recommend a Minced and Moist diet, per SLP recommendation 2. Recommend Boost Plus TID 3.Recommend Jose David BID 4. Daily weight 5. Meal set/assistance/encourangement TID    Nutrition Diagnosis Status:   New   Right arm;

## 2024-10-20 NOTE — ED PROVIDER NOTE - NSFOLLOWUPINSTRUCTIONS_ED_ALL_ED_FT
Our Emergency Department Referral Coordinators will be reaching out to you in the next 24-48 hours from 9:00am to 5:00pm with a follow up appointment. Please expect a phone call from the hospital in that time frame. If you do not receive a call or if you have any questions or concerns, you can reach them at   [phone]      Kidney Stones    WHAT YOU NEED TO KNOW:    Kidney stones form in the urinary system when the water and waste in your urine are out of balance. When this happens, certain types of waste crystals separate from the urine. The crystals build up and form kidney stones. You may have more than one kidney stone.  Kidney Stones     WHILE YOU ARE HERE:    Informed consent is a legal document that explains the tests, treatments, or procedures that you may need. Informed consent means you understand what will be done and can make decisions about what you want. You give your permission when you sign the consent form. You can have someone sign this form for you if you are not able to sign it. You have the right to understand your medical care in words you know. Before you sign the consent form, understand the risks and benefits of what will be done. Make sure all your questions are answered.    An IV is a small tube placed in your vein that is used to give you medicine or liquids.    Medicine:    Alpha blockers may be given to help your kidney stones pass.    Antinausea medicine calms your stomach and prevents vomiting.    NSAIDs or acetaminophen may be given to help decrease swelling and pain.    Opioid pain medicine may be given. Do not wait until the pain is severe before you ask for more medicine.    Medicines to balance your electrolytes may be needed.  Tests:    Urine tests may show if you have blood in your urine. They may also show high amounts of the substances that form kidney stones, such as uric acid.    Blood tests show how well your kidneys are working. They may also be used to check the levels of calcium or uric acid in your blood.    X-ray or ultrasound pictures may be taken of your kidneys, bladder, and ureters. You may be given contrast liquid before an x-ray to help these show up better in the pictures. You may need to have more than one x-ray. Tell the healthcare provider if you have ever had an allergic reaction to contrast liquid.  Treatment: You may need any of the following if your kidney stones are too large to pass:    Lithotripsy is a procedure that uses shock waves to break up the stones. Pieces of the stones can then pass in your urine.    Nephrolithotomy is surgery to remove the kidney stones.    Ureteroscopic kidney stone removal is a procedure to remove the kidney stones.  RISKS:    You may get kidney stones again, even after treatment. Kidney stones can cause an infection. Without treatment, kidney stones may cause a blockage in your kidney or ureters. This can block the flow of urine and lead to kidney damage or failure.    CARE AGREEMENT:    You have the right to help plan your care. Learn about your health condition and how it may be treated. Discuss treatment options with your healthcare providers to decide what care you want to receive. You always have the right to refuse treatment.

## 2024-10-20 NOTE — ED PROVIDER NOTE - PHYSICAL EXAMINATION
VITAL SIGNS: I have reviewed nursing notes and confirm.  CONSTITUTIONAL: Well-developed; well-nourished; in no acute distress.  SKIN: Skin exam is warm and dry, no acute rash.  HEAD: Normocephalic; atraumatic.  EYES: PERRL, EOM intact; conjunctiva and sclera clear.  ENT: airway clear. TMs clear.  NECK: Supple  CARD: S1, S2 normal; no murmurs, gallops, or rubs. Regular rate and rhythm.  RESP: No wheezes, rales or rhonchi.  ABD: Normal bowel sounds; soft; non-distended; RLQ tenderness, right flank tenderness  BACK: no overlying skin changes, no midline tenderness, no cva tenderness  EXT: Normal ROM.   NEURO: Alert, oriented.   PSYCH: Cooperative, appropriate.

## 2024-10-20 NOTE — ED PROVIDER NOTE - OBJECTIVE STATEMENT
59-year-old female with history of high blood pressure, status post bariatric surgery this may complicated by narrow JJ anastomosis and ARDS from severe aspiration requiring intubation who presents ED with complaints of right-sided flank pain.  Patient states that it woke her up this morning states that the pain radiates into her right lower quadrant abdomen.  Patient reports some nausea.  But denies any vomiting, fevers, or diarrhea.  Had a normal bowel movement earlier this morning.  No injuries falls or trauma.  No urinary symptoms.  No vaginal bleeding.

## 2024-10-20 NOTE — ED PROVIDER NOTE - PROGRESS NOTE DETAILS
CECE: Patient reevaluated.  Feels better.  Pain improved.  No longer tender on examination.  Discussed results of CT scan labs and urinary studies.  Pain likely from right-sided kidney stone.  Will DC home with Flomax prescription.  Advised patient to take Tylenol at home, avoid any NSAIDs secondary to the bariatric surgery.  Patient understands.  Also given return precautions including worsening pain, fevers, or inability tolerate p.o.  Patient understands the plan and agrees.  Will give follow-up with urology.

## 2024-10-20 NOTE — CONSULT NOTE ADULT - ASSESSMENT
ASSESSMENT:  59 year old female with history of recent hiatal hernia repair and gastric bypass on April 2024, hypertension, RAFA, migraines, renal colic, who presents tot he ED aith complaints fo right sided flank/back pain intermittently for the past few days, acutely worsening this morning. She denies any fever, chills, nausea, vomiting, diarrhea/constipation, PO intolerance, or other complaints.    Physical exam findings, imaging, and labs as documented above.     PLAN:  -No surgical intervention from a bariatric perspective   -F/U Urology for right sided hydronephrosis/renal calculus     Above plan discussed with Attending Surgeon Dr. Short, patient, patient family, and Primary team  10-20-24 @ 15:51    [phone]

## 2024-10-21 RX ORDER — TAMSULOSIN HCL 0.4 MG
1 CAPSULE ORAL
Qty: 14 | Refills: 0
Start: 2024-10-21 | End: 2024-11-03

## 2024-10-29 NOTE — CHART NOTE - NSCHARTNOTEFT_GEN_A_CORE
[facility] MRN [id number] [facility]  sent to private uro chat 10/21 doug / Petty left message 10/29 - VICENTE     Specialty: urology

## 2024-10-30 ENCOUNTER — APPOINTMENT (OUTPATIENT)
Dept: SURGERY | Facility: CLINIC | Age: 59
End: 2024-10-30

## 2024-12-10 DIAGNOSIS — E66.813 OBESITY, CLASS 3: ICD-10-CM

## 2024-12-10 NOTE — H&P PST ADULT - PATIENT'S GENDER IDENTITY
What Type Of Note Output Would You Prefer (Optional)?: Standard Output
Is This A New Presentation, Or A Follow-Up?: Skin Lesion
Female

## 2024-12-13 ENCOUNTER — APPOINTMENT (OUTPATIENT)
Dept: SURGERY | Facility: CLINIC | Age: 59
End: 2024-12-13
Payer: MEDICARE

## 2024-12-13 VITALS
HEIGHT: 64 IN | BODY MASS INDEX: 31.58 KG/M2 | OXYGEN SATURATION: 98 % | WEIGHT: 185 LBS | SYSTOLIC BLOOD PRESSURE: 136 MMHG | DIASTOLIC BLOOD PRESSURE: 94 MMHG | HEART RATE: 106 BPM

## 2024-12-13 DIAGNOSIS — E53.8 DEFICIENCY OF OTHER SPECIFIED B GROUP VITAMINS: ICD-10-CM

## 2024-12-13 PROCEDURE — 99215 OFFICE O/P EST HI 40 MIN: CPT

## 2024-12-13 RX ORDER — FOLIC ACID 1 MG/1
1 TABLET ORAL DAILY
Qty: 30 | Refills: 1 | Status: ACTIVE | COMMUNITY
Start: 2024-12-13 | End: 1900-01-01

## 2025-04-17 ENCOUNTER — NON-APPOINTMENT (OUTPATIENT)
Age: 60
End: 2025-04-17

## 2025-04-25 ENCOUNTER — APPOINTMENT (OUTPATIENT)
Dept: SURGERY | Facility: CLINIC | Age: 60
End: 2025-04-25
Payer: MEDICARE

## 2025-04-25 VITALS
TEMPERATURE: 97 F | WEIGHT: 172.4 LBS | HEIGHT: 64 IN | OXYGEN SATURATION: 96 % | HEART RATE: 89 BPM | SYSTOLIC BLOOD PRESSURE: 122 MMHG | BODY MASS INDEX: 29.43 KG/M2 | DIASTOLIC BLOOD PRESSURE: 84 MMHG

## 2025-04-25 PROCEDURE — 99214 OFFICE O/P EST MOD 30 MIN: CPT

## 2025-07-28 NOTE — DISCHARGE NOTE NURSING/CASE MANAGEMENT/SOCIAL WORK - NSTRANSFERBELONGINGSRESP_GEN_A_NUR
I called pt. She needs OV to FU on skin boil in groin.  She is on antibiotics.  Schedule Mon 8/4 @ 4:30 in office.   yes